# Patient Record
Sex: MALE | Race: WHITE | Employment: UNEMPLOYED | URBAN - METROPOLITAN AREA
[De-identification: names, ages, dates, MRNs, and addresses within clinical notes are randomized per-mention and may not be internally consistent; named-entity substitution may affect disease eponyms.]

---

## 2018-11-15 ASSESSMENT — ENCOUNTER SYMPTOMS
BLOOD IN STOOL: 0
COUGH: 0
CONSTIPATION: 0
RHINORRHEA: 0
ABDOMINAL PAIN: 0
DIARRHEA: 0
NAUSEA: 0
VOMITING: 0
SHORTNESS OF BREATH: 0
COLOR CHANGE: 0
CHEST TIGHTNESS: 0
TROUBLE SWALLOWING: 0
WHEEZING: 0
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
## 2018-11-16 ENCOUNTER — OFFICE VISIT (OUTPATIENT)
Dept: FAMILY MEDICINE CLINIC | Age: 28
End: 2018-11-16
Payer: COMMERCIAL

## 2018-11-16 VITALS
DIASTOLIC BLOOD PRESSURE: 68 MMHG | OXYGEN SATURATION: 98 % | SYSTOLIC BLOOD PRESSURE: 106 MMHG | WEIGHT: 174.2 LBS | HEIGHT: 72 IN | HEART RATE: 64 BPM | BODY MASS INDEX: 23.6 KG/M2

## 2018-11-16 DIAGNOSIS — Z86.19 HISTORY OF HEPATITIS C: ICD-10-CM

## 2018-11-16 DIAGNOSIS — F11.11 HISTORY OF HEROIN ABUSE (HCC): ICD-10-CM

## 2018-11-16 DIAGNOSIS — M54.41 CHRONIC RIGHT-SIDED LOW BACK PAIN WITH RIGHT-SIDED SCIATICA: ICD-10-CM

## 2018-11-16 DIAGNOSIS — R04.2 HEMOPTYSIS: ICD-10-CM

## 2018-11-16 DIAGNOSIS — Z76.89 ENCOUNTER TO ESTABLISH CARE: Primary | ICD-10-CM

## 2018-11-16 DIAGNOSIS — E53.8 COBALAMIN DEFICIENCY: ICD-10-CM

## 2018-11-16 DIAGNOSIS — F51.01 PRIMARY INSOMNIA: ICD-10-CM

## 2018-11-16 DIAGNOSIS — G89.29 CHRONIC RIGHT-SIDED LOW BACK PAIN WITH RIGHT-SIDED SCIATICA: ICD-10-CM

## 2018-11-16 DIAGNOSIS — F41.1 GENERALIZED ANXIETY DISORDER: ICD-10-CM

## 2018-11-16 DIAGNOSIS — Z00.00 HEALTHCARE MAINTENANCE: ICD-10-CM

## 2018-11-16 DIAGNOSIS — E55.9 VITAMIN D DEFICIENCY: ICD-10-CM

## 2018-11-16 DIAGNOSIS — L40.9 PSORIASIS: ICD-10-CM

## 2018-11-16 DIAGNOSIS — F33.2 SEVERE EPISODE OF RECURRENT MAJOR DEPRESSIVE DISORDER, WITHOUT PSYCHOTIC FEATURES (HCC): ICD-10-CM

## 2018-11-16 PROBLEM — D48.5 NEOPLASM OF UNCERTAIN BEHAVIOR OF SKIN: Status: RESOLVED | Noted: 2018-11-16 | Resolved: 2018-11-16

## 2018-11-16 PROBLEM — F32.A DEPRESSIVE DISORDER: Status: ACTIVE | Noted: 2018-11-16

## 2018-11-16 PROBLEM — D48.5 NEOPLASM OF UNCERTAIN BEHAVIOR OF SKIN: Status: ACTIVE | Noted: 2018-11-16

## 2018-11-16 PROBLEM — F32.A DEPRESSIVE DISORDER: Status: RESOLVED | Noted: 2018-11-16 | Resolved: 2018-11-16

## 2018-11-16 PROBLEM — F19.90 POLY-DRUG MISUSER: Status: ACTIVE | Noted: 2018-11-16

## 2018-11-16 PROBLEM — F19.11 HISTORY OF POLYDRUG ABUSE (HCC): Status: ACTIVE | Noted: 2018-11-16

## 2018-11-16 PROBLEM — G47.00 INSOMNIA: Status: ACTIVE | Noted: 2018-11-16

## 2018-11-16 LAB
A/G RATIO: 1.8 (ref 1.1–2.2)
ALBUMIN SERPL-MCNC: 4.8 G/DL (ref 3.4–5)
ALP BLD-CCNC: 40 U/L (ref 40–129)
ALT SERPL-CCNC: 21 U/L (ref 10–40)
ANION GAP SERPL CALCULATED.3IONS-SCNC: 14 MMOL/L (ref 3–16)
AST SERPL-CCNC: 21 U/L (ref 15–37)
BILIRUB SERPL-MCNC: 1.2 MG/DL (ref 0–1)
BUN BLDV-MCNC: 21 MG/DL (ref 7–20)
CALCIUM SERPL-MCNC: 9.9 MG/DL (ref 8.3–10.6)
CHLORIDE BLD-SCNC: 106 MMOL/L (ref 99–110)
CO2: 23 MMOL/L (ref 21–32)
CREAT SERPL-MCNC: 0.8 MG/DL (ref 0.9–1.3)
FOLATE: 6.81 NG/ML (ref 4.78–24.2)
GFR AFRICAN AMERICAN: >60
GFR NON-AFRICAN AMERICAN: >60
GLOBULIN: 2.6 G/DL
GLUCOSE BLD-MCNC: 92 MG/DL (ref 70–99)
POTASSIUM SERPL-SCNC: 4.4 MMOL/L (ref 3.5–5.1)
SODIUM BLD-SCNC: 143 MMOL/L (ref 136–145)
TOTAL PROTEIN: 7.4 G/DL (ref 6.4–8.2)
TSH REFLEX: 0.7 UIU/ML (ref 0.27–4.2)
VITAMIN B-12: 876 PG/ML (ref 211–911)
VITAMIN D 25-HYDROXY: 31 NG/ML

## 2018-11-16 PROCEDURE — G8427 DOCREV CUR MEDS BY ELIG CLIN: HCPCS | Performed by: FAMILY MEDICINE

## 2018-11-16 PROCEDURE — 99385 PREV VISIT NEW AGE 18-39: CPT | Performed by: FAMILY MEDICINE

## 2018-11-16 PROCEDURE — G8420 CALC BMI NORM PARAMETERS: HCPCS | Performed by: FAMILY MEDICINE

## 2018-11-16 PROCEDURE — 99203 OFFICE O/P NEW LOW 30 MIN: CPT | Performed by: FAMILY MEDICINE

## 2018-11-16 PROCEDURE — 1036F TOBACCO NON-USER: CPT | Performed by: FAMILY MEDICINE

## 2018-11-16 PROCEDURE — G8484 FLU IMMUNIZE NO ADMIN: HCPCS | Performed by: FAMILY MEDICINE

## 2018-11-16 RX ORDER — COVID-19 ANTIGEN TEST
220 KIT MISCELLANEOUS
COMMUNITY
End: 2019-02-06 | Stop reason: SDUPTHER

## 2018-11-16 RX ORDER — CLOBETASOL PROPIONATE 0.5 MG/ML
1 LOTION TOPICAL 2 TIMES DAILY
Qty: 118 ML | Refills: 2 | Status: SHIPPED | OUTPATIENT
Start: 2018-11-16 | End: 2019-09-05 | Stop reason: SDUPTHER

## 2018-11-16 RX ORDER — BUPROPION HYDROCHLORIDE 150 MG/1
150 TABLET ORAL EVERY MORNING
Qty: 14 TABLET | Refills: 0 | Status: SHIPPED | OUTPATIENT
Start: 2018-11-16 | End: 2019-02-06

## 2018-11-16 RX ORDER — BUPROPION HYDROCHLORIDE 300 MG/1
300 TABLET ORAL EVERY MORNING
Qty: 21 TABLET | Refills: 0 | Status: SHIPPED | OUTPATIENT
Start: 2018-11-16 | End: 2019-02-06

## 2018-11-16 ASSESSMENT — PATIENT HEALTH QUESTIONNAIRE - PHQ9
6. FEELING BAD ABOUT YOURSELF - OR THAT YOU ARE A FAILURE OR HAVE LET YOURSELF OR YOUR FAMILY DOWN: 2
7. TROUBLE CONCENTRATING ON THINGS, SUCH AS READING THE NEWSPAPER OR WATCHING TELEVISION: 2
3. TROUBLE FALLING OR STAYING ASLEEP: 3
4. FEELING TIRED OR HAVING LITTLE ENERGY: 3
2. FEELING DOWN, DEPRESSED OR HOPELESS: 3
9. THOUGHTS THAT YOU WOULD BE BETTER OFF DEAD, OR OF HURTING YOURSELF: 2
5. POOR APPETITE OR OVEREATING: 3
10. IF YOU CHECKED OFF ANY PROBLEMS, HOW DIFFICULT HAVE THESE PROBLEMS MADE IT FOR YOU TO DO YOUR WORK, TAKE CARE OF THINGS AT HOME, OR GET ALONG WITH OTHER PEOPLE: 3
SUM OF ALL RESPONSES TO PHQ QUESTIONS 1-9: 21
SUM OF ALL RESPONSES TO PHQ QUESTIONS 1-9: 21
8. MOVING OR SPEAKING SO SLOWLY THAT OTHER PEOPLE COULD HAVE NOTICED. OR THE OPPOSITE, BEING SO FIGETY OR RESTLESS THAT YOU HAVE BEEN MOVING AROUND A LOT MORE THAN USUAL: 0
SUM OF ALL RESPONSES TO PHQ9 QUESTIONS 1 & 2: 6
1. LITTLE INTEREST OR PLEASURE IN DOING THINGS: 3

## 2018-11-16 ASSESSMENT — ANXIETY QUESTIONNAIRES
GAD7 TOTAL SCORE: 21
4. TROUBLE RELAXING: 3-NEARLY EVERY DAY
5. BEING SO RESTLESS THAT IT IS HARD TO SIT STILL: 3-NEARLY EVERY DAY
3. WORRYING TOO MUCH ABOUT DIFFERENT THINGS: 3-NEARLY EVERY DAY
7. FEELING AFRAID AS IF SOMETHING AWFUL MIGHT HAPPEN: 3-NEARLY EVERY DAY
1. FEELING NERVOUS, ANXIOUS, OR ON EDGE: 3-NEARLY EVERY DAY
2. NOT BEING ABLE TO STOP OR CONTROL WORRYING: 3-NEARLY EVERY DAY
6. BECOMING EASILY ANNOYED OR IRRITABLE: 3-NEARLY EVERY DAY

## 2018-11-16 ASSESSMENT — ENCOUNTER SYMPTOMS: BACK PAIN: 1

## 2018-11-16 NOTE — PATIENT INSTRUCTIONS
Eat 5 - 6 servings of fruit per day. Locally grown fresh fruit has the most antioxidants. If they are not available, frozen fruit is the next best.     Eat more fresh vegetables, olive oil, and a handful of nuts (unsalted) every day. Make all your grains (breads, pasta, rice) 'whole grain' only to increase natural fiber in your diet     Fish has healthy omega-3 oils. Eating fish twice a week has been shown to improve health. If you take fish oil, take at least 1,000mg EPA + DHA a day (you must look at the food label on the back of the bottle and add up these omega-3s to know how much of this beneficial nutrient is contained in the product). There is more evidence that eating fish improves health more than taking fish oi supplements. Eating eggs has benefit if you eat the high omega-3 eggs. In these eggs, the yolks are good for you. At [employer], go to the [employer] food section and get the 660mg omega-3 eggs. These are really good for you. The chickens are fed fish, and the benefits of the fish are imparted into the egg yolk. If you have diabetes, you should probably avoid eggs. Current research shows that a pesco-vegetarian diet (eating a plant based diet with fish) is the best for improving longevity and reducing cardiovascular disease. Limit saturated fats, sugar and red meat. Avoid trans-fats and processed meat (e.g. Salami). Avoid fried foods, potato dishes and white (or enriched, processed) flour. Foods to avoid! Trans-fats - increases risk of heart disease, stroke, and development of diabetes     Processed meats (lunch meat, goetta, sausage, pepperoni, etc.. ) - increases risk of cancers     High fructose corn syrup (fructose, corn syrup) - increases risk of high blood pressure, obesity and diabetes     More information on healthful diets and recipes is available at www. choosemyplate.gov, or ww.mypyramid.gov     Enhance your foods with spices.  They are full of nutritional benefit and

## 2018-11-16 NOTE — LETTER
[address] [id number]  Phone: [phone]  Fax: [phone]    Delon Hastings MD        December 3, 2018    [address] [facility]      Dear Margie Orlando:    The office has made attempts to call you regarding lab results. Please contact the office for your information. If you have already received your results via [employer] or speaking with office staff please disregard. If you have any questions or concerns, please don't hesitate to call.     Sincerely,        Delon Hastings MD

## 2018-11-17 LAB
HIV AG/AB: NORMAL
HIV ANTIGEN: NORMAL
HIV-1 ANTIBODY: NORMAL
HIV-2 AB: NORMAL
REASON FOR REJECTION: NORMAL
REJECTED TEST: NORMAL

## 2018-11-19 ENCOUNTER — TELEPHONE (OUTPATIENT)
Dept: FAMILY MEDICINE CLINIC | Age: 28
End: 2018-11-19

## 2018-11-19 NOTE — TELEPHONE ENCOUNTER
We are aware and this was discussed with the patient at the time of his visit. We will repeat this lab at the next visit, which he is agreeable to, considering he had a syncopal episode during this lab draw. Thank you for the notification.

## 2018-11-20 LAB
HCV QNT BY NAAT IU/ML: NOT DETECTED IU/ML
HCV QNT BY NAAT LOG IU/ML: NOT DETECTED LOG IU/ML
INTERPRETATION: NOT DETECTED

## 2018-12-12 RX ORDER — VALACYCLOVIR HYDROCHLORIDE 1 G/1
2000 TABLET, FILM COATED ORAL DAILY
Qty: 30 TABLET | Refills: 0 | Status: SHIPPED | OUTPATIENT
Start: 2018-12-12 | End: 2019-04-11 | Stop reason: SDUPTHER

## 2018-12-14 ENCOUNTER — HOSPITAL ENCOUNTER (OUTPATIENT)
Dept: MRI IMAGING | Age: 28
Discharge: HOME | End: 2018-12-14
Payer: COMMERCIAL

## 2018-12-14 ENCOUNTER — OFFICE VISIT (OUTPATIENT)
Dept: FAMILY MEDICINE CLINIC | Age: 28
End: 2018-12-14
Payer: COMMERCIAL

## 2018-12-14 VITALS
OXYGEN SATURATION: 97 % | BODY MASS INDEX: 23.6 KG/M2 | HEART RATE: 68 BPM | SYSTOLIC BLOOD PRESSURE: 110 MMHG | WEIGHT: 174 LBS | DIASTOLIC BLOOD PRESSURE: 80 MMHG

## 2018-12-14 DIAGNOSIS — F33.2 SEVERE EPISODE OF RECURRENT MAJOR DEPRESSIVE DISORDER, WITHOUT PSYCHOTIC FEATURES (HCC): ICD-10-CM

## 2018-12-14 DIAGNOSIS — R44.9 SENSORY DEFICIT, RIGHT: ICD-10-CM

## 2018-12-14 DIAGNOSIS — M21.371 FOOT DROP, RIGHT: Primary | ICD-10-CM

## 2018-12-14 DIAGNOSIS — M54.41 ACUTE RIGHT-SIDED LOW BACK PAIN WITH RIGHT-SIDED SCIATICA: ICD-10-CM

## 2018-12-14 DIAGNOSIS — M21.371 FOOT DROP, RIGHT: ICD-10-CM

## 2018-12-14 PROCEDURE — 99214 OFFICE O/P EST MOD 30 MIN: CPT | Performed by: FAMILY MEDICINE

## 2018-12-14 PROCEDURE — 1036F TOBACCO NON-USER: CPT | Performed by: FAMILY MEDICINE

## 2018-12-14 PROCEDURE — G8427 DOCREV CUR MEDS BY ELIG CLIN: HCPCS | Performed by: FAMILY MEDICINE

## 2018-12-14 PROCEDURE — G8484 FLU IMMUNIZE NO ADMIN: HCPCS | Performed by: FAMILY MEDICINE

## 2018-12-14 PROCEDURE — 72148 MRI LUMBAR SPINE W/O DYE: CPT

## 2018-12-14 PROCEDURE — G8420 CALC BMI NORM PARAMETERS: HCPCS | Performed by: FAMILY MEDICINE

## 2018-12-14 RX ORDER — PREDNISONE 50 MG/1
50 TABLET ORAL DAILY
Qty: 5 TABLET | Refills: 0 | Status: SHIPPED | OUTPATIENT
Start: 2018-12-14 | End: 2018-12-19

## 2018-12-14 ASSESSMENT — ENCOUNTER SYMPTOMS
SHORTNESS OF BREATH: 0
ABDOMINAL PAIN: 0
BACK PAIN: 1
NAUSEA: 0
VOMITING: 0
COLOR CHANGE: 0

## 2018-12-16 PROBLEM — Z00.00 HEALTHCARE MAINTENANCE: Status: RESOLVED | Noted: 2018-11-16 | Resolved: 2018-12-16

## 2018-12-17 ENCOUNTER — TELEPHONE (OUTPATIENT)
Dept: FAMILY MEDICINE CLINIC | Age: 28
End: 2018-12-17

## 2018-12-17 DIAGNOSIS — M54.42 ACUTE BILATERAL LOW BACK PAIN WITH BILATERAL SCIATICA: Primary | ICD-10-CM

## 2018-12-17 DIAGNOSIS — M54.41 ACUTE BILATERAL LOW BACK PAIN WITH BILATERAL SCIATICA: Primary | ICD-10-CM

## 2018-12-18 ENCOUNTER — HOSPITAL ENCOUNTER (EMERGENCY)
Age: 28
Discharge: HOME | End: 2018-12-18
Payer: COMMERCIAL

## 2018-12-18 VITALS
BODY MASS INDEX: 23.57 KG/M2 | DIASTOLIC BLOOD PRESSURE: 87 MMHG | TEMPERATURE: 98.2 F | HEART RATE: 62 BPM | WEIGHT: 174 LBS | RESPIRATION RATE: 18 BRPM | HEIGHT: 72 IN | SYSTOLIC BLOOD PRESSURE: 139 MMHG | OXYGEN SATURATION: 100 %

## 2018-12-18 DIAGNOSIS — M62.838 SPASM OF MUSCLE: ICD-10-CM

## 2018-12-18 DIAGNOSIS — G89.29 ACUTE EXACERBATION OF CHRONIC LOW BACK PAIN: Primary | ICD-10-CM

## 2018-12-18 DIAGNOSIS — M54.50 ACUTE EXACERBATION OF CHRONIC LOW BACK PAIN: Primary | ICD-10-CM

## 2018-12-18 PROCEDURE — 96372 THER/PROPH/DIAG INJ SC/IM: CPT

## 2018-12-18 PROCEDURE — 99283 EMERGENCY DEPT VISIT LOW MDM: CPT

## 2018-12-18 PROCEDURE — 96374 THER/PROPH/DIAG INJ IV PUSH: CPT

## 2018-12-18 PROCEDURE — 6360000002 HC RX W HCPCS: Performed by: NURSE PRACTITIONER

## 2018-12-18 RX ORDER — ORPHENADRINE CITRATE 30 MG/ML
60 INJECTION INTRAMUSCULAR; INTRAVENOUS ONCE
Status: COMPLETED | OUTPATIENT
Start: 2018-12-18 | End: 2018-12-18

## 2018-12-18 RX ORDER — NAPROXEN 500 MG/1
500 TABLET ORAL 2 TIMES DAILY WITH MEALS
Qty: 30 TABLET | Refills: 0 | Status: SHIPPED | OUTPATIENT
Start: 2018-12-18 | End: 2019-04-02 | Stop reason: ALTCHOICE

## 2018-12-18 RX ORDER — DEXAMETHASONE SODIUM PHOSPHATE 4 MG/ML
10 INJECTION, SOLUTION INTRA-ARTICULAR; INTRALESIONAL; INTRAMUSCULAR; INTRAVENOUS; SOFT TISSUE ONCE
Status: COMPLETED | OUTPATIENT
Start: 2018-12-18 | End: 2018-12-18

## 2018-12-18 RX ORDER — METHOCARBAMOL 500 MG/1
500 TABLET, FILM COATED ORAL 3 TIMES DAILY
Qty: 21 TABLET | Refills: 0 | Status: SHIPPED | OUTPATIENT
Start: 2018-12-18 | End: 2018-12-25

## 2018-12-18 RX ADMIN — DEXAMETHASONE SODIUM PHOSPHATE 10 MG: 4 INJECTION, SOLUTION INTRAMUSCULAR; INTRAVENOUS at 03:02

## 2018-12-18 RX ADMIN — ORPHENADRINE CITRATE 60 MG: 60 INJECTION INTRAMUSCULAR; INTRAVENOUS at 03:03

## 2018-12-18 ASSESSMENT — PAIN DESCRIPTION - FREQUENCY: FREQUENCY: CONTINUOUS

## 2018-12-18 ASSESSMENT — PAIN DESCRIPTION - PAIN TYPE: TYPE: CHRONIC PAIN

## 2018-12-18 ASSESSMENT — PAIN DESCRIPTION - ONSET: ONSET: ON-GOING

## 2018-12-18 ASSESSMENT — PAIN DESCRIPTION - ORIENTATION: ORIENTATION: LOWER

## 2018-12-18 ASSESSMENT — ENCOUNTER SYMPTOMS
SHORTNESS OF BREATH: 0
VOMITING: 0
WHEEZING: 0
COLOR CHANGE: 0
NAUSEA: 0
BACK PAIN: 1
COUGH: 0
ABDOMINAL PAIN: 0
DIARRHEA: 0

## 2018-12-18 ASSESSMENT — PAIN DESCRIPTION - PROGRESSION: CLINICAL_PROGRESSION: NOT CHANGED

## 2018-12-18 ASSESSMENT — PAIN SCALES - GENERAL: PAINLEVEL_OUTOF10: 7

## 2018-12-18 ASSESSMENT — PAIN DESCRIPTION - DESCRIPTORS: DESCRIPTORS: ACHING

## 2018-12-18 ASSESSMENT — PAIN DESCRIPTION - LOCATION: LOCATION: BACK

## 2018-12-18 NOTE — ED PROVIDER NOTES
exhibits no discharge. Left eye exhibits no discharge. No scleral icterus. Neck: Normal range of motion. Neck supple. Cardiovascular: Normal rate and normal heart sounds. Pulmonary/Chest: Effort normal. No respiratory distress. Abdominal: Soft. Musculoskeletal: Normal range of motion. Neurological: He is alert and oriented to person, place, and time. GCS eye subscore is 4. GCS verbal subscore is 5. GCS motor subscore is 6. Patient has no central cervical, thoracic or lumbar spine tenderness or step-off, reproducible tenderness to the left paraspinal muscles however there is no rashes lesions or deformity. He is no numbness tingling or paresthesias, he is denying saddle anesthesia. Equal strength and power at 5\5 of his Arms and legs are resting in stretcher however he does have a positive right leg raises this illicits pain. Skin: Skin is warm. He is not diaphoretic. No pallor. Psychiatric: He has a normal mood and affect. His behavior is normal.   Nursing note and vitals reviewed. MEDICAL DECISION MAKING    Vitals:    Vitals:    12/18/18 0223   BP: (!) 147/87   Pulse: 65   Resp: 15   Temp: 98.2 °F (36.8 °C)   TempSrc: Oral   SpO2: 99%   Weight: 174 lb (78.9 kg)   Height: 6' (1.829 m)       LABS:Labs Reviewed - No data to display     Remainder of labs reviewed and werenegative at this time or not returned at the time of this note.     RADIOLOGY:   Non-plain film images such as CT, Ultrasound and MRI are read by the radiologist. Abida NAVARRO, NORM - CNP have directly visualized the radiologic plain film image(s) with the below findings:        Interpretation per the Radiologist below, if available at the time of thisnote:    No orders to display        Mri Lumbar Spine Wo Contrast    Result Date: 12/14/2018  EXAMINATION: MRI OF [address], 12/14/2018 12:31 pm TECHNIQUE: Multiplanar multisequence MRI of the lumbar spine was performed without the administration of

## 2018-12-18 NOTE — LETTER
[facility]  ED  [address] [address]  Phone: [phone]  Fax: [phone]               December 18, 2018    Patient: Joselyn Lee   YOB: 1990   Date of Visit: 12/18/2018       To Whom It May Concern:    Wilburt Cancer was seen and treated in our emergency department on 12/18/2018. He may return to work on 12/19/18.       Sincerely,             Signature:__________________________________

## 2018-12-19 NOTE — TELEPHONE ENCOUNTER
Referral in the system now. Referred to [facility]. Please call pt to inform him of the number to call to make an appmnt. A letter for the visit on 12/17 is fine. Please print and have pt .

## 2019-02-06 ENCOUNTER — OFFICE VISIT (OUTPATIENT)
Dept: FAMILY MEDICINE CLINIC | Age: 29
End: 2019-02-06
Payer: COMMERCIAL

## 2019-02-06 VITALS
SYSTOLIC BLOOD PRESSURE: 120 MMHG | HEART RATE: 66 BPM | OXYGEN SATURATION: 98 % | WEIGHT: 182 LBS | BODY MASS INDEX: 24.68 KG/M2 | TEMPERATURE: 98.4 F | DIASTOLIC BLOOD PRESSURE: 84 MMHG

## 2019-02-06 DIAGNOSIS — R11.2 INTRACTABLE VOMITING WITH NAUSEA, UNSPECIFIED VOMITING TYPE: ICD-10-CM

## 2019-02-06 DIAGNOSIS — H65.112 ACUTE MUCOID OTITIS MEDIA OF LEFT EAR: Primary | ICD-10-CM

## 2019-02-06 LAB — S PYO AG THROAT QL: NORMAL

## 2019-02-06 PROCEDURE — G8427 DOCREV CUR MEDS BY ELIG CLIN: HCPCS | Performed by: FAMILY MEDICINE

## 2019-02-06 PROCEDURE — G8420 CALC BMI NORM PARAMETERS: HCPCS | Performed by: FAMILY MEDICINE

## 2019-02-06 PROCEDURE — G8484 FLU IMMUNIZE NO ADMIN: HCPCS | Performed by: FAMILY MEDICINE

## 2019-02-06 PROCEDURE — 87880 STREP A ASSAY W/OPTIC: CPT | Performed by: FAMILY MEDICINE

## 2019-02-06 PROCEDURE — 99214 OFFICE O/P EST MOD 30 MIN: CPT | Performed by: FAMILY MEDICINE

## 2019-02-06 PROCEDURE — 1036F TOBACCO NON-USER: CPT | Performed by: FAMILY MEDICINE

## 2019-02-06 RX ORDER — AMOXICILLIN AND CLAVULANATE POTASSIUM 875; 125 MG/1; MG/1
1 TABLET, FILM COATED ORAL 2 TIMES DAILY
Qty: 20 TABLET | Refills: 0 | Status: SHIPPED | OUTPATIENT
Start: 2019-02-06 | End: 2019-02-16

## 2019-02-06 RX ORDER — ONDANSETRON 4 MG/1
4 TABLET, ORALLY DISINTEGRATING ORAL EVERY 8 HOURS PRN
Qty: 30 TABLET | Refills: 1 | Status: SHIPPED | OUTPATIENT
Start: 2019-02-06 | End: 2019-05-09 | Stop reason: ALTCHOICE

## 2019-02-06 ASSESSMENT — ENCOUNTER SYMPTOMS
CONSTIPATION: 0
RHINORRHEA: 0
COUGH: 0
TROUBLE SWALLOWING: 0
SHORTNESS OF BREATH: 0
SINUS PRESSURE: 0
COLOR CHANGE: 0
NAUSEA: 1
SORE THROAT: 0
VOMITING: 1
ABDOMINAL PAIN: 0
SINUS PAIN: 0
BACK PAIN: 1
DIARRHEA: 0
BLOOD IN STOOL: 0

## 2019-02-08 LAB — THROAT CULTURE: NORMAL

## 2019-04-01 ENCOUNTER — HOSPITAL ENCOUNTER (EMERGENCY)
Age: 29
Discharge: HOME | End: 2019-04-02
Attending: EMERGENCY MEDICINE
Payer: COMMERCIAL

## 2019-04-01 DIAGNOSIS — R10.84 GENERALIZED ABDOMINAL PAIN: ICD-10-CM

## 2019-04-01 DIAGNOSIS — K92.1 HEMATOCHEZIA: Primary | ICD-10-CM

## 2019-04-01 DIAGNOSIS — K21.9 GASTROESOPHAGEAL REFLUX DISEASE, ESOPHAGITIS PRESENCE NOT SPECIFIED: ICD-10-CM

## 2019-04-01 LAB
A/G RATIO: 1.5 (ref 1.1–2.2)
ALBUMIN SERPL-MCNC: 4.2 G/DL (ref 3.4–5)
ALP BLD-CCNC: 37 U/L (ref 40–129)
ALT SERPL-CCNC: 13 U/L (ref 10–40)
ANION GAP SERPL CALCULATED.3IONS-SCNC: 10 MMOL/L (ref 3–16)
AST SERPL-CCNC: 16 U/L (ref 15–37)
BASOPHILS ABSOLUTE: 0.1 K/UL (ref 0–0.2)
BASOPHILS RELATIVE PERCENT: 0.7 %
BILIRUB SERPL-MCNC: 0.4 MG/DL (ref 0–1)
BUN BLDV-MCNC: 22 MG/DL (ref 7–20)
CALCIUM SERPL-MCNC: 8.8 MG/DL (ref 8.3–10.6)
CHLORIDE BLD-SCNC: 101 MMOL/L (ref 99–110)
CO2: 28 MMOL/L (ref 21–32)
CREAT SERPL-MCNC: 0.8 MG/DL (ref 0.9–1.3)
EOSINOPHILS ABSOLUTE: 0.1 K/UL (ref 0–0.6)
EOSINOPHILS RELATIVE PERCENT: 1.7 %
GFR AFRICAN AMERICAN: >60
GFR NON-AFRICAN AMERICAN: >60
GLOBULIN: 2.8 G/DL
GLUCOSE BLD-MCNC: 87 MG/DL (ref 70–99)
HCT VFR BLD CALC: 45.1 % (ref 40.5–52.5)
HEMOGLOBIN: 15.6 G/DL (ref 13.5–17.5)
LIPASE: 45 U/L (ref 13–60)
LYMPHOCYTES ABSOLUTE: 1.4 K/UL (ref 1–5.1)
LYMPHOCYTES RELATIVE PERCENT: 18.4 %
MCH RBC QN AUTO: 30.7 PG (ref 26–34)
MCHC RBC AUTO-ENTMCNC: 34.5 G/DL (ref 31–36)
MCV RBC AUTO: 89 FL (ref 80–100)
MONOCYTES ABSOLUTE: 0.6 K/UL (ref 0–1.3)
MONOCYTES RELATIVE PERCENT: 7.6 %
NEUTROPHILS ABSOLUTE: 5.5 K/UL (ref 1.7–7.7)
NEUTROPHILS RELATIVE PERCENT: 71.6 %
PDW BLD-RTO: 13.4 % (ref 12.4–15.4)
PLATELET # BLD: 178 K/UL (ref 135–450)
PMV BLD AUTO: 9 FL (ref 5–10.5)
POTASSIUM SERPL-SCNC: 4.2 MMOL/L (ref 3.5–5.1)
RBC # BLD: 5.07 M/UL (ref 4.2–5.9)
SODIUM BLD-SCNC: 139 MMOL/L (ref 136–145)
TOTAL PROTEIN: 7 G/DL (ref 6.4–8.2)
WBC # BLD: 7.7 K/UL (ref 4–11)

## 2019-04-01 PROCEDURE — 85025 COMPLETE CBC W/AUTO DIFF WBC: CPT

## 2019-04-01 PROCEDURE — 80053 COMPREHEN METABOLIC PANEL: CPT

## 2019-04-01 PROCEDURE — 99283 EMERGENCY DEPT VISIT LOW MDM: CPT

## 2019-04-01 PROCEDURE — 83690 ASSAY OF LIPASE: CPT

## 2019-04-01 RX ORDER — PROMETHAZINE HYDROCHLORIDE 12.5 MG/1
12.5 TABLET ORAL EVERY 6 HOURS PRN
COMMUNITY
End: 2019-04-02 | Stop reason: ALTCHOICE

## 2019-04-01 ASSESSMENT — PAIN DESCRIPTION - FREQUENCY: FREQUENCY: INTERMITTENT

## 2019-04-01 ASSESSMENT — PAIN DESCRIPTION - ONSET: ONSET: ON-GOING

## 2019-04-01 ASSESSMENT — PAIN DESCRIPTION - LOCATION: LOCATION: ABDOMEN

## 2019-04-01 ASSESSMENT — PAIN SCALES - GENERAL: PAINLEVEL_OUTOF10: 7

## 2019-04-01 ASSESSMENT — PAIN DESCRIPTION - DESCRIPTORS: DESCRIPTORS: BURNING;SHARP

## 2019-04-01 ASSESSMENT — PAIN DESCRIPTION - ORIENTATION: ORIENTATION: RIGHT;MID

## 2019-04-01 ASSESSMENT — PAIN DESCRIPTION - PAIN TYPE: TYPE: ACUTE PAIN;VISCERAL PAIN

## 2019-04-02 ENCOUNTER — APPOINTMENT (OUTPATIENT)
Dept: CT IMAGING | Age: 29
End: 2019-04-02
Payer: COMMERCIAL

## 2019-04-02 VITALS
BODY MASS INDEX: 25.06 KG/M2 | TEMPERATURE: 99.1 F | SYSTOLIC BLOOD PRESSURE: 111 MMHG | HEART RATE: 85 BPM | DIASTOLIC BLOOD PRESSURE: 75 MMHG | HEIGHT: 72 IN | WEIGHT: 185 LBS | RESPIRATION RATE: 16 BRPM | OXYGEN SATURATION: 99 %

## 2019-04-02 VITALS
OXYGEN SATURATION: 100 % | WEIGHT: 185 LBS | RESPIRATION RATE: 16 BRPM | TEMPERATURE: 98.8 F | HEART RATE: 62 BPM | SYSTOLIC BLOOD PRESSURE: 127 MMHG | BODY MASS INDEX: 25.06 KG/M2 | HEIGHT: 72 IN | DIASTOLIC BLOOD PRESSURE: 80 MMHG

## 2019-04-02 DIAGNOSIS — R11.2 NAUSEA AND VOMITING, INTRACTABILITY OF VOMITING NOT SPECIFIED, UNSPECIFIED VOMITING TYPE: Primary | ICD-10-CM

## 2019-04-02 DIAGNOSIS — K62.5 RECTAL BLEEDING: ICD-10-CM

## 2019-04-02 DIAGNOSIS — R10.13 ABDOMINAL PAIN, EPIGASTRIC: ICD-10-CM

## 2019-04-02 LAB
A/G RATIO: 1.5 (ref 1.1–2.2)
ALBUMIN SERPL-MCNC: 4.1 G/DL (ref 3.4–5)
ALP BLD-CCNC: 40 U/L (ref 40–129)
ALT SERPL-CCNC: 14 U/L (ref 10–40)
AMPHETAMINE SCREEN, URINE: ABNORMAL
ANION GAP SERPL CALCULATED.3IONS-SCNC: 12 MMOL/L (ref 3–16)
AST SERPL-CCNC: 16 U/L (ref 15–37)
BARBITURATE SCREEN URINE: ABNORMAL
BASOPHILS ABSOLUTE: 0 K/UL (ref 0–0.2)
BASOPHILS RELATIVE PERCENT: 0.3 %
BENZODIAZEPINE SCREEN, URINE: ABNORMAL
BILIRUB SERPL-MCNC: 0.8 MG/DL (ref 0–1)
BILIRUBIN URINE: NEGATIVE
BILIRUBIN URINE: NEGATIVE
BLOOD, URINE: NEGATIVE
BLOOD, URINE: NEGATIVE
BUN BLDV-MCNC: 20 MG/DL (ref 7–20)
CALCIUM SERPL-MCNC: 8.6 MG/DL (ref 8.3–10.6)
CANNABINOID SCREEN URINE: POSITIVE
CHLORIDE BLD-SCNC: 105 MMOL/L (ref 99–110)
CLARITY: CLEAR
CLARITY: CLEAR
CO2: 24 MMOL/L (ref 21–32)
COCAINE METABOLITE SCREEN URINE: ABNORMAL
COLOR: YELLOW
COLOR: YELLOW
CREAT SERPL-MCNC: 0.9 MG/DL (ref 0.9–1.3)
EOSINOPHILS ABSOLUTE: 0.1 K/UL (ref 0–0.6)
EOSINOPHILS RELATIVE PERCENT: 2 %
GFR AFRICAN AMERICAN: >60
GFR NON-AFRICAN AMERICAN: >60
GLOBULIN: 2.8 G/DL
GLUCOSE BLD-MCNC: 98 MG/DL (ref 70–99)
GLUCOSE URINE: NEGATIVE MG/DL
GLUCOSE URINE: NEGATIVE MG/DL
HCT VFR BLD CALC: 44.8 % (ref 40.5–52.5)
HEMOGLOBIN: 15.7 G/DL (ref 13.5–17.5)
INR BLD: 1.08 (ref 0.86–1.14)
KETONES, URINE: NEGATIVE MG/DL
KETONES, URINE: NEGATIVE MG/DL
LEUKOCYTE ESTERASE, URINE: NEGATIVE
LEUKOCYTE ESTERASE, URINE: NEGATIVE
LYMPHOCYTES ABSOLUTE: 1 K/UL (ref 1–5.1)
LYMPHOCYTES RELATIVE PERCENT: 16.6 %
Lab: ABNORMAL
MCH RBC QN AUTO: 30.9 PG (ref 26–34)
MCHC RBC AUTO-ENTMCNC: 35 G/DL (ref 31–36)
MCV RBC AUTO: 88.2 FL (ref 80–100)
METHADONE SCREEN, URINE: ABNORMAL
MICROSCOPIC EXAMINATION: NORMAL
MICROSCOPIC EXAMINATION: NORMAL
MONOCYTES ABSOLUTE: 0.5 K/UL (ref 0–1.3)
MONOCYTES RELATIVE PERCENT: 7.6 %
NEUTROPHILS ABSOLUTE: 4.4 K/UL (ref 1.7–7.7)
NEUTROPHILS RELATIVE PERCENT: 73.5 %
NITRITE, URINE: NEGATIVE
NITRITE, URINE: NEGATIVE
OPIATE SCREEN URINE: ABNORMAL
OXYCODONE URINE: ABNORMAL
PDW BLD-RTO: 13.5 % (ref 12.4–15.4)
PH UA: 6
PH UA: 6 (ref 5–8)
PH UA: 6.5 (ref 5–8)
PHENCYCLIDINE SCREEN URINE: ABNORMAL
PLATELET # BLD: 158 K/UL (ref 135–450)
PMV BLD AUTO: 9 FL (ref 5–10.5)
POTASSIUM REFLEX MAGNESIUM: 3.9 MMOL/L (ref 3.5–5.1)
PROPOXYPHENE SCREEN: ABNORMAL
PROTEIN UA: NEGATIVE MG/DL
PROTEIN UA: NEGATIVE MG/DL
PROTHROMBIN TIME: 12.3 SEC (ref 9.8–13)
RBC # BLD: 5.08 M/UL (ref 4.2–5.9)
SODIUM BLD-SCNC: 141 MMOL/L (ref 136–145)
SPECIFIC GRAVITY UA: 1.02 (ref 1–1.03)
SPECIFIC GRAVITY UA: 1.02 (ref 1–1.03)
TOTAL PROTEIN: 6.9 G/DL (ref 6.4–8.2)
URINE REFLEX TO CULTURE: NORMAL
URINE REFLEX TO CULTURE: NORMAL
URINE TYPE: NORMAL
URINE TYPE: NORMAL
UROBILINOGEN, URINE: 0.2 E.U./DL
UROBILINOGEN, URINE: 0.2 E.U./DL
WBC # BLD: 5.9 K/UL (ref 4–11)

## 2019-04-02 PROCEDURE — 80053 COMPREHEN METABOLIC PANEL: CPT

## 2019-04-02 PROCEDURE — 74177 CT ABD & PELVIS W/CONTRAST: CPT

## 2019-04-02 PROCEDURE — 6360000004 HC RX CONTRAST MEDICATION: Performed by: EMERGENCY MEDICINE

## 2019-04-02 PROCEDURE — 96375 TX/PRO/DX INJ NEW DRUG ADDON: CPT

## 2019-04-02 PROCEDURE — 85610 PROTHROMBIN TIME: CPT

## 2019-04-02 PROCEDURE — 6360000002 HC RX W HCPCS: Performed by: EMERGENCY MEDICINE

## 2019-04-02 PROCEDURE — 96361 HYDRATE IV INFUSION ADD-ON: CPT

## 2019-04-02 PROCEDURE — 99284 EMERGENCY DEPT VISIT MOD MDM: CPT

## 2019-04-02 PROCEDURE — 85025 COMPLETE CBC W/AUTO DIFF WBC: CPT

## 2019-04-02 PROCEDURE — 81003 URINALYSIS AUTO W/O SCOPE: CPT

## 2019-04-02 PROCEDURE — 2580000003 HC RX 258: Performed by: EMERGENCY MEDICINE

## 2019-04-02 PROCEDURE — 96372 THER/PROPH/DIAG INJ SC/IM: CPT

## 2019-04-02 PROCEDURE — 96374 THER/PROPH/DIAG INJ IV PUSH: CPT

## 2019-04-02 PROCEDURE — 80307 DRUG TEST PRSMV CHEM ANLYZR: CPT

## 2019-04-02 RX ORDER — DICYCLOMINE HYDROCHLORIDE 10 MG/ML
20 INJECTION INTRAMUSCULAR ONCE
Status: COMPLETED | OUTPATIENT
Start: 2019-04-02 | End: 2019-04-02

## 2019-04-02 RX ORDER — PROMETHAZINE HYDROCHLORIDE 25 MG/1
25 TABLET ORAL EVERY 6 HOURS PRN
Qty: 20 TABLET | Refills: 0 | Status: SHIPPED | OUTPATIENT
Start: 2019-04-02 | End: 2019-04-09

## 2019-04-02 RX ORDER — PANTOPRAZOLE SODIUM 40 MG/1
40 TABLET, DELAYED RELEASE ORAL
Qty: 30 TABLET | Refills: 0 | Status: SHIPPED | OUTPATIENT
Start: 2019-04-02 | End: 2019-06-28 | Stop reason: ALTCHOICE

## 2019-04-02 RX ORDER — KETOROLAC TROMETHAMINE 30 MG/ML
15 INJECTION, SOLUTION INTRAMUSCULAR; INTRAVENOUS ONCE
Status: COMPLETED | OUTPATIENT
Start: 2019-04-02 | End: 2019-04-02

## 2019-04-02 RX ORDER — ONDANSETRON 2 MG/ML
4 INJECTION INTRAMUSCULAR; INTRAVENOUS
Status: DISCONTINUED | OUTPATIENT
Start: 2019-04-02 | End: 2019-04-02 | Stop reason: HOSPADM

## 2019-04-02 RX ORDER — DICYCLOMINE HYDROCHLORIDE 10 MG/1
10 CAPSULE ORAL
Qty: 28 CAPSULE | Refills: 0 | Status: SHIPPED | OUTPATIENT
Start: 2019-04-02 | End: 2019-05-09 | Stop reason: ALTCHOICE

## 2019-04-02 RX ORDER — 0.9 % SODIUM CHLORIDE 0.9 %
1000 INTRAVENOUS SOLUTION INTRAVENOUS ONCE
Status: COMPLETED | OUTPATIENT
Start: 2019-04-02 | End: 2019-04-02

## 2019-04-02 RX ORDER — FAMOTIDINE 20 MG/1
40 TABLET, FILM COATED ORAL DAILY
Qty: 60 TABLET | Refills: 0 | Status: SHIPPED | OUTPATIENT
Start: 2019-04-02 | End: 2019-05-09 | Stop reason: ALTCHOICE

## 2019-04-02 RX ORDER — HYDROCORTISONE ACETATE 25 MG/1
25 SUPPOSITORY RECTAL 2 TIMES DAILY
Qty: 30 SUPPOSITORY | Refills: 0 | Status: SHIPPED | OUTPATIENT
Start: 2019-04-02 | End: 2019-04-17

## 2019-04-02 RX ADMIN — KETOROLAC TROMETHAMINE 15 MG: 30 INJECTION, SOLUTION INTRAMUSCULAR; INTRAVENOUS at 14:22

## 2019-04-02 RX ADMIN — DICYCLOMINE HYDROCHLORIDE 20 MG: 20 INJECTION, SOLUTION INTRAMUSCULAR at 15:36

## 2019-04-02 RX ADMIN — SODIUM CHLORIDE 1000 ML: 9 INJECTION, SOLUTION INTRAVENOUS at 15:36

## 2019-04-02 RX ADMIN — IOPAMIDOL 75 ML: 755 INJECTION, SOLUTION INTRAVENOUS at 15:02

## 2019-04-02 RX ADMIN — ONDANSETRON 4 MG: 2 INJECTION INTRAMUSCULAR; INTRAVENOUS at 14:21

## 2019-04-02 ASSESSMENT — PAIN DESCRIPTION - PAIN TYPE: TYPE: ACUTE PAIN

## 2019-04-02 ASSESSMENT — PAIN SCALES - GENERAL
PAINLEVEL_OUTOF10: 7
PAINLEVEL_OUTOF10: 8

## 2019-04-02 ASSESSMENT — PAIN DESCRIPTION - LOCATION: LOCATION: ABDOMEN

## 2019-04-02 NOTE — ED PROVIDER NOTES
abused: Not on file     Physically abused: Not on file     Forced sexual activity: Not on file   Other Topics Concern    Not on file   Social History Narrative    Works as a  at [employer]    Has a 3year old girl    Good relationship with GF who is a        Nursing notes reviewed. ED Triage Vitals [04/02/19 1221]   Enc Vitals Group      /82      Pulse 67      Resp 17      Temp 98.7 °F (37.1 °C)      Temp Source Oral      SpO2 98 %      Weight 185 lb (83.9 kg)      Height 6' (1.829 m)      Head Circumference       Peak Flow       Pain Score       Pain Loc       Pain Edu? Excl. in [address]? GENERAL:  Awake, alert. Well developed, well nourished with no apparent distress. HENT:  Normocephalic, Atraumatic, moist mucous membranes. EYES:  Pupils equal round and reactive to light, Conjunctiva normal, extraocular movements normal.  NECK:  No meningeal signs, Supple. CHEST:  Regular rate and rhythm, chest wall non-tender. LUNGS:  Clear to auscultation bilaterally, no respiratory distress. ABDOMEN:  Soft, moderate midepigastric tenderness, no rebound, rigidity or guarding, non-distended, normal bowel sounds. No costovertebral angle tenderness to palpation. EXTREMITIES:  Normal range of motion, no edema, no tenderness, no deformity, distal pulses present. BACK:  No tenderness. SKIN: Warm, dry and intact. NEUROLOGIC: Normal mental status. Moving all extremities to command. RADIOLOGY  X-RAYS:  I have reviewed radiologic plain film image(s). ALL OTHER NON-PLAIN FILM IMAGES SUCH AS CT, ULTRASOUND AND MRI HAVE BEEN READ BY THE RADIOLOGIST. CT ABDOMEN PELVIS W IV CONTRAST Additional Contrast? None   Preliminary Result   No acute intra-abdominal or intrapelvic abnormality noted.       Appendix is visualized and appears normal.              LABS  Labs Reviewed   URINE DRUG SCREEN - Abnormal; Notable for the following components:       Result Value    Cannabinoid Scrn, Ur POSITIVE vomiting type    2. Abdominal pain, epigastric    3. Rectal bleeding        Blood pressure 127/80, pulse 62, temperature 98.8 °F (37.1 °C), temperature source Oral, resp. rate 16, height 6' (1.829 m), weight 185 lb (83.9 kg), SpO2 100 %. Patient was given scripts for the following medications. I counseled patient how to take these medications. Discharge Medication List as of 4/2/2019  4:25 PM      START taking these medications    Details   pantoprazole (PROTONIX) 40 MG tablet Take 1 tablet by mouth every morning (before breakfast), Disp-30 tablet, R-0Print      promethazine (PHENERGAN) 25 MG tablet Take 1 tablet by mouth every 6 hours as needed for Nausea, Disp-20 tablet, R-0Print      dicyclomine (BENTYL) 10 MG capsule Take 1 capsule by mouth 4 times daily (before meals and nightly) for 7 days, Disp-28 capsule, R-0Print      hydrocortisone (ANUSOL-HC) 25 MG suppository Place 1 suppository rectally 2 times daily for 15 days, Disp-30 suppository, R-0Print             Disposition  Pt is in good condition upon Discharge to home. This chart was generated using the [employer] dictation system. I created this record but it may contain dictation errors.           Berenice Parikh MD  04/02/19 1436

## 2019-04-02 NOTE — ED NOTES
Verbal and written discharge instructions given. IV removed. Prescriptions given to patient. Patient in stable condition, discharged home with significant other.      Sari Meyers RN  04/02/19 2605

## 2019-04-02 NOTE — ED NOTES
History of Present Illness     Patient Identification  Lorraine Marie is a 25 y.o. male. Patient information was obtained from patient. History/Exam limitations: none. Patient presented to the Emergency Department by private vehicle. Chief Complaint   Rectal Bleeding (rectal bleeding with bm x 5-6 months. c/o mid to right abd occasionally radiating to back intermittently x 5-6 months but worse x 5-6 days. was here last night; labs were taken but no scan. was given script for antacid. ) and Abdominal Pain    Mr. Ashanti Lehman is a 25year old male presenting for evaluation of abdominal pain and rectal bleeding. He has no significant PMHx. These symptoms have been present over the past few months, but have worsened over the last 4-5 days. He has not been able to eat x4 days and has not been able to hold fluids down for 2 days. He came to the ED yesterday and was sent home with antacids, he did not fill or take them. He returned due to the increased intensity of the pain. His pain is located in the RLQ w/ some radiation to his R lower back. Describes pain as burning and sharp and rates a 7-8/10. Pain initially was intermittent but has now become constant w/ no relief. He has tried ibuprofen and aleve for the pain w/ no improvement. Eating and drinking makes the pain worst.  Has associated nausea and vomiting, does also have intermittent constipation with diarrhea in between. Does also describe chronic rectal bleeding with all BMs. States that at some points the blood is enough to turn the toilet water red, but this does not occur w/ every BM. He does have associated LH and dizziness, with an episode of syncope yesterday that occurred after eating w/ emesis. Does also have LH and dizziness with standing. He denies any blood in the vomit. He did state he lost consciousness for 1-2 minutes, but did not hit his head.       ROS: +abdominal pain, +n/v/d, +constipation, +bright red blood in stool, +LH/dizziness, +syncope, +h/a, +weakness and fatigue, +dyspnea,  No fevers or chills, no night sweats, denies weight loss/gain,  denies urinary sxs, denies hematuria, dysuria, or urgency, denies easy bruising or bleeding       Past Medical History:   Diagnosis Date    Anxiety     Carpal tunnel syndrome     Depression     Headache     Pneumothorax      Family History   Problem Relation Age of Onset    Diabetes Father     Heart Attack Maternal Grandmother     Heart Disease Maternal Grandmother     Prostate Cancer Maternal Grandfather     Cancer Paternal Simmie Bright Prostate Cancer Paternal Grandfather    ([id number])  No current facility-administered medications for this encounter. Current Outpatient Medications   Medication Sig Dispense Refill    famotidine (PEPCID) 20 MG tablet Take 2 tablets by mouth daily 60 tablet 0    promethazine (PHENERGAN) 12.5 MG tablet Take 12.5 mg by mouth every 6 hours as needed for Nausea      ondansetron (ZOFRAN ODT) 4 MG disintegrating tablet Take 1 tablet by mouth every 8 hours as needed for Nausea or Vomiting 30 tablet 1    naproxen (NAPROSYN) 500 MG tablet Take 1 tablet by mouth 2 times daily (with meals) 30 tablet 0    valACYclovir (VALTREX) 1 g tablet Take 2 tablets by mouth daily 30 tablet 0    ibuprofen (IBU) 600 MG tablet Take 1 tablet by mouth every 6 hours as needed for Pain for 20 doses.  20 tablet 0     Allergies   Allergen Reactions    Cyclobenzaprine Other (See Comments)    Diclofenac Shortness Of Breath    Sulfamethoxazole-Trimethoprim Shortness Of Breath    Sulfa Antibiotics      Social History     Socioeconomic History    Marital status: Single     Spouse name: Not on file    Number of children: Not on file    Years of education: Not on file    Highest education level: Not on file   Occupational History    Not on file   Social Needs    Financial resource strain: Not on file    Food insecurity:     Worry: Not on file     Inability: Not on file   Aniceto Barba Transportation needs:     Medical: Not on file     Non-medical: Not on file   Tobacco Use    Smoking status: Former Smoker     Packs/day: 1.00     Years: 10.00     Pack years: 10.00     Types: Cigarettes     Last attempt to quit: 2013     Years since quittin.3    Smokeless tobacco: Never Used   Substance and Sexual Activity    Alcohol use: No     Comment: occasionally    Drug use: No     Comment: past history of heroin use    Sexual activity: Yes   Lifestyle    Physical activity:     Days per week: Not on file     Minutes per session: Not on file    Stress: Not on file   Relationships    Social connections:     Talks on phone: Not on file     Gets together: Not on file     Attends Baptism service: Not on file     Active member of club or organization: Not on file     Attends meetings of clubs or organizations: Not on file     Relationship status: Not on file    Intimate partner violence:     Fear of current or ex partner: Not on file     Emotionally abused: Not on file     Physically abused: Not on file     Forced sexual activity: Not on file   Other Topics Concern    Not on file   Social History Narrative    Works as a  at [employer]    Has a 3year old girl    Good relationship with GF who is a      Review of Systems  See HPI     Physical Exam     /78   Pulse 70   Temp 98.7 °F (37.1 °C) (Oral)   Resp 17   Ht 6' (1.829 m)   Wt 185 lb (83.9 kg)   SpO2 97%   BMI 25.09 kg/m²     Gen:  Pt is lying comfortably in bed. In no acute distress. Alert and oriented x 4. Speech is fluid. Capillary refill <2 sec. HEENT:  Head:  Normocephalic, atraumatic Eyes:  PERRLA, EOMI, no scleral icterus or conjunctival injection, Nose: nares patent, no d/c, Throat:  Oral mucosa moist and pink, dentition in good repair.    Neck:  Supple, trachea midline, no masses   Lungs:  Symmetrical chest expansion, no accessory muscle use, CTA bilaterally, no wheezes, rales or rhonchi   CV:  RRR, S1/S2 present, no S3/S4, no murmurs, rubs, or gallops, no JVD, radial, brachial, posterior tibial pulses 2+ and equal bilaterally   Abd:  Soft, non distended, diffusely mildly tender to palpation, BS present in all 4 quadrants, no rebound or guarding, no HSM  Neuro:  CN II-XII grossly intact, no focal deficits         ED Course     Studies: CT of Abd and Pelvis w/ Contrast 4/2/2019  Impression:  -No acute intra-abdominal or intrapelvic abnormality or intrapelvic abnormality noted   -appendix visualized and appears normal     Records Reviewed: reviewed 4/2/2019    Treatments: Zofran, Toradol, and Bentyl     Assessment and Plan  Assesment:   1. Abdominal Pain   2. Rectal Bleed   3.   Nausea/Vomiting     Plan:   -Pt has an appt to f/u with GI Dr. Maxi Pinto tomorrow  -use Phenergen for nausea, Protonix for possible ulcer, can use Bentyl as needed for abdominal pain   -Rectal bleeding most likely due to hemorrhoids will Rx Anusol for pain and bleeding  -will d/c with Rx for Protonix, Phenergan, Bentyl, and Anusol     [employer]   [facility] IV 4/2/2019     [employer]  04/02/19 2379

## 2019-04-05 ENCOUNTER — HOSPITAL ENCOUNTER (OUTPATIENT)
Dept: ULTRASOUND IMAGING | Age: 29
Discharge: HOME | End: 2019-04-05
Payer: COMMERCIAL

## 2019-04-05 DIAGNOSIS — R10.11 ABDOMINAL PAIN, ACUTE, RIGHT UPPER QUADRANT: ICD-10-CM

## 2019-04-05 PROCEDURE — 76705 ECHO EXAM OF ABDOMEN: CPT

## 2019-04-05 NOTE — ED PROVIDER NOTES
CHIEF COMPLAINT  Abdominal Pain (pain, small blood in stool for past couple of weeks, diarrhea and vomiting for the past day, c/o dizziness)      HISTORY OF PRESENT ILLNESS  Desiree Osuna is a 25 y.o. male who presents to the ED complaining of small amount of dark blood in stool past couple weeks. Pt does report hx GERD. Has not had egd or colonoscopy. He Says also sometimes is getting dizzy and lightheaded. .   No other complaints, modifying factors or associated symptoms. I have reviewed the following from the nursing documentation.     Past Medical History:   Diagnosis Date    Anxiety     Carpal tunnel syndrome     Depression     Headache     Pneumothorax      Past Surgical History:   Procedure Laterality Date    CHEST TUBE INSERTION       Family History   Problem Relation Age of Onset    Diabetes Father     Heart Attack Maternal Grandmother     Heart Disease Maternal Grandmother     Prostate Cancer Maternal Grandfather     Cancer Paternal Grandfather     Prostate Cancer Paternal Grandfather      Social History     Socioeconomic History    Marital status: Single     Spouse name: Not on file    Number of children: Not on file    Years of education: Not on file    Highest education level: Not on file   Occupational History    Not on file   Social Needs    Financial resource strain: Not on file    Food insecurity:     Worry: Not on file     Inability: Not on file    Transportation needs:     Medical: Not on file     Non-medical: Not on file   Tobacco Use    Smoking status: Former Smoker     Packs/day: 1.00     Years: 10.00     Pack years: 10.00     Types: Cigarettes     Last attempt to quit: 2013     Years since quittin.3    Smokeless tobacco: Never Used   Substance and Sexual Activity    Alcohol use: No     Comment: occasionally    Drug use: No     Comment: past history of heroin use    Sexual activity: Yes   Lifestyle    Physical activity:     Days per week: Not on file Minutes per session: Not on file    Stress: Not on file   Relationships    Social connections:     Talks on phone: Not on file     Gets together: Not on file     Attends Cheondoism service: Not on file     Active member of club or organization: Not on file     Attends meetings of clubs or organizations: Not on file     Relationship status: Not on file    Intimate partner violence:     Fear of current or ex partner: Not on file     Emotionally abused: Not on file     Physically abused: Not on file     Forced sexual activity: Not on file   Other Topics Concern    Not on file   Social History Narrative    Works as a  at [employer]    Has a 3year old girl    Good relationship with CARLYN who is a      No current facility-administered medications for this encounter. Current Outpatient Medications   Medication Sig Dispense Refill    famotidine (PEPCID) 20 MG tablet Take 2 tablets by mouth daily 60 tablet 0    ondansetron (ZOFRAN ODT) 4 MG disintegrating tablet Take 1 tablet by mouth every 8 hours as needed for Nausea or Vomiting 30 tablet 1    valACYclovir (VALTREX) 1 g tablet Take 2 tablets by mouth daily 30 tablet 0    pantoprazole (PROTONIX) 40 MG tablet Take 1 tablet by mouth every morning (before breakfast) 30 tablet 0    promethazine (PHENERGAN) 25 MG tablet Take 1 tablet by mouth every 6 hours as needed for Nausea 20 tablet 0    dicyclomine (BENTYL) 10 MG capsule Take 1 capsule by mouth 4 times daily (before meals and nightly) for 7 days 28 capsule 0    hydrocortisone (ANUSOL-HC) 25 MG suppository Place 1 suppository rectally 2 times daily for 15 days 30 suppository 0     Allergies   Allergen Reactions    Cyclobenzaprine Other (See Comments)    Diclofenac Shortness Of Breath    Sulfamethoxazole-Trimethoprim Shortness Of Breath    Sulfa Antibiotics        REVIEW OF SYSTEMS  10 systems reviewed, pertinent positives per HPI otherwise noted to be negative.     PHYSICAL EXAM  /75 Pulse 85   Temp 99.1 °F (37.3 °C) (Oral)   Resp 16   Ht 6' (1.829 m)   Wt 185 lb (83.9 kg)   SpO2 99%   BMI 25.09 kg/m²   GENERAL APPEARANCE: Awake and alert. Cooperative. No acute distress. HEAD: Normocephalic. Atraumatic. EYES: PERRL. EOM's grossly intact. ENT: Mucous membranes are moist.   NECK: Supple. HEART: RRR. LUNGS: Respirations unlabored. CTAB. Good air exchange. Speaking comfortably in full sentences. BACK: No midline spinal tenderness or step-off. ABDOMEN: Soft. Non-distended. Non-tender. No guarding or rebound. Normal bowel sounds. EXTREMITIES: No peripheral edema. Moves all extremities equally. All extremities neurovascularly intact. SKIN: Warm and dry. No acute rashes. NEUROLOGICAL: Alert and oriented. No gross facial drooping. Strength 5/5, sensation intact. Normal coordination. Gait normal.       LABS  I have reviewed all labs for this visit.    Results for orders placed or performed during the hospital encounter of 04/01/19   CBC Auto Differential   Result Value Ref Range    WBC 7.7 4.0 - 11.0 K/uL    RBC 5.07 4.20 - 5.90 M/uL    Hemoglobin 15.6 13.5 - 17.5 g/dL    Hematocrit 45.1 40.5 - 52.5 %    MCV 89.0 80.0 - 100.0 fL    MCH 30.7 26.0 - 34.0 pg    MCHC 34.5 31.0 - 36.0 g/dL    RDW 13.4 12.4 - 15.4 %    Platelets 179 880 - 845 K/uL    MPV 9.0 5.0 - 10.5 fL    Neutrophils % 71.6 %    Lymphocytes % 18.4 %    Monocytes % 7.6 %    Eosinophils % 1.7 %    Basophils % 0.7 %    Neutrophils # 5.5 1.7 - 7.7 K/uL    Lymphocytes # 1.4 1.0 - 5.1 K/uL    Monocytes # 0.6 0.0 - 1.3 K/uL    Eosinophils # 0.1 0.0 - 0.6 K/uL    Basophils # 0.1 0.0 - 0.2 K/uL   Lipase   Result Value Ref Range    Lipase 45.0 13.0 - 60.0 U/L   Comprehensive Metabolic Panel   Result Value Ref Range    Sodium 139 136 - 145 mmol/L    Potassium 4.2 3.5 - 5.1 mmol/L    Chloride 101 99 - 110 mmol/L    CO2 28 21 - 32 mmol/L    Anion Gap 10 3 - 16    Glucose 87 70 - 99 mg/dL    BUN 22 (H) 7 - 20 mg/dL    CREATININE 0.8 (L) 0.9 - 1.3 mg/dL    GFR Non-African American >60 >60    GFR African American >60 >60    Calcium 8.8 8.3 - 10.6 mg/dL    Total Protein 7.0 6.4 - 8.2 g/dL    Alb 4.2 3.4 - 5.0 g/dL    Albumin/Globulin Ratio 1.5 1.1 - 2.2    Total Bilirubin 0.4 0.0 - 1.0 mg/dL    Alkaline Phosphatase 37 (L) 40 - 129 U/L    ALT 13 10 - 40 U/L    AST 16 15 - 37 U/L    Globulin 2.8 g/dL   Urinalysis Reflex to Culture   Result Value Ref Range    Color, UA Yellow Straw/Yellow    Clarity, UA Clear Clear    Glucose, Ur Negative Negative mg/dL    Bilirubin Urine Negative Negative    Ketones, Urine Negative Negative mg/dL    Specific Gravity, UA 1.025 1.005 - 1.030    Blood, Urine Negative Negative    pH, UA 6.5 5.0 - 8.0    Protein, UA Negative Negative mg/dL    Urobilinogen, Urine 0.2 <2.0 E.U./dL    Nitrite, Urine Negative Negative    Leukocyte Esterase, Urine Negative Negative    Microscopic Examination Not Indicated     Urine Reflex to Culture Not Indicated     Urine Type Not Specified        ED COURSE/MDM  Patient seen and evaluated. Labs and imaging reviewed and results discussed with patient. Plan of care discussed with patient. Patient in agreement with plan. I told the patient they can come back to the ER at any time if the S/S persist or worsen or they have any other S/S of concern. I estimate there is LOW risk for CHOLECYSTITIS, ACUTE PANCREATITIS, MESENTERIC ISCHEMIA, AAA, APPENDICITIS, ABSCESS, BOWEL OBSTRUCTION, INCARCERATED HERNIA, PERFORATED BOWEL, COMPLICATED DIVERTICULITIS, ULCER and thus I consider the discharge disposition reasonable. Mary Jo and I have discussed the diagnosis and risks, and we agree with discharging home to follow-up with their primary doctor. We also discussed returning to the Emergency Department immediately if new or worsening symptoms occur.  We have discussed the symptoms which are most concerning (e.g., bloody sputum, fever, worsening pain or shortness of breath, vomiting, weakness) that necessitate immediate return. Discharge Medication List as of 4/2/2019  1:15 AM      START taking these medications    Details   famotidine (PEPCID) 20 MG tablet Take 2 tablets by mouth daily, Disp-60 tablet, R-0Print             CLINICAL IMPRESSION  1. Hematochezia    2. Generalized abdominal pain    3. Gastroesophageal reflux disease, esophagitis presence not specified        Blood pressure 111/75, pulse 85, temperature 99.1 °F (37.3 °C), temperature source Oral, resp. rate 16, height 6' (1.829 m), weight 185 lb (83.9 kg), SpO2 99 %. DISPOSITION  Regan Matthews was discharged to home in stable condition. This chart was generated in part by using Dragon Dictation system and may contain errors related to that system including errors in grammar, punctuation, and spelling, as well as words and phrases that may be inappropriate. When dictating, effort is made to correct spelling/grammar errors. If there are any questions or concerns please feel free to contact the dictating provider for clarification.      Barbara Covington DO  EMERGENCY [address] [place] [address],   04/05/19 [facility]

## 2019-04-11 RX ORDER — VALACYCLOVIR HYDROCHLORIDE 1 G/1
2000 TABLET, FILM COATED ORAL DAILY
Qty: 30 TABLET | Refills: 2 | Status: SHIPPED | OUTPATIENT
Start: 2019-04-11 | End: 2020-04-23 | Stop reason: SDUPTHER

## 2019-05-07 ENCOUNTER — OFFICE VISIT (OUTPATIENT)
Dept: FAMILY MEDICINE CLINIC | Age: 29
End: 2019-05-07
Payer: COMMERCIAL

## 2019-05-07 VITALS
TEMPERATURE: 98.1 F | HEART RATE: 62 BPM | BODY MASS INDEX: 24.68 KG/M2 | OXYGEN SATURATION: 99 % | WEIGHT: 182 LBS | SYSTOLIC BLOOD PRESSURE: 124 MMHG | DIASTOLIC BLOOD PRESSURE: 72 MMHG | RESPIRATION RATE: 18 BRPM

## 2019-05-07 DIAGNOSIS — M25.562 ACUTE PAIN OF LEFT KNEE: Primary | ICD-10-CM

## 2019-05-07 PROCEDURE — G8420 CALC BMI NORM PARAMETERS: HCPCS | Performed by: FAMILY MEDICINE

## 2019-05-07 PROCEDURE — 99213 OFFICE O/P EST LOW 20 MIN: CPT | Performed by: FAMILY MEDICINE

## 2019-05-07 PROCEDURE — 1036F TOBACCO NON-USER: CPT | Performed by: FAMILY MEDICINE

## 2019-05-07 PROCEDURE — G8427 DOCREV CUR MEDS BY ELIG CLIN: HCPCS | Performed by: FAMILY MEDICINE

## 2019-05-07 ASSESSMENT — ENCOUNTER SYMPTOMS: BACK PAIN: 1

## 2019-05-07 NOTE — PROGRESS NOTES
Thomas Charlton is a 25 y.o. male    Chief Complaint   Patient presents with    Knee Pain     left       HPI:    Knee Pain    The incident occurred 3 to 5 days ago. Incident location: hiking. Injury mechanism: fell on right knee but left knee hurts. The pain is present in the left knee (has chronic back pain). Pertinent negatives include no inability to bear weight. He reports no foreign bodies present. The symptoms are aggravated by movement and palpation. He has tried nothing for the symptoms. ROS:    Review of Systems   Musculoskeletal: Positive for back pain (chronic). /72   Pulse 62   Temp 98.1 °F (36.7 °C) (Oral)   Resp 18   Wt 182 lb (82.6 kg)   SpO2 99%   BMI 24.68 kg/m²     Physical Exam:    Physical Exam   Constitutional: He appears well-developed. No distress. Musculoskeletal:        Left knee: He exhibits bony tenderness. He exhibits normal meniscus. Tenderness found. Patellar tendon tenderness noted. No medial joint line and no lateral joint line tenderness noted. Skin: He is not diaphoretic. Psychiatric: He has a normal mood and affect. His behavior is normal.       Current Outpatient Medications   Medication Sig Dispense Refill    valACYclovir (VALTREX) 1 g tablet Take 2 tablets by mouth daily 30 tablet 2    famotidine (PEPCID) 20 MG tablet Take 2 tablets by mouth daily 60 tablet 0    pantoprazole (PROTONIX) 40 MG tablet Take 1 tablet by mouth every morning (before breakfast) 30 tablet 0    ondansetron (ZOFRAN ODT) 4 MG disintegrating tablet Take 1 tablet by mouth every 8 hours as needed for Nausea or Vomiting 30 tablet 1    dicyclomine (BENTYL) 10 MG capsule Take 1 capsule by mouth 4 times daily (before meals and nightly) for 7 days 28 capsule 0     No current facility-administered medications for this visit. Assessment:    1. Acute pain of left knee        Plan:    1. Acute pain of left knee  Likely patellar tendonitis.  Ice, try wearing a brace, use Tylenol prn pain and do exercises. - XR KNEE LEFT (1-2 VIEWS); Future      Patient to return to clinic if symptoms worsen or fail to improve.

## 2019-05-08 ENCOUNTER — TELEPHONE (OUTPATIENT)
Dept: FAMILY MEDICINE CLINIC | Age: 29
End: 2019-05-08

## 2019-05-09 ENCOUNTER — OFFICE VISIT (OUTPATIENT)
Dept: FAMILY MEDICINE CLINIC | Age: 29
End: 2019-05-09
Payer: COMMERCIAL

## 2019-05-09 VITALS
BODY MASS INDEX: 24.63 KG/M2 | DIASTOLIC BLOOD PRESSURE: 70 MMHG | WEIGHT: 181.6 LBS | SYSTOLIC BLOOD PRESSURE: 130 MMHG | OXYGEN SATURATION: 98 % | HEART RATE: 94 BPM

## 2019-05-09 DIAGNOSIS — R29.898 LEFT ARM WEAKNESS: ICD-10-CM

## 2019-05-09 DIAGNOSIS — F33.2 SEVERE EPISODE OF RECURRENT MAJOR DEPRESSIVE DISORDER, WITHOUT PSYCHOTIC FEATURES (HCC): ICD-10-CM

## 2019-05-09 DIAGNOSIS — K62.5 RECTAL BLEEDING: Primary | ICD-10-CM

## 2019-05-09 DIAGNOSIS — R10.84 GENERALIZED ABDOMINAL PAIN: ICD-10-CM

## 2019-05-09 DIAGNOSIS — R11.0 NAUSEA: ICD-10-CM

## 2019-05-09 PROCEDURE — 99215 OFFICE O/P EST HI 40 MIN: CPT | Performed by: FAMILY MEDICINE

## 2019-05-09 PROCEDURE — 1036F TOBACCO NON-USER: CPT | Performed by: FAMILY MEDICINE

## 2019-05-09 PROCEDURE — G8427 DOCREV CUR MEDS BY ELIG CLIN: HCPCS | Performed by: FAMILY MEDICINE

## 2019-05-09 PROCEDURE — G8420 CALC BMI NORM PARAMETERS: HCPCS | Performed by: FAMILY MEDICINE

## 2019-05-09 RX ORDER — FLUOXETINE HYDROCHLORIDE 20 MG/1
20 CAPSULE ORAL DAILY
Qty: 30 CAPSULE | Refills: 3 | Status: SHIPPED | OUTPATIENT
Start: 2019-05-09 | End: 2019-06-28

## 2019-05-09 RX ORDER — HYDROCORTISONE ACETATE 25 MG/1
25 SUPPOSITORY RECTAL EVERY 12 HOURS
Qty: 24 SUPPOSITORY | Refills: 1 | Status: SHIPPED | OUTPATIENT
Start: 2019-05-09 | End: 2019-06-28 | Stop reason: ALTCHOICE

## 2019-05-09 RX ORDER — METHYLPREDNISOLONE 4 MG/1
TABLET ORAL
Qty: 1 KIT | Refills: 0 | Status: SHIPPED | OUTPATIENT
Start: 2019-05-09 | End: 2019-05-15

## 2019-05-09 RX ORDER — ONDANSETRON 4 MG/1
4 TABLET, FILM COATED ORAL DAILY PRN
Qty: 30 TABLET | Refills: 0 | Status: SHIPPED | OUTPATIENT
Start: 2019-05-09 | End: 2019-06-28 | Stop reason: ALTCHOICE

## 2019-05-09 RX ORDER — TIZANIDINE 2 MG/1
2 TABLET ORAL NIGHTLY PRN
Qty: 10 TABLET | Refills: 0 | Status: SHIPPED | OUTPATIENT
Start: 2019-05-09 | End: 2019-06-28 | Stop reason: ALTCHOICE

## 2019-05-09 RX ORDER — FLUOXETINE 10 MG/1
10 CAPSULE ORAL DAILY
Qty: 7 CAPSULE | Refills: 0 | Status: SHIPPED | OUTPATIENT
Start: 2019-05-09 | End: 2019-06-28 | Stop reason: ALTCHOICE

## 2019-05-09 NOTE — LETTER
[address] [facility]  [address] Kaur [address] [facility] [address]  Phone: [phone]  Fax: [phone]    Sabrina Cummins MD        May 9, 2019     Patient: Lorraine Marie   YOB: 1990   Date of Visit: 5/9/2019       To Whom It May Concern: It is my medical opinion that Ashanti Lehman may return to work on 5/10/19. He was seen in my office today, 5/9/19    If you have any questions or concerns, please don't hesitate to call.     Sincerely,          Sabrina Cummins MD

## 2019-05-09 NOTE — PROGRESS NOTES
2019    This is a 25 y.o. male who presents for  Chief Complaint   Patient presents with    Rectal Bleeding     Has seen the GI doctor but they are not doing anything    Abdominal Pain    Back Pain    Shoulder Pain       HPI:     EGD and Colonoscopy 1 mo ago by [facility]  Horrible experience  \"Just internal hemorrhoids \"  Took a biopsy\" but lost it\"  Only gave him Prilosec     Bleeding has gotten worse   No rectal pain  Has rectal fullness    + RLQ pain  Constant  Had US for appendix and GB, everything was normal   ?blood tests     No appetite now  Weight is fluctuating    + n/v  ?H pylori   No hematemesis  Melena sometimes     + constipation  Every couple days, 2-3   Balls and large hard pieces  Sometimes mucous     protonix for 2 weeks, seeing no change    Left shoulder: can't lift anymore  Hx of labral correction in the past  Feels weak  No injury  Progressive  No n/t/w in arm/hand    Moods are not improved  Had to stop wellbutrin due to inc anxiety  Had SI on Zoloft in the past  No SI/HI  No recurrent drug use     Past Medical History:   Diagnosis Date    Anxiety     Carpal tunnel syndrome     Depression     Headache     Pneumothorax        Past Surgical History:   Procedure Laterality Date    CHEST TUBE INSERTION         Social History     Socioeconomic History    Marital status: Single     Spouse name: Not on file    Number of children: Not on file    Years of education: Not on file    Highest education level: Not on file   Occupational History    Not on file   Social Needs    Financial resource strain: Not on file    Food insecurity:     Worry: Not on file     Inability: Not on file    Transportation needs:     Medical: Not on file     Non-medical: Not on file   Tobacco Use    Smoking status: Former Smoker     Packs/day: 1.00     Years: 10.00     Pack years: 10.00     Types: Cigarettes     Last attempt to quit: 2013     Years since quittin.4    Smokeless tobacco: Never  valACYclovir (VALTREX) 1 g tablet Take 2 tablets by mouth daily 30 tablet 2    pantoprazole (PROTONIX) 40 MG tablet Take 1 tablet by mouth every morning (before breakfast) 30 tablet 0     No current facility-administered medications for this visit. Immunization History   Administered Date(s) Administered    Tdap (Boostrix, Adacel) 03/09/2017, 10/16/2018       Allergies   Allergen Reactions    Cyclobenzaprine Other (See Comments)    Diclofenac Shortness Of Breath    Sulfamethoxazole-Trimethoprim Shortness Of Breath    Sulfa Antibiotics        Review of Systems   Constitutional: Positive for activity change, appetite change and fatigue. Negative for chills, diaphoresis, fever and unexpected weight change. Respiratory: Negative for chest tightness and shortness of breath. Cardiovascular: Negative for chest pain, palpitations and leg swelling. Gastrointestinal: Positive for abdominal pain, anal bleeding, blood in stool, constipation, nausea and vomiting. Negative for abdominal distention, diarrhea and rectal pain. Genitourinary: Negative for difficulty urinating and dysuria. Musculoskeletal: Positive for arthralgias and myalgias. Negative for back pain, gait problem, joint swelling, neck pain and neck stiffness. Skin: Negative for color change, pallor, rash and wound. Neurological: Positive for weakness. Negative for dizziness, tremors, seizures, light-headedness, numbness and headaches. Psychiatric/Behavioral: Positive for decreased concentration, dysphoric mood and sleep disturbance. Negative for agitation, behavioral problems, confusion, hallucinations, self-injury and suicidal ideas. The patient is nervous/anxious. The patient is not hyperactive. /70 (Site: Right Upper Arm, Position: Sitting, Cuff Size: Medium Adult)   Pulse 94   Wt 181 lb 9.6 oz (82.4 kg)   SpO2 98%   BMI 24.63 kg/m²     Physical Exam   Constitutional: He is oriented to person, place, and time.  He appears well-developed and well-nourished. No distress. HENT:   Head: Normocephalic and atraumatic. Eyes: Pupils are equal, round, and reactive to light. Conjunctivae and EOM are normal.   Neck: Normal range of motion. Neck supple. Cardiovascular: Normal rate, regular rhythm, normal heart sounds and intact distal pulses. Exam reveals no gallop and no friction rub. No murmur heard. Pulmonary/Chest: Effort normal and breath sounds normal. No respiratory distress. He has no wheezes. He has no rales. Abdominal: Soft. Bowel sounds are normal. He exhibits no distension and no mass. There is tenderness. There is no rebound and no guarding. No hernia. Genitourinary: Rectal exam shows internal hemorrhoid and tenderness. Rectal exam shows no fissure, no mass, anal tone normal and guaiac negative stool. Musculoskeletal: He exhibits no edema, tenderness or deformity. Lymphadenopathy:     He has no cervical adenopathy. Neurological: He is alert and oriented to person, place, and time. No cranial nerve deficit or sensory deficit. He exhibits normal muscle tone. Coordination normal.   Skin: Skin is warm and dry. Capillary refill takes 2 to 3 seconds. No rash noted. He is not diaphoretic. No erythema. No pallor. Psychiatric: His speech is normal and behavior is normal. Judgment and thought content normal. He is not actively hallucinating. Cognition and memory are normal. He is attentive. Nursing note and vitals reviewed. Left Shoulder Exam     Tenderness   The patient is experiencing no tenderness.      Range of Motion   Active abduction: abnormal   Passive abduction: abnormal   Extension: abnormal   External rotation: normal   Forward flexion: normal   Internal rotation 90 degrees: normal     Muscle Strength   Abduction: 4/5   Internal rotation: 5/5   External rotation: 4/5   Supraspinatus: 4/5   Subscapularis: 4/5   Biceps: 5/5     Tests   Cross arm: positive  Drop arm: positive    Other

## 2019-05-09 NOTE — PATIENT INSTRUCTIONS
Patient Education        Hemorrhoids: Care Instructions  Your Care Instructions    Hemorrhoids are enlarged veins that develop in the anal canal. Bleeding during bowel movements, itching, swelling, and rectal pain are the most common symptoms. They can be uncomfortable at times, but hemorrhoids rarely are a serious problem. You can treat most hemorrhoids with simple changes to your diet and bowel habits. These changes include eating more fiber and not straining to pass stools. Most hemorrhoids do not need surgery or other treatment unless they are very large and painful or bleed a lot. Follow-up care is a key part of your treatment and safety. Be sure to make and go to all appointments, and call your doctor if you are having problems. It's also a good idea to know your test results and keep a list of the medicines you take. How can you care for yourself at home? · Sit in a few inches of warm water (sitz bath) 3 times a day and after bowel movements. The warm water helps with pain and itching. · Put ice on your anal area several times a day for 10 minutes at a time. Put a thin cloth between the ice and your skin. Follow this by placing a warm, wet towel on the area for another 10 to 20 minutes. · Take pain medicines exactly as directed. ? If the doctor gave you a prescription medicine for pain, take it as prescribed. ? If you are not taking a prescription pain medicine, ask your doctor if you can take an over-the-counter medicine. · Keep the anal area clean, but be gentle. Use water and a fragrance-free soap, such as [place], or use baby wipes or medicated pads, such as Tucks. · Wear cotton underwear and loose clothing to decrease moisture in the anal area. · Eat more fiber. Include foods such as whole-grain breads and cereals, raw vegetables, raw and dried fruits, and beans. · Drink plenty of fluids, enough so that your urine is light yellow or clear like water.  If you have kidney, heart, or liver disease and have to limit fluids, talk with your doctor before you increase the amount of fluids you drink. · Use a stool softener that contains bran or psyllium. You can save money by buying bran or psyllium (available in bulk at most health food stores) and sprinkling it on foods or stirring it into fruit juice. Or you can use a product such as Metamucil or Hydrocil. · Practice healthy bowel habits. ? Go to the bathroom as soon as you have the urge. ? Avoid straining to pass stools. Relax and give yourself time to let things happen naturally. ? Do not hold your breath while passing stools. ? Do not read while sitting on the toilet. Get off the toilet as soon as you have finished. · Take your medicines exactly as prescribed. Call your doctor if you think you are having a problem with your medicine. When should you call for help? Call 911 anytime you think you may need emergency care. For example, call if:    · You pass maroon or very bloody stools.    Call your doctor now or seek immediate medical care if:    · You have increased pain.     · You have increased bleeding.    Watch closely for changes in your health, and be sure to contact your doctor if:    · Your symptoms have not improved after 3 or 4 days. Where can you learn more? Go to https://[employer]. org and sign in to your [employer] account. Enter E322 in the [facility] box to learn more about \"Hemorrhoids: Care Instructions. \"     If you do not have an account, please click on the \"Sign Up Now\" link. Current as of: November 7, 2018  Content Version: 12.0  © 7359-6381 [employer]. Care instructions adapted under license by [facility] ([facility]). If you have questions about a medical condition or this instruction, always ask your healthcare professional. [address] any warranty or liability for your use of this information.

## 2019-05-10 ASSESSMENT — ENCOUNTER SYMPTOMS
RECTAL PAIN: 0
BACK PAIN: 0
SHORTNESS OF BREATH: 0
COLOR CHANGE: 0
DIARRHEA: 0
NAUSEA: 1
ABDOMINAL PAIN: 1
CONSTIPATION: 1
BLOOD IN STOOL: 1
ANAL BLEEDING: 1
CHEST TIGHTNESS: 0
VOMITING: 1
ABDOMINAL DISTENTION: 0

## 2019-05-16 ENCOUNTER — TELEPHONE (OUTPATIENT)
Dept: FAMILY MEDICINE CLINIC | Age: 29
End: 2019-05-16

## 2019-05-16 DIAGNOSIS — F31.9 BIPOLAR 1 DISORDER (HCC): ICD-10-CM

## 2019-05-16 DIAGNOSIS — Z78.9 PATIENT REQUESTS ALTERNATE TREATMENT: Primary | ICD-10-CM

## 2019-05-16 NOTE — TELEPHONE ENCOUNTER
Pt's wife called (not on HIPAA) said he was just diagnosed with bi-polar. Saw a counselor in [place] last night. Told him to stop taking the prozac because it will make the bi-polar worse.  Needs referral to a psychiatrist. Needs someone who will take medical mutual.

## 2019-05-17 NOTE — TELEPHONE ENCOUNTER
Referral to psychiatry placed per pt's SO's request.  Please call with information. As far as insurance coverage, she will need to call [facility] individually to see if they are covered under her plan. If they are not, the easiest route is to call insurance directly to see which psychiatrist is covered. Thanks.

## 2019-06-20 NOTE — TELEPHONE ENCOUNTER
Attached new HIPAA form to chart. Wife is now on it. Lvm for her to call. Note faxed with the HIPAA form from [facility] said she is wanting the number of the psychologist so she can get him scheduled. [phone] is the number on the referral. The provider listed is Yves Rossi. Per referral can schedule with him or first available.

## 2019-06-24 ENCOUNTER — TELEPHONE (OUTPATIENT)
Dept: FAMILY MEDICINE CLINIC | Age: 29
End: 2019-06-24

## 2019-06-25 NOTE — TELEPHONE ENCOUNTER
Spoke to wife and got pt scheduled with ottoniel. Pt wife states that pt condition is getting so bad he is unable to function. Got pt in asap.

## 2019-06-28 ENCOUNTER — OFFICE VISIT (OUTPATIENT)
Dept: FAMILY MEDICINE CLINIC | Age: 29
End: 2019-06-28
Payer: COMMERCIAL

## 2019-06-28 VITALS
OXYGEN SATURATION: 99 % | DIASTOLIC BLOOD PRESSURE: 88 MMHG | WEIGHT: 180 LBS | BODY MASS INDEX: 24.41 KG/M2 | SYSTOLIC BLOOD PRESSURE: 131 MMHG | HEART RATE: 56 BPM

## 2019-06-28 DIAGNOSIS — F41.0 PANIC ATTACK: ICD-10-CM

## 2019-06-28 DIAGNOSIS — F31.4 BIPOLAR 1 DISORDER, DEPRESSED, SEVERE (HCC): Primary | ICD-10-CM

## 2019-06-28 DIAGNOSIS — F41.1 GENERALIZED ANXIETY DISORDER: ICD-10-CM

## 2019-06-28 PROCEDURE — 99214 OFFICE O/P EST MOD 30 MIN: CPT | Performed by: FAMILY MEDICINE

## 2019-06-28 PROCEDURE — G8427 DOCREV CUR MEDS BY ELIG CLIN: HCPCS | Performed by: FAMILY MEDICINE

## 2019-06-28 PROCEDURE — 1036F TOBACCO NON-USER: CPT | Performed by: FAMILY MEDICINE

## 2019-06-28 PROCEDURE — G8420 CALC BMI NORM PARAMETERS: HCPCS | Performed by: FAMILY MEDICINE

## 2019-06-28 PROCEDURE — G0444 DEPRESSION SCREEN ANNUAL: HCPCS | Performed by: FAMILY MEDICINE

## 2019-06-28 RX ORDER — LAMOTRIGINE 25 MG/1
25 TABLET ORAL DAILY
Qty: 14 TABLET | Refills: 0 | Status: SHIPPED | OUTPATIENT
Start: 2019-06-28 | End: 2019-09-18 | Stop reason: SDUPTHER

## 2019-06-28 RX ORDER — LAMOTRIGINE 25 MG/1
50 TABLET ORAL DAILY
Qty: 28 TABLET | Refills: 0 | Status: SHIPPED | OUTPATIENT
Start: 2019-07-13 | End: 2019-09-27 | Stop reason: ALTCHOICE

## 2019-06-28 RX ORDER — PROMETHAZINE HYDROCHLORIDE 12.5 MG/1
12.5-25 TABLET ORAL NIGHTLY
Qty: 60 TABLET | Refills: 2 | Status: SHIPPED | OUTPATIENT
Start: 2019-06-28 | End: 2019-09-27 | Stop reason: ALTCHOICE

## 2019-06-28 ASSESSMENT — ENCOUNTER SYMPTOMS
BACK PAIN: 0
ABDOMINAL PAIN: 0
VOMITING: 0
COLOR CHANGE: 0
NAUSEA: 0
SHORTNESS OF BREATH: 0
CHEST TIGHTNESS: 0

## 2019-06-28 ASSESSMENT — ANXIETY QUESTIONNAIRES
GAD7 TOTAL SCORE: 20
7. FEELING AFRAID AS IF SOMETHING AWFUL MIGHT HAPPEN: 3-NEARLY EVERY DAY
4. TROUBLE RELAXING: 3-NEARLY EVERY DAY
1. FEELING NERVOUS, ANXIOUS, OR ON EDGE: 3-NEARLY EVERY DAY
5. BEING SO RESTLESS THAT IT IS HARD TO SIT STILL: 2-OVER HALF THE DAYS
6. BECOMING EASILY ANNOYED OR IRRITABLE: 3-NEARLY EVERY DAY
2. NOT BEING ABLE TO STOP OR CONTROL WORRYING: 3-NEARLY EVERY DAY
3. WORRYING TOO MUCH ABOUT DIFFERENT THINGS: 3-NEARLY EVERY DAY

## 2019-06-28 ASSESSMENT — PATIENT HEALTH QUESTIONNAIRE - PHQ9
9. THOUGHTS THAT YOU WOULD BE BETTER OFF DEAD, OR OF HURTING YOURSELF: 2
8. MOVING OR SPEAKING SO SLOWLY THAT OTHER PEOPLE COULD HAVE NOTICED. OR THE OPPOSITE, BEING SO FIGETY OR RESTLESS THAT YOU HAVE BEEN MOVING AROUND A LOT MORE THAN USUAL: 1
2. FEELING DOWN, DEPRESSED OR HOPELESS: 2
SUM OF ALL RESPONSES TO PHQ9 QUESTIONS 1 & 2: 5
7. TROUBLE CONCENTRATING ON THINGS, SUCH AS READING THE NEWSPAPER OR WATCHING TELEVISION: 2
SUM OF ALL RESPONSES TO PHQ QUESTIONS 1-9: 20
3. TROUBLE FALLING OR STAYING ASLEEP: 3
1. LITTLE INTEREST OR PLEASURE IN DOING THINGS: 3
4. FEELING TIRED OR HAVING LITTLE ENERGY: 2
5. POOR APPETITE OR OVEREATING: 2
6. FEELING BAD ABOUT YOURSELF - OR THAT YOU ARE A FAILURE OR HAVE LET YOURSELF OR YOUR FAMILY DOWN: 3
SUM OF ALL RESPONSES TO PHQ QUESTIONS 1-9: 20
10. IF YOU CHECKED OFF ANY PROBLEMS, HOW DIFFICULT HAVE THESE PROBLEMS MADE IT FOR YOU TO DO YOUR WORK, TAKE CARE OF THINGS AT HOME, OR GET ALONG WITH OTHER PEOPLE: 3

## 2019-06-28 NOTE — PATIENT INSTRUCTIONS
Patient Education        lamotrigine  Pronunciation:  la TESFAYE Buckner  Brand: LaMICtal, LaMICtal ODT, LaMICtal XR  What is the most important information I should know about lamotrigine? Lamotrigine may cause a severe or life-threatening skin rash, especially in children and in people who take a very high starting dose, or those who also take valproic acid (Depakene) or divalproex (Depakote). Seek emergency medical attention if you have a skin rash, hives, blistering, peeling, or sores in your mouth or around your eyes. Call your doctor at once if you have signs of other serious side effects, including: fever, swollen glands, severe muscle pain, bruising or unusual bleeding, yellowing of your skin or eyes, headache, neck stiffness, vomiting, confusion, or increased sensitivity to light. Some people have thoughts about suicide while taking lamotrigine. Stay alert to changes in your mood or symptoms. Report any new or worsening symptoms to your doctor. What is lamotrigine? Lamotrigine is an anti-epileptic medication, also called an anticonvulsant. Lamotrigine is used alone or with other medications to treat epileptic seizures in adults and children. Lamotrigine is also used to delay mood episodes in adults with bipolar disorder (manic depression). Immediate-release lamotrigine can be used in children as young as 3years old when it is given as part of a combination of seizure medications. However, this form should not be used as a single medication in a child or teenager who is younger than 12years old. Extended-release lamotrigine is for use only in adults and children who are at least 15years old. Lamotrigine may also be used for purposes not listed in this medication guide. What should I discuss with my healthcare provider before taking lamotrigine? You should not take lamotrigine if you are allergic to it.   Lamotrigine may cause a severe or life-threatening skin rash, especially in children and in

## 2019-07-01 NOTE — PROGRESS NOTES
Absolutely. The note mentioned that a psychologist dx with bipolar, but must have been a previous provider.
change. Respiratory: Negative for chest tightness and shortness of breath. Cardiovascular: Negative for chest pain and palpitations. Gastrointestinal: Negative for abdominal pain, nausea and vomiting. Musculoskeletal: Negative for arthralgias and back pain. Skin: Negative for color change. Neurological: Negative for dizziness, tremors, seizures, weakness, light-headedness, numbness and headaches. Psychiatric/Behavioral: Positive for agitation, decreased concentration, dysphoric mood, sleep disturbance and suicidal ideas. Negative for behavioral problems, confusion, hallucinations and self-injury. The patient is nervous/anxious and is hyperactive. /88 (Site: Left Upper Arm, Position: Sitting, Cuff Size: Medium Adult)   Pulse 56   Wt 180 lb (81.6 kg)   SpO2 99%   BMI 24.41 kg/m²     Physical Exam   Constitutional: He is oriented to person, place, and time. He appears well-developed and well-nourished. No distress. HENT:   Head: Normocephalic and atraumatic. Eyes: Pupils are equal, round, and reactive to light. Conjunctivae and EOM are normal.   Neck: Normal range of motion. Neck supple. Cardiovascular: Normal rate and regular rhythm. Pulmonary/Chest: Effort normal. No respiratory distress. Musculoskeletal: Normal range of motion. Neurological: He is alert and oriented to person, place, and time. Skin: Skin is warm and dry. Capillary refill takes 2 to 3 seconds. He is not diaphoretic. Psychiatric: He has a normal mood and affect. His speech is normal and behavior is normal. Judgment and thought content normal. He is not actively hallucinating. Cognition and memory are normal. He is attentive. Nursing note and vitals reviewed. Plan  1. Bipolar 1 disorder, depressed, severe ([facility] [address]  2. Panic attack  3. Generalized anxiety disorder    Discussed Dx of depression/bipolar/anxiety, etiology, and risks of avoidance of Dx.  Encouraged both medicinal support along with

## 2019-07-08 ENCOUNTER — TELEPHONE (OUTPATIENT)
Dept: FAMILY MEDICINE CLINIC | Age: 29
End: 2019-07-08

## 2019-07-11 ENCOUNTER — TELEPHONE (OUTPATIENT)
Dept: FAMILY MEDICINE CLINIC | Age: 29
End: 2019-07-11

## 2019-07-18 ENCOUNTER — HOSPITAL ENCOUNTER (EMERGENCY)
Age: 29
Discharge: HOME | End: 2019-07-18
Attending: EMERGENCY MEDICINE
Payer: COMMERCIAL

## 2019-07-18 ENCOUNTER — APPOINTMENT (OUTPATIENT)
Dept: CT IMAGING | Age: 29
End: 2019-07-18
Payer: COMMERCIAL

## 2019-07-18 VITALS
HEART RATE: 67 BPM | WEIGHT: 177.1 LBS | RESPIRATION RATE: 15 BRPM | TEMPERATURE: 98.5 F | DIASTOLIC BLOOD PRESSURE: 88 MMHG | SYSTOLIC BLOOD PRESSURE: 127 MMHG | HEIGHT: 72 IN | OXYGEN SATURATION: 97 % | BODY MASS INDEX: 23.99 KG/M2

## 2019-07-18 DIAGNOSIS — R11.2 NAUSEA VOMITING AND DIARRHEA: Primary | ICD-10-CM

## 2019-07-18 DIAGNOSIS — R19.7 NAUSEA VOMITING AND DIARRHEA: Primary | ICD-10-CM

## 2019-07-18 DIAGNOSIS — R10.84 GENERALIZED ABDOMINAL PAIN: ICD-10-CM

## 2019-07-18 DIAGNOSIS — E86.0 DEHYDRATION: ICD-10-CM

## 2019-07-18 LAB
A/G RATIO: 1.3 (ref 1.1–2.2)
ALBUMIN SERPL-MCNC: 4.2 G/DL (ref 3.4–5)
ALP BLD-CCNC: 59 U/L (ref 40–129)
ALT SERPL-CCNC: 18 U/L (ref 10–40)
AMORPHOUS: ABNORMAL /HPF
ANION GAP SERPL CALCULATED.3IONS-SCNC: 11 MMOL/L (ref 3–16)
AST SERPL-CCNC: 21 U/L (ref 15–37)
BACTERIA: ABNORMAL /HPF
BASOPHILS ABSOLUTE: 0.1 K/UL (ref 0–0.2)
BASOPHILS RELATIVE PERCENT: 1.9 %
BILIRUB SERPL-MCNC: 0.5 MG/DL (ref 0–1)
BILIRUBIN URINE: ABNORMAL
BLOOD, URINE: NEGATIVE
BUN BLDV-MCNC: 17 MG/DL (ref 7–20)
CALCIUM SERPL-MCNC: 9 MG/DL (ref 8.3–10.6)
CHLORIDE BLD-SCNC: 105 MMOL/L (ref 99–110)
CLARITY: CLEAR
CO2: 24 MMOL/L (ref 21–32)
COLOR: YELLOW
CREAT SERPL-MCNC: 0.9 MG/DL (ref 0.9–1.3)
EOSINOPHILS ABSOLUTE: 0.1 K/UL (ref 0–0.6)
EOSINOPHILS RELATIVE PERCENT: 1.4 %
EPITHELIAL CELLS, UA: ABNORMAL /HPF
GFR AFRICAN AMERICAN: >60
GFR NON-AFRICAN AMERICAN: >60
GLOBULIN: 3.3 G/DL
GLUCOSE BLD-MCNC: 97 MG/DL (ref 70–99)
GLUCOSE URINE: NEGATIVE MG/DL
HCT VFR BLD CALC: 47.1 % (ref 40.5–52.5)
HEMOGLOBIN: 15.9 G/DL (ref 13.5–17.5)
KETONES, URINE: NEGATIVE MG/DL
LACTIC ACID: 0.9 MMOL/L (ref 0.4–2)
LEUKOCYTE ESTERASE, URINE: NEGATIVE
LIPASE: 34 U/L (ref 13–60)
LYMPHOCYTES ABSOLUTE: 1.1 K/UL (ref 1–5.1)
LYMPHOCYTES RELATIVE PERCENT: 17.5 %
MCH RBC QN AUTO: 29.4 PG (ref 26–34)
MCHC RBC AUTO-ENTMCNC: 33.7 G/DL (ref 31–36)
MCV RBC AUTO: 87.4 FL (ref 80–100)
MICROSCOPIC EXAMINATION: YES
MONOCYTES ABSOLUTE: 0.5 K/UL (ref 0–1.3)
MONOCYTES RELATIVE PERCENT: 7.2 %
MUCUS: ABNORMAL /LPF
NEUTROPHILS ABSOLUTE: 4.7 K/UL (ref 1.7–7.7)
NEUTROPHILS RELATIVE PERCENT: 72 %
NITRITE, URINE: NEGATIVE
PDW BLD-RTO: 13.7 % (ref 12.4–15.4)
PH UA: 6 (ref 5–8)
PLATELET # BLD: 185 K/UL (ref 135–450)
PMV BLD AUTO: 9.1 FL (ref 5–10.5)
POTASSIUM REFLEX MAGNESIUM: 3.8 MMOL/L (ref 3.5–5.1)
PROTEIN UA: ABNORMAL MG/DL
RBC # BLD: 5.39 M/UL (ref 4.2–5.9)
RBC UA: ABNORMAL /HPF (ref 0–2)
SODIUM BLD-SCNC: 140 MMOL/L (ref 136–145)
SPECIFIC GRAVITY UA: >=1.03 (ref 1–1.03)
TOTAL PROTEIN: 7.5 G/DL (ref 6.4–8.2)
URINE TYPE: ABNORMAL
UROBILINOGEN, URINE: 0.2 E.U./DL
WBC # BLD: 6.5 K/UL (ref 4–11)
WBC UA: ABNORMAL /HPF (ref 0–5)

## 2019-07-18 PROCEDURE — 99284 EMERGENCY DEPT VISIT MOD MDM: CPT

## 2019-07-18 PROCEDURE — 36415 COLL VENOUS BLD VENIPUNCTURE: CPT

## 2019-07-18 PROCEDURE — 81001 URINALYSIS AUTO W/SCOPE: CPT

## 2019-07-18 PROCEDURE — 83605 ASSAY OF LACTIC ACID: CPT

## 2019-07-18 PROCEDURE — 6360000002 HC RX W HCPCS: Performed by: EMERGENCY MEDICINE

## 2019-07-18 PROCEDURE — 96374 THER/PROPH/DIAG INJ IV PUSH: CPT

## 2019-07-18 PROCEDURE — 2580000003 HC RX 258: Performed by: EMERGENCY MEDICINE

## 2019-07-18 PROCEDURE — 6360000004 HC RX CONTRAST MEDICATION: Performed by: EMERGENCY MEDICINE

## 2019-07-18 PROCEDURE — 80053 COMPREHEN METABOLIC PANEL: CPT

## 2019-07-18 PROCEDURE — 74177 CT ABD & PELVIS W/CONTRAST: CPT

## 2019-07-18 PROCEDURE — 83690 ASSAY OF LIPASE: CPT

## 2019-07-18 PROCEDURE — 6370000000 HC RX 637 (ALT 250 FOR IP): Performed by: EMERGENCY MEDICINE

## 2019-07-18 PROCEDURE — 96361 HYDRATE IV INFUSION ADD-ON: CPT

## 2019-07-18 PROCEDURE — 85025 COMPLETE CBC W/AUTO DIFF WBC: CPT

## 2019-07-18 RX ORDER — ONDANSETRON 4 MG/1
4 TABLET, ORALLY DISINTEGRATING ORAL EVERY 8 HOURS PRN
Qty: 15 TABLET | Refills: 0 | Status: SHIPPED | OUTPATIENT
Start: 2019-07-18 | End: 2019-09-27 | Stop reason: ALTCHOICE

## 2019-07-18 RX ORDER — DIPHENOXYLATE HYDROCHLORIDE AND ATROPINE SULFATE 2.5; .025 MG/1; MG/1
1 TABLET ORAL ONCE
Status: COMPLETED | OUTPATIENT
Start: 2019-07-18 | End: 2019-07-18

## 2019-07-18 RX ORDER — ONDANSETRON 2 MG/ML
4 INJECTION INTRAMUSCULAR; INTRAVENOUS ONCE
Status: COMPLETED | OUTPATIENT
Start: 2019-07-18 | End: 2019-07-18

## 2019-07-18 RX ORDER — 0.9 % SODIUM CHLORIDE 0.9 %
1000 INTRAVENOUS SOLUTION INTRAVENOUS ONCE
Status: COMPLETED | OUTPATIENT
Start: 2019-07-18 | End: 2019-07-18

## 2019-07-18 RX ADMIN — DIPHENOXYLATE HYDROCHLORIDE AND ATROPINE SULFATE 1 TABLET: 2.5; .025 TABLET ORAL at 18:41

## 2019-07-18 RX ADMIN — SODIUM CHLORIDE 1000 ML: 9 INJECTION, SOLUTION INTRAVENOUS at 18:42

## 2019-07-18 RX ADMIN — IOPAMIDOL 80 ML: 755 INJECTION, SOLUTION INTRAVENOUS at 19:30

## 2019-07-18 RX ADMIN — ONDANSETRON 4 MG: 2 INJECTION INTRAMUSCULAR; INTRAVENOUS at 18:41

## 2019-07-18 ASSESSMENT — PAIN DESCRIPTION - PAIN TYPE: TYPE: ACUTE PAIN

## 2019-07-18 ASSESSMENT — PAIN SCALES - GENERAL: PAINLEVEL_OUTOF10: 5

## 2019-07-18 ASSESSMENT — PAIN DESCRIPTION - DESCRIPTORS: DESCRIPTORS: STABBING

## 2019-07-18 ASSESSMENT — PAIN DESCRIPTION - LOCATION: LOCATION: CHEST;RIB CAGE

## 2019-07-18 ASSESSMENT — PAIN DESCRIPTION - FREQUENCY: FREQUENCY: INTERMITTENT

## 2019-07-18 NOTE — ED PROVIDER NOTES
Normal diameter, nonobstructed. Appendix without thickening or enlargement. LYMPH NODES: No abnormally enlarged nodes. PERITONEUM/RETROPERITONEUM: No ascites or free air. VESSELS: Aorta and IVC without significant abnormality. Splenic vein, SMV, PV and hepatic veins demonstrate enhancement. ABDOMINAL WALL: Normal. BONES: No significant abnormality. OTHER FINDINGS: None. 1. No acute intra-abdominopelvic abnormality. Patient with 4 days of fever, abdominal pain, vomiting and diarrhea. No localized tenderness on abdominal exam. No leukocytosis or left shift. No signs of significant dehydration other than urine concentration but no ketonuria. No significant electrolyte disturbance. CT abdomen without obvious colitis or enteritis. No obstruction, abscess, perforation, diverticulitis. Or appendicitis. Now tolerating PO fluids with no vomiting. Will have patient send stool sample in for analysis if diarrhea persists, he was not able to provide sample while in ED      I estimate there is LOW risk for ACUTE APPENDICITIS, BOWEL OBSTRUCTION, CHOLECYSTITIS, COMPLICATED DIVERTICULITIS, INCARCERATED HERNIA, PANCREATITIS,  PERFORATED BOWEL or ULCER, ISCHEMIC BOWEL, ABDOMINAL AORTIC ANEURYSM, AORTIC DISSECTION, thus I consider the discharge disposition reasonable. Also, there is no evidence or peritonitis, sepsis, or toxicity. Northwood Flatness and I have discussed the diagnosis and risks, and we agree with discharging home to follow-up with their primary doctor. We also discussed returning to the Emergency Department immediately if new or worsening symptoms occur.     IMPRESSION  Abdominal pain  Vomiting and diarrhea     Kristen Pino MD  07/19/19 5577

## 2019-07-18 NOTE — ED PROVIDER NOTES
nightly    VALACYCLOVIR (VALTREX) 1 G TABLET    Take 2 tablets by mouth daily   Modified Medications    No medications on file   Discontinued Medications    No medications on file        SURGICAL HISTORY:       Procedure Laterality Date    CHEST TUBE INSERTION          FAMILY HISTORY:       Problem Relation Age of Onset    Diabetes Father     Heart Attack Maternal Grandmother     Heart Disease Maternal Grandmother     Prostate Cancer Maternal Grandfather     Cancer Paternal Grandfather     Prostate Cancer Paternal Grandfather         SOCIAL HISTORY:    reports that he quit smoking about 5 years ago. His smoking use included cigarettes. He has a 10.00 pack-year smoking history. He has never used smokeless tobacco. He reports that he does not drink alcohol or use drugs. ALLERGIES:   Allergies   Allergen Reactions    Cyclobenzaprine Other (See Comments)    Diclofenac Shortness Of Breath    Sulfamethoxazole-Trimethoprim Shortness Of Breath    Sulfa Antibiotics        PHYSICAL EXAM:  VITAL SIGNS: /88   Pulse 67   Temp 98.5 °F (36.9 °C) (Oral)   Resp 15   Ht 6' (1.829 m)   Wt 80.3 kg (177 lb 1.6 oz)   SpO2 97%   BMI 24.02 kg/m²   Constitutional:  No acute distress, Non-toxic appearance  HENT: Normocephalic, Atraumatic Oropharynx moist, No oral exudates. TMs are normal.  Eyes:  PERRL, EOMI, Conjunctiva normal, No discharge. Neck: No tenderness, Supple, No lymphadenopathy, No stridor. Cardiovascular:  Normal heart rate, Normal rhythm, No murmurs, No rubs, No gallops. Pulmonary/Chest:  Normal breath sounds, No respiratory distress, No wheezing,  Abdomen:   Soft, bowel sounds are normal.  Soft with some mild diffuse tenderness.   No masses, No pulsatile masses  Back:  No tenderness, No CVA tenderness  Extremities:  Normal range of motion, Intact distal pulses, No edema, No tenderness  Neurologic:  Alert & oriented x 3, Speech is clear and appropriate, No upper extremity drift or lower extremity weakness,  Normal sensory function, No facial asymmetry, no truncal or extremity ataxia. Normal gait. Skin:  Warm, Dry, No erythema, No rash  Psychiatric:  Affect normal, Mood normal      EKG:    EKG interpreted by myself. Radiology:      LAB      ED COURSE & MEDICAL DECISION MAKING:  Pertinent Labs & Imaging studies reviewed. (See chart for details)  58-year-old male with 4-day history of nausea vomiting and diarrhea. He has had some myalgias and reported fever between 102 and 104. Currently afebrile. Denies UTI symptoms. He states he has a mild cough productive of clear sputum. No chest pain or shortness of breath. He is afebrile with normal vital signs. Recheck temperature was normal.  Lungs are clear. Bowel sounds are normal.  Mild diffuse abdominal tenderness to palpation. IV fluids were started. Patient was given Zofran IV and Lomotil by mouth. CBC, CMP and lipase were normal.   The patient states he feels a little better after Zofran and Lomotil. He has not had vomiting or diarrhea here. On repeat exam, the patient still has some diffuse abdominal tenderness especially in the left upper quadrant. Urinalysis is still pending. CT scan of the abdomen and pelvis with IV contrast was ordered and the study is pending. I have signed out [address]  Emergency Department care to my colleague, Dr. Kaylene Ching at 1577. We discussed the pertinent history, physical exam, completed/pending test results (if applicable) and current treatment plan. Please refer to his/her chart for the patient's remaining emergency department course and final disposition.       (Please note that portions of this note may have been completed with a voice recognition program.  Efforts were made to edit the dictation but occasionally words are mis-transcribed)      FINAL IMPRESSION:  1 --nausea vomiting and diarrhea  2 --generalized abdominal pain                Rk Mccartney MD  07/18/19 [id number] [place] [address]

## 2019-07-22 ENCOUNTER — TELEPHONE (OUTPATIENT)
Dept: FAMILY MEDICINE CLINIC | Age: 29
End: 2019-07-22

## 2019-09-03 DIAGNOSIS — Z00.00 HEALTHCARE MAINTENANCE: ICD-10-CM

## 2019-09-05 RX ORDER — CLOBETASOL PROPIONATE 0.5 MG/ML
1 LOTION TOPICAL 2 TIMES DAILY
Qty: 118 ML | Refills: 2 | Status: SHIPPED | OUTPATIENT
Start: 2019-09-05 | End: 2019-09-27 | Stop reason: ALTCHOICE

## 2019-09-10 ENCOUNTER — OFFICE VISIT (OUTPATIENT)
Dept: FAMILY MEDICINE CLINIC | Age: 29
End: 2019-09-10
Payer: COMMERCIAL

## 2019-09-10 VITALS
SYSTOLIC BLOOD PRESSURE: 122 MMHG | WEIGHT: 191.4 LBS | BODY MASS INDEX: 25.96 KG/M2 | DIASTOLIC BLOOD PRESSURE: 88 MMHG | HEART RATE: 58 BPM | RESPIRATION RATE: 16 BRPM | OXYGEN SATURATION: 96 %

## 2019-09-10 DIAGNOSIS — G89.29 CHRONIC RIGHT-SIDED LOW BACK PAIN WITH RIGHT-SIDED SCIATICA: Primary | ICD-10-CM

## 2019-09-10 DIAGNOSIS — M54.41 CHRONIC RIGHT-SIDED LOW BACK PAIN WITH RIGHT-SIDED SCIATICA: Primary | ICD-10-CM

## 2019-09-10 PROCEDURE — 1036F TOBACCO NON-USER: CPT | Performed by: NURSE PRACTITIONER

## 2019-09-10 PROCEDURE — G8427 DOCREV CUR MEDS BY ELIG CLIN: HCPCS | Performed by: NURSE PRACTITIONER

## 2019-09-10 PROCEDURE — 99213 OFFICE O/P EST LOW 20 MIN: CPT | Performed by: NURSE PRACTITIONER

## 2019-09-10 PROCEDURE — G8419 CALC BMI OUT NRM PARAM NOF/U: HCPCS | Performed by: NURSE PRACTITIONER

## 2019-09-10 ASSESSMENT — ENCOUNTER SYMPTOMS
RESPIRATORY NEGATIVE: 1
BACK PAIN: 1

## 2019-09-11 ENCOUNTER — TELEPHONE (OUTPATIENT)
Dept: FAMILY MEDICINE CLINIC | Age: 29
End: 2019-09-11

## 2019-09-12 ENCOUNTER — TELEPHONE (OUTPATIENT)
Dept: FAMILY MEDICINE CLINIC | Age: 29
End: 2019-09-12

## 2019-09-18 RX ORDER — LAMOTRIGINE 25 MG/1
25 TABLET ORAL DAILY
Qty: 14 TABLET | Refills: 0 | Status: SHIPPED | OUTPATIENT
Start: 2019-09-18 | End: 2019-09-20 | Stop reason: SDUPTHER

## 2019-09-20 ENCOUNTER — TELEPHONE (OUTPATIENT)
Dept: FAMILY MEDICINE CLINIC | Age: 29
End: 2019-09-20

## 2019-09-20 RX ORDER — LAMOTRIGINE 25 MG/1
25 TABLET ORAL DAILY
Qty: 14 TABLET | Refills: 0 | Status: SHIPPED | OUTPATIENT
Start: 2019-09-20 | End: 2019-09-27 | Stop reason: SDUPTHER

## 2019-09-27 ENCOUNTER — OFFICE VISIT (OUTPATIENT)
Dept: FAMILY MEDICINE CLINIC | Age: 29
End: 2019-09-27
Payer: COMMERCIAL

## 2019-09-27 VITALS
DIASTOLIC BLOOD PRESSURE: 82 MMHG | BODY MASS INDEX: 25.36 KG/M2 | WEIGHT: 187 LBS | SYSTOLIC BLOOD PRESSURE: 120 MMHG | HEART RATE: 60 BPM | OXYGEN SATURATION: 95 %

## 2019-09-27 DIAGNOSIS — M79.642 BILATERAL HAND PAIN: ICD-10-CM

## 2019-09-27 DIAGNOSIS — H60.313 ACUTE DIFFUSE OTITIS EXTERNA OF BOTH EARS: ICD-10-CM

## 2019-09-27 DIAGNOSIS — M25.50 ARTHRALGIA, UNSPECIFIED JOINT: ICD-10-CM

## 2019-09-27 DIAGNOSIS — F31.4 BIPOLAR 1 DISORDER, DEPRESSED, SEVERE (HCC): Primary | ICD-10-CM

## 2019-09-27 DIAGNOSIS — M79.641 BILATERAL HAND PAIN: ICD-10-CM

## 2019-09-27 DIAGNOSIS — R20.0 BILATERAL HAND NUMBNESS: ICD-10-CM

## 2019-09-27 PROCEDURE — G8427 DOCREV CUR MEDS BY ELIG CLIN: HCPCS | Performed by: FAMILY MEDICINE

## 2019-09-27 PROCEDURE — G0444 DEPRESSION SCREEN ANNUAL: HCPCS | Performed by: FAMILY MEDICINE

## 2019-09-27 PROCEDURE — G8419 CALC BMI OUT NRM PARAM NOF/U: HCPCS | Performed by: FAMILY MEDICINE

## 2019-09-27 PROCEDURE — 99214 OFFICE O/P EST MOD 30 MIN: CPT | Performed by: FAMILY MEDICINE

## 2019-09-27 PROCEDURE — 1036F TOBACCO NON-USER: CPT | Performed by: FAMILY MEDICINE

## 2019-09-27 PROCEDURE — 4130F TOPICAL PREP RX AOE: CPT | Performed by: FAMILY MEDICINE

## 2019-09-27 PROCEDURE — 96372 THER/PROPH/DIAG INJ SC/IM: CPT | Performed by: FAMILY MEDICINE

## 2019-09-27 RX ORDER — KETOROLAC TROMETHAMINE 30 MG/ML
30 INJECTION, SOLUTION INTRAMUSCULAR; INTRAVENOUS ONCE
Status: SHIPPED | OUTPATIENT
Start: 2019-09-27 | End: 2019-10-02

## 2019-09-27 RX ORDER — CIPROFLOXACIN AND DEXAMETHASONE 3; 1 MG/ML; MG/ML
4 SUSPENSION/ DROPS AURICULAR (OTIC) 2 TIMES DAILY
Qty: 1 BOTTLE | Refills: 0 | Status: SHIPPED | OUTPATIENT
Start: 2019-09-27 | End: 2019-10-04

## 2019-09-27 RX ORDER — LAMOTRIGINE 100 MG/1
100 TABLET ORAL DAILY
Qty: 14 TABLET | Refills: 0 | Status: SHIPPED | OUTPATIENT
Start: 2019-10-06 | End: 2019-10-16 | Stop reason: SDUPTHER

## 2019-09-27 RX ORDER — KETOROLAC TROMETHAMINE 30 MG/ML
30 INJECTION, SOLUTION INTRAMUSCULAR; INTRAVENOUS ONCE
Status: COMPLETED | OUTPATIENT
Start: 2019-09-27 | End: 2019-09-27

## 2019-09-27 RX ORDER — LAMOTRIGINE 25 MG/1
25 TABLET ORAL 2 TIMES DAILY
Qty: 16 TABLET | Refills: 0 | Status: SHIPPED | OUTPATIENT
Start: 2019-09-27 | End: 2019-10-17

## 2019-09-27 RX ADMIN — KETOROLAC TROMETHAMINE 30 MG: 30 INJECTION, SOLUTION INTRAMUSCULAR; INTRAVENOUS at 16:16

## 2019-09-27 ASSESSMENT — PATIENT HEALTH QUESTIONNAIRE - PHQ9
1. LITTLE INTEREST OR PLEASURE IN DOING THINGS: 3
8. MOVING OR SPEAKING SO SLOWLY THAT OTHER PEOPLE COULD HAVE NOTICED. OR THE OPPOSITE, BEING SO FIGETY OR RESTLESS THAT YOU HAVE BEEN MOVING AROUND A LOT MORE THAN USUAL: 3
7. TROUBLE CONCENTRATING ON THINGS, SUCH AS READING THE NEWSPAPER OR WATCHING TELEVISION: 3
5. POOR APPETITE OR OVEREATING: 3
SUM OF ALL RESPONSES TO PHQ QUESTIONS 1-9: 27
9. THOUGHTS THAT YOU WOULD BE BETTER OFF DEAD, OR OF HURTING YOURSELF: 3
4. FEELING TIRED OR HAVING LITTLE ENERGY: 3
2. FEELING DOWN, DEPRESSED OR HOPELESS: 3
6. FEELING BAD ABOUT YOURSELF - OR THAT YOU ARE A FAILURE OR HAVE LET YOURSELF OR YOUR FAMILY DOWN: 3
SUM OF ALL RESPONSES TO PHQ QUESTIONS 1-9: 27
SUM OF ALL RESPONSES TO PHQ9 QUESTIONS 1 & 2: 6
3. TROUBLE FALLING OR STAYING ASLEEP: 3

## 2019-09-27 ASSESSMENT — ANXIETY QUESTIONNAIRES
2. NOT BEING ABLE TO STOP OR CONTROL WORRYING: 3-NEARLY EVERY DAY
GAD7 TOTAL SCORE: 21
3. WORRYING TOO MUCH ABOUT DIFFERENT THINGS: 3-NEARLY EVERY DAY
4. TROUBLE RELAXING: 3-NEARLY EVERY DAY
1. FEELING NERVOUS, ANXIOUS, OR ON EDGE: 3-NEARLY EVERY DAY
5. BEING SO RESTLESS THAT IT IS HARD TO SIT STILL: 3-NEARLY EVERY DAY
6. BECOMING EASILY ANNOYED OR IRRITABLE: 3-NEARLY EVERY DAY
7. FEELING AFRAID AS IF SOMETHING AWFUL MIGHT HAPPEN: 3-NEARLY EVERY DAY

## 2019-09-27 ASSESSMENT — ENCOUNTER SYMPTOMS
BACK PAIN: 1
VOMITING: 0
NAUSEA: 1
SHORTNESS OF BREATH: 0
CHEST TIGHTNESS: 0
COLOR CHANGE: 1
ABDOMINAL PAIN: 0

## 2019-09-27 NOTE — PROGRESS NOTES
Comment: occasionally    Drug use: No     Comment: past history of heroin use    Sexual activity: Yes     Partners: Female   Lifestyle    Physical activity:     Days per week: Not on file     Minutes per session: Not on file    Stress: Not on file   Relationships    Social connections:     Talks on phone: Not on file     Gets together: Not on file     Attends [employer] service: Not on file     Active member of club or organization: Not on file     Attends meetings of clubs or organizations: Not on file     Relationship status: Not on file    Intimate partner violence:     Fear of current or ex partner: Not on file     Emotionally abused: Not on file     Physically abused: Not on file     Forced sexual activity: Not on file   Other Topics Concern    Not on file   Social History Narrative    Works as a  at [employer]    Has a 3year old girl    Good relationship with GF who is a        Family History   Problem Relation Age of Onset    Diabetes Father     Heart Attack Maternal Grandmother     Heart Disease Maternal Grandmother     Prostate Cancer Maternal Grandfather     Cancer Paternal Rk Spillers Prostate Cancer Paternal Grandfather        Current Outpatient Medications   Medication Sig Dispense Refill    ciprofloxacin-dexamethasone (CIPRODEX) 0.3-0.1 % otic suspension Place 4 drops into both ears 2 times daily for 7 days 1 Bottle 0    lamoTRIgine (LAMICTAL) 25 MG tablet Take 1 tablet by mouth 2 times daily for 8 days 16 tablet 0    [START ON 10/6/2019] lamoTRIgine (LAMICTAL) 100 MG tablet Take 1 tablet by mouth daily for 14 days 14 tablet 0    valACYclovir (VALTREX) 1 g tablet Take 2 tablets by mouth daily 30 tablet 2     Current Facility-Administered Medications   Medication Dose Route Frequency Provider Last Rate Last Dose    ketorolac (TORADOL) injection 30 mg  30 mg Intravenous Once Gloria Peña MD           Immunization History   Administered Date(s) Administered   Mili Joseph

## 2019-10-15 ENCOUNTER — TELEPHONE (OUTPATIENT)
Dept: FAMILY MEDICINE CLINIC | Age: 29
End: 2019-10-15

## 2019-10-16 DIAGNOSIS — F31.4 BIPOLAR 1 DISORDER, DEPRESSED, SEVERE (HCC): ICD-10-CM

## 2019-10-16 RX ORDER — LAMOTRIGINE 100 MG/1
100 TABLET ORAL DAILY
Qty: 14 TABLET | Refills: 0 | Status: SHIPPED | OUTPATIENT
Start: 2019-10-16 | End: 2020-01-30 | Stop reason: ALTCHOICE

## 2019-10-17 RX ORDER — LAMOTRIGINE 100 MG/1
100 TABLET ORAL 2 TIMES DAILY
Qty: 30 TABLET | Refills: 3 | Status: SHIPPED | OUTPATIENT
Start: 2019-10-17 | End: 2020-01-30 | Stop reason: DRUGHIGH

## 2019-10-23 ENCOUNTER — TELEPHONE (OUTPATIENT)
Dept: FAMILY MEDICINE CLINIC | Age: 29
End: 2019-10-23

## 2019-11-12 ENCOUNTER — TELEPHONE (OUTPATIENT)
Dept: FAMILY MEDICINE CLINIC | Age: 29
End: 2019-11-12

## 2019-11-12 DIAGNOSIS — L40.0 PLAQUE PSORIASIS: Primary | ICD-10-CM

## 2019-11-13 ENCOUNTER — TELEPHONE (OUTPATIENT)
Dept: FAMILY MEDICINE CLINIC | Age: 29
End: 2019-11-13

## 2019-11-13 RX ORDER — CLOBETASOL PROPIONATE 0.5 MG/ML
1 LOTION TOPICAL 2 TIMES DAILY
Qty: 118 ML | Refills: 5 | Status: SHIPPED | OUTPATIENT
Start: 2019-11-13 | End: 2020-02-24 | Stop reason: SDUPTHER

## 2019-11-13 RX ORDER — LAMOTRIGINE 25 MG/1
75 TABLET ORAL DAILY
Qty: 90 TABLET | Refills: 2 | Status: SHIPPED | OUTPATIENT
Start: 2019-11-13 | End: 2020-04-23

## 2019-12-06 ENCOUNTER — TELEPHONE (OUTPATIENT)
Dept: FAMILY MEDICINE CLINIC | Age: 29
End: 2019-12-06

## 2019-12-13 ENCOUNTER — TELEPHONE (OUTPATIENT)
Dept: FAMILY MEDICINE CLINIC | Age: 29
End: 2019-12-13

## 2019-12-23 ENCOUNTER — TELEPHONE (OUTPATIENT)
Dept: FAMILY MEDICINE CLINIC | Age: 29
End: 2019-12-23

## 2019-12-23 NOTE — TELEPHONE ENCOUNTER
I spoke to pt's wife, she will try to reach pt and talk to him about seeing another provider in the office this week.       Feelings/thoughts are more passive, patient himself and wife are VERY aware to call 911 if he is truly suicidal.

## 2019-12-23 NOTE — TELEPHONE ENCOUNTER
LM for pt to [facility]    Telephone Information:   Mobile [phone]

## 2019-12-23 NOTE — TELEPHONE ENCOUNTER
Spoke to pt's wife, Dion Pina, per BARRY. Pt at work and is working 3rd shift as well tonight. He has been telling her, \"I just feel hopeless, I feel suicidal.\" Dion Pina states he has an episode similar to this every year around this time of year but seems to be worse this year. She states he said, \"I am tired of feeling so depressed and hopeless. \" She states he does talk like this but does not have a plan and states he will not act on this. Dion Pina is aware if his ideations change, feels like he really is suicidal, or anything out of the ordinary- to take to ED or call 911 immediately. Dion Pina asking Dr. Martha Galeano for an appt to discuss medication- feel as the Lamictal is not help pt's depression. He currently takes 75mg QD. Dion Pina asking if Dr. Martha Galeano thinks he can see anyone else this Thursday or Friday or if she wants to see him when she returns to office 1/2 or 1/3. Dr. Martha Galeano, please advise. Dion Pina #[phone]    Also attempted to call Kevin Jesus to discuss from pt view- LM for him to CB.    Telephone Information:   Mobile [phone]

## 2019-12-26 NOTE — TELEPHONE ENCOUNTER
I LM for pt to call me back regarding Monday's notes. See below.     Telephone Information:   Mobile [phone]

## 2020-01-02 NOTE — TELEPHONE ENCOUNTER
Please schedule him with me (or anyone) ASAP. Can use a provider fill. In the meantime, he's likely not on a therapeutic dose of the lamictal yet. I would like for him to increase to 100 mg for one week. If still feeling like this, can increase to 200 mg daily for the week after. Let me know if a prescription is needed.      thanks

## 2020-01-02 NOTE — TELEPHONE ENCOUNTER
He feels that the bipolar is fine, but he his still having issue with the depression. He is doing well on the Lamictal 75 mg. I made him an appointment tomorrow.

## 2020-01-30 ENCOUNTER — OFFICE VISIT (OUTPATIENT)
Dept: FAMILY MEDICINE CLINIC | Age: 30
End: 2020-01-30
Payer: COMMERCIAL

## 2020-01-30 VITALS
BODY MASS INDEX: 27.53 KG/M2 | HEART RATE: 97 BPM | SYSTOLIC BLOOD PRESSURE: 138 MMHG | WEIGHT: 203 LBS | DIASTOLIC BLOOD PRESSURE: 82 MMHG | OXYGEN SATURATION: 97 %

## 2020-01-30 PROCEDURE — 4130F TOPICAL PREP RX AOE: CPT | Performed by: FAMILY MEDICINE

## 2020-01-30 PROCEDURE — 99214 OFFICE O/P EST MOD 30 MIN: CPT | Performed by: FAMILY MEDICINE

## 2020-01-30 PROCEDURE — 1036F TOBACCO NON-USER: CPT | Performed by: FAMILY MEDICINE

## 2020-01-30 PROCEDURE — G8484 FLU IMMUNIZE NO ADMIN: HCPCS | Performed by: FAMILY MEDICINE

## 2020-01-30 PROCEDURE — G8427 DOCREV CUR MEDS BY ELIG CLIN: HCPCS | Performed by: FAMILY MEDICINE

## 2020-01-30 PROCEDURE — G8419 CALC BMI OUT NRM PARAM NOF/U: HCPCS | Performed by: FAMILY MEDICINE

## 2020-01-30 RX ORDER — FLUTICASONE PROPIONATE 50 MCG
2 SPRAY, SUSPENSION (ML) NASAL DAILY
Qty: 1 BOTTLE | Refills: 5 | Status: SHIPPED | OUTPATIENT
Start: 2020-01-30 | End: 2020-04-23 | Stop reason: SDUPTHER

## 2020-01-30 RX ORDER — CLOTRIMAZOLE 1 G/ML
SOLUTION TOPICAL
Qty: 60 ML | Refills: 0 | Status: SHIPPED | OUTPATIENT
Start: 2020-01-30 | End: 2020-02-12 | Stop reason: SDUPTHER

## 2020-01-30 RX ORDER — NEOMYCIN SULFATE, POLYMYXIN B SULFATE AND HYDROCORTISONE 10; 3.5; 1 MG/ML; MG/ML; [USP'U]/ML
3 SUSPENSION/ DROPS AURICULAR (OTIC) 4 TIMES DAILY
Qty: 10 ML | Refills: 0 | Status: SHIPPED | OUTPATIENT
Start: 2020-01-30 | End: 2020-07-02

## 2020-01-30 RX ORDER — LAMOTRIGINE 25 MG/1
50 TABLET, CHEWABLE ORAL 2 TIMES DAILY
Qty: 120 TABLET | Refills: 0 | Status: SHIPPED | OUTPATIENT
Start: 2020-01-30 | End: 2021-04-30

## 2020-01-30 ASSESSMENT — ENCOUNTER SYMPTOMS
NAUSEA: 0
SHORTNESS OF BREATH: 0
BACK PAIN: 0
VOMITING: 0
COLOR CHANGE: 0
ABDOMINAL PAIN: 0
CHEST TIGHTNESS: 0

## 2020-01-30 NOTE — TELEPHONE ENCOUNTER
I called the pharmacy to find out if the clarification was for sig or for the qty and they stated it was not for the lamictal. That it Is for the Clotrimazole. Dr. Joshua Shafer spoke with the pharmacy to clarify.

## 2020-01-30 NOTE — TELEPHONE ENCOUNTER
Pharmacy needs clarification on rx.     Medication:    lamoTRIgine (LAMICTAL) 25 MG CHEW chew tab    Pharmacy:    [facility] [address], [address] [facility] [phone]  f [phone]

## 2020-01-30 NOTE — PATIENT INSTRUCTIONS
to help the drops get into the ear. · It's important to keep the liquid in the ear canal for 3 to 5 minutes. When should you call for help? Call your doctor now or seek immediate medical care if:    · You have a new or higher fever.     · You have new or worse pain, swelling, warmth, or redness around or behind your ear.     · You have new or increasing pus or blood draining from your ear.    Watch closely for changes in your health, and be sure to contact your doctor if:    · You are not getting better after 2 days (48 hours). Where can you learn more? Go to [email or web address]"[email or web address][email or web address]. org and sign in to your [employer] account. Enter C706 in the Spondo box to learn more about \"Swimmer's [facility]: Care Instructions. \"     If you do not have an account, please click on the \"Sign Up Now\" link. Current as of: July 28, 2019  Content Version: 12.3  © 4530-9158 [employer]wise, Incorporated. Care instructions adapted under license by [facility] ([facility]). If you have questions about a medical condition or this instruction, always ask your healthcare professional. [address] any warranty or liability for your use of this information.

## 2020-01-30 NOTE — PROGRESS NOTES
1/30/2020    This is a 30 y.o. male who presents for  Chief Complaint   Patient presents with    Depression    Manic Behavior       HPI:     Was doing well mood wise on the 100 mg daily but was having intense headaches and vomiting   Manic Episodes are less frequent, every 3-4 weeks, but more severe  Feels off the day before, dissociative   Next day: everything will piss him off  Cherelle = anger  hasn't slept for 2 days straight, will be fine after the deep depression   Was Taking 100 daily AM, then switched to PM to see if this helped, it did not    On third shift, goes back to first next week   Thought caffeine was doing it, so quit drinking that  Was eating with it to avoid AEs, didn't help    Going to NA again to \"get out of the house\"  His SO tends to nag and push buttons when it's unnecessary  She is going to counseling now  She is pushing him to try vraylar    Has had a horrible experience in the past with therapy  No current SI/HI now but can have during his deep bouts of depression    + b/l ear pain again  Has to wear headphones for work, or ear plugs  No drainage  Constant tinnitus when he feels they are infected again  No HL  + ear fullness when it happens     Past Medical History:   Diagnosis Date    Anxiety     Carpal tunnel syndrome     Depression     Headache     Pneumothorax        Past Surgical History:   Procedure Laterality Date    CHEST TUBE INSERTION         Social History     Socioeconomic History    Marital status: Single     Spouse name: Not on file    Number of children: Not on file    Years of education: Not on file    Highest education level: Not on file   Occupational History    Not on file   Social Needs    Financial resource strain: Not on file    Food insecurity:     Worry: Not on file     Inability: Not on file    Transportation needs:     Medical: Not on file     Non-medical: Not on file   Tobacco Use    Smoking status: Former Smoker     Packs/day: 1.00     Years: No current facility-administered medications for this visit. Immunization History   Administered Date(s) Administered    Tdap (Boostrix, Adacel) 03/09/2017, 10/16/2018       Allergies   Allergen Reactions    Cyclobenzaprine Other (See Comments)    Diclofenac Shortness Of Breath    Sulfamethoxazole-Trimethoprim Shortness Of Breath    Sulfa Antibiotics        Review of Systems   Constitutional: Negative for activity change, appetite change, fatigue and unexpected weight change. HENT: Positive for ear pain and tinnitus. Negative for ear discharge, hearing loss and postnasal drip. Respiratory: Negative for chest tightness and shortness of breath. Cardiovascular: Negative for chest pain and palpitations. Gastrointestinal: Negative for abdominal pain, nausea and vomiting. Musculoskeletal: Negative for arthralgias and back pain. Skin: Negative for color change. Neurological: Negative for dizziness, tremors, seizures, weakness, light-headedness, numbness and headaches. Psychiatric/Behavioral: Positive for agitation, decreased concentration, dysphoric mood and sleep disturbance. Negative for behavioral problems, confusion, hallucinations, self-injury and suicidal ideas. The patient is not nervous/anxious and is not hyperactive. /82 (Site: Left Upper Arm, Position: Sitting, Cuff Size: Medium Adult)   Pulse 97   Wt 203 lb (92.1 kg)   SpO2 97%   BMI 27.53 kg/m²     Physical Exam  Vitals signs and nursing note reviewed. Constitutional:       General: He is not in acute distress. Appearance: He is well-developed. He is not diaphoretic. HENT:      Head: Normocephalic and atraumatic. Right Ear: Tympanic membrane and external ear normal. There is no impacted cerumen. Left Ear: Tympanic membrane and external ear normal. There is no impacted cerumen.       Ears:      Comments: Diffuse moderate erythema of canals with hard pus-like material  Eyes:

## 2020-02-11 ENCOUNTER — TELEPHONE (OUTPATIENT)
Dept: FAMILY MEDICINE CLINIC | Age: 30
End: 2020-02-11

## 2020-02-12 RX ORDER — CLOTRIMAZOLE 1 G/ML
SOLUTION TOPICAL
Qty: 60 ML | Refills: 0 | Status: SHIPPED | OUTPATIENT
Start: 2020-02-12 | End: 2020-07-02

## 2020-02-12 NOTE — TELEPHONE ENCOUNTER
.  Last office visit 1/30/2020     Last written 1- 60ml with 0      Next office visit scheduled 2/14/2020    Requested Prescriptions     Pending Prescriptions Disp Refills    clotrimazole (LOTRIMIN) 1 % external solution 60 mL 0     Sig: Apply 3-4 drops 2 times daily for 10 days .

## 2020-02-14 ENCOUNTER — OFFICE VISIT (OUTPATIENT)
Dept: FAMILY MEDICINE CLINIC | Age: 30
End: 2020-02-14
Payer: COMMERCIAL

## 2020-02-14 VITALS
WEIGHT: 195 LBS | BODY MASS INDEX: 26.45 KG/M2 | HEART RATE: 96 BPM | DIASTOLIC BLOOD PRESSURE: 82 MMHG | SYSTOLIC BLOOD PRESSURE: 140 MMHG | OXYGEN SATURATION: 99 %

## 2020-02-14 PROCEDURE — G8419 CALC BMI OUT NRM PARAM NOF/U: HCPCS | Performed by: FAMILY MEDICINE

## 2020-02-14 PROCEDURE — G8484 FLU IMMUNIZE NO ADMIN: HCPCS | Performed by: FAMILY MEDICINE

## 2020-02-14 PROCEDURE — 1036F TOBACCO NON-USER: CPT | Performed by: FAMILY MEDICINE

## 2020-02-14 PROCEDURE — G8427 DOCREV CUR MEDS BY ELIG CLIN: HCPCS | Performed by: FAMILY MEDICINE

## 2020-02-14 PROCEDURE — 99214 OFFICE O/P EST MOD 30 MIN: CPT | Performed by: FAMILY MEDICINE

## 2020-02-14 ASSESSMENT — ENCOUNTER SYMPTOMS
ABDOMINAL PAIN: 0
VOMITING: 0
COUGH: 0
NAUSEA: 0
CHEST TIGHTNESS: 0
SHORTNESS OF BREATH: 0

## 2020-02-14 NOTE — LETTER
[address]  [facility] [address]  Phone: [phone]  Fax: [phone]    Felisa Torres MD        February 14, 2020      To whom it may concern,     Ralph Guaman has been a patient under my care since 11/15/2018. He has been diagnosed with Bipolar Disorder in the past prior to my care. Since I've known him, he has made valiant efforts to get his bipolar disorder under control. He has tried several medications under my care without success. Until recently with his current medication, his bipolar was not under control. He had been doing well on his current dosage of medication, and has plans to see our office Psychologist to continue to work on his improved mental health. During our visit today, we are performing a urine drug screen and testing in the urine for sexually transmitted diseases. At this stage, there would be no point in testing for Herpes infections in the blood work. There are two different types of this virus and the various types can correlate differently with various symptoms. At this time, he denies any symptoms to warrant testing, nor would it answer the question at hand.      Sincerely,        Felisa Torres MD

## 2020-02-14 NOTE — PROGRESS NOTES
2020    This is a 30 y.o. male who presents for  Chief Complaint   Patient presents with    Manic Behavior       HPI:     Feels MUCH better with the 100 mg total daily dosage  With the BID dosing, he does not have any of the headaches or nausea  No SI/HI  Moods feel more level    He is dealing with a complicated situation with his wife  He is trying to get an annulment   They have a daughter together  His wife was always accusing him of infidelity which triggered his paranoia   He found a tracking mandy on his phone that she placed  He returned home from work one night and found another man's jacket on the couch, apparently she was cheating on him  She has called his work and told his family he is back to using heroin, which is not true  At work, his boss approached him and made him go take a drug test  He is requesting another today    Due to her infidelity, he would like to be tested for STDs  He tries to avoid needles at all cost     Past Medical History:   Diagnosis Date    Anxiety     Carpal tunnel syndrome     Depression     Headache     Pneumothorax        Past Surgical History:   Procedure Laterality Date    CHEST TUBE INSERTION         Social History     Socioeconomic History    Marital status: Single     Spouse name: Not on file    Number of children: Not on file    Years of education: Not on file    Highest education level: Not on file   Occupational History    Not on file   Social Needs    Financial resource strain: Not on file    Food insecurity:     Worry: Not on file     Inability: Not on file    Transportation needs:     Medical: Not on file     Non-medical: Not on file   Tobacco Use    Smoking status: Former Smoker     Packs/day: 1.00     Years: 10.00     Pack years: 10.00     Types: Cigarettes     Last attempt to quit: 2013     Years since quittin.2    Smokeless tobacco: Never Used   Substance and Sexual Activity    Alcohol use: No     Comment: occasionally    Drug

## 2020-02-14 NOTE — Clinical Note
This patient is FINALLY coming to see you (I have been pushing this since 2018). I would like to meet and discuss his background to give you a heads up at some point. Thanks!

## 2020-02-17 LAB
C. TRACHOMATIS DNA ,URINE: NEGATIVE
N. GONORRHOEAE DNA, URINE: NEGATIVE

## 2020-02-18 LAB
REASON FOR REJECTION: NORMAL
REJECTED TEST: 5506

## 2020-02-19 ENCOUNTER — TELEPHONE (OUTPATIENT)
Dept: FAMILY MEDICINE CLINIC | Age: 30
End: 2020-02-19

## 2020-02-19 LAB
6-ACETYLMORPHINE: NOT DETECTED
7-AMINOCLONAZEPAM: NOT DETECTED
ALPHA-OH-ALPRAZOLAM: NOT DETECTED
ALPRAZOLAM: NOT DETECTED
AMPHETAMINE: NOT DETECTED
BARBITURATES: NOT DETECTED
BENZOYLECGONINE: NOT DETECTED
BUPRENORPHINE: NOT DETECTED
CARISOPRODOL: NOT DETECTED
CLONAZEPAM: NOT DETECTED
CODEINE: NOT DETECTED
CREATININE URINE: 64.6 MG/DL (ref 20–400)
DIAZEPAM: NOT DETECTED
DRUGS EXPECTED: NORMAL
EER PAIN MGT DRUG PANEL, HIGH RES/EMIT U: NORMAL
ETHYL GLUCURONIDE: NOT DETECTED
FENTANYL: NOT DETECTED
HYDROCODONE: NOT DETECTED
HYDROMORPHONE: NOT DETECTED
LORAZEPAM: NOT DETECTED
MARIJUANA METABOLITE: NOT DETECTED
MDA: NOT DETECTED
MDEA: NOT DETECTED
MDMA URINE: NOT DETECTED
MEPERIDINE: NOT DETECTED
METHADONE: NOT DETECTED
METHAMPHETAMINE: NOT DETECTED
METHYLPHENIDATE: NOT DETECTED
MIDAZOLAM: NOT DETECTED
MORPHINE: NOT DETECTED
NORBUPRENORPHINE, FREE: NOT DETECTED
NORDIAZEPAM: NOT DETECTED
NORFENTANYL: NOT DETECTED
NORHYDROCODONE, URINE: NOT DETECTED
NOROXYCODONE: NOT DETECTED
NOROXYMORPHONE, URINE: NOT DETECTED
OXAZEPAM: NOT DETECTED
OXYCODONE: NOT DETECTED
OXYMORPHONE: NOT DETECTED
PAIN MANAGEMENT DRUG PANEL: NORMAL
PAIN MANAGEMENT DRUG PANEL: NORMAL
PCP: NOT DETECTED
PHENTERMINE: NOT DETECTED
PROPOXYPHENE: NOT DETECTED
TAPENTADOL, URINE: NOT DETECTED
TAPENTADOL-O-SULFATE, URINE: NOT DETECTED
TEMAZEPAM: NOT DETECTED
TRAMADOL: NOT DETECTED
ZOLPIDEM: NOT DETECTED

## 2020-02-19 NOTE — TELEPHONE ENCOUNTER
I called the lab at [facility] and they are going to return my call. I sent multiple white vials with urine. I am asking if they can use one of them.

## 2020-02-20 ENCOUNTER — TELEPHONE (OUTPATIENT)
Dept: FAMILY MEDICINE CLINIC | Age: 30
End: 2020-02-20

## 2020-02-20 NOTE — TELEPHONE ENCOUNTER
All testing for the UDS was negative. He can stop to request/ a copy at his convenience with the front  Desk. Please let him know.  Thanks

## 2020-02-24 ENCOUNTER — TELEPHONE (OUTPATIENT)
Dept: FAMILY MEDICINE CLINIC | Age: 30
End: 2020-02-24

## 2020-02-24 RX ORDER — CLOBETASOL PROPIONATE 0.5 MG/ML
1 LOTION TOPICAL 2 TIMES DAILY
Qty: 118 ML | Refills: 5 | Status: SHIPPED | OUTPATIENT
Start: 2020-02-24 | End: 2020-03-09

## 2020-02-24 NOTE — TELEPHONE ENCOUNTER
Pt requesting refill clobetasol propionate 0.05 % LOTN lotion to [address] [place] [address] Rd, [address] [phone] - F [phone]       Also pt had a new pt appt with Dr. Amina Arana on 2/21 that he missed, states got stuck a work and forgot about the appt. Would like to reschedule. Please advise if ok.

## 2020-02-25 ENCOUNTER — TELEPHONE (OUTPATIENT)
Dept: FAMILY MEDICINE CLINIC | Age: 30
End: 2020-02-25

## 2020-02-25 NOTE — LETTER
[address]  [facility] [address]  Phone: [phone]  Fax: [phone]    Italo Martinez MD        February 26, 2020      To whom is may concern:    Mars Morris is currently a patient under my care. He established with me in 11/18. Since, I have seen him extensively for multiple office visits. When he established with me, we noted his long standing history of mood changes. He has trialed many medications in the past when he was younger prior to my care, that we discussed during the initial visit, that are usually used for Bipolar disorder. The exact reason for use if unknown, as I did not prescribe those medications. Under my care, he was formally diagnosed with Bipolar I disorder on 6/28/2019, but I highly suspect it had been longstanding and undiagnosed prior to that. During that visit, he was started on a different medication. Since, we have been trying to titrate his dose to the therapeutic range with recent success. If you have any further questions, please call my office.      Sincerely,        Italo Martinez MD

## 2020-02-26 NOTE — TELEPHONE ENCOUNTER
Called and left VM for him to call back, just want to let him know his letter for court has been rewritten and is ready for pickup or to be faxed.

## 2020-02-26 NOTE — TELEPHONE ENCOUNTER
When I entered in this key# it pulled up Chelsie Oseguera. Chelsie Oseguera Darnell: [id number]. And I did do a PA for Chelsie Oseguera. Please advise. Thank you.

## 2020-03-06 ENCOUNTER — OFFICE VISIT (OUTPATIENT)
Dept: PSYCHOLOGY | Age: 30
End: 2020-03-06
Payer: COMMERCIAL

## 2020-03-06 PROCEDURE — 90791 PSYCH DIAGNOSTIC EVALUATION: CPT | Performed by: PSYCHOLOGIST

## 2020-03-06 ASSESSMENT — PATIENT HEALTH QUESTIONNAIRE - PHQ9
8. MOVING OR SPEAKING SO SLOWLY THAT OTHER PEOPLE COULD HAVE NOTICED. OR THE OPPOSITE, BEING SO FIGETY OR RESTLESS THAT YOU HAVE BEEN MOVING AROUND A LOT MORE THAN USUAL: 3
6. FEELING BAD ABOUT YOURSELF - OR THAT YOU ARE A FAILURE OR HAVE LET YOURSELF OR YOUR FAMILY DOWN: 3
SUM OF ALL RESPONSES TO PHQ QUESTIONS 1-9: 22
4. FEELING TIRED OR HAVING LITTLE ENERGY: 2
7. TROUBLE CONCENTRATING ON THINGS, SUCH AS READING THE NEWSPAPER OR WATCHING TELEVISION: 3
9. THOUGHTS THAT YOU WOULD BE BETTER OFF DEAD, OR OF HURTING YOURSELF: 1
5. POOR APPETITE OR OVEREATING: 3
SUM OF ALL RESPONSES TO PHQ QUESTIONS 1-9: 22
3. TROUBLE FALLING OR STAYING ASLEEP: 3
1. LITTLE INTEREST OR PLEASURE IN DOING THINGS: 2
SUM OF ALL RESPONSES TO PHQ9 QUESTIONS 1 & 2: 4
2. FEELING DOWN, DEPRESSED OR HOPELESS: 2

## 2020-03-06 ASSESSMENT — ANXIETY QUESTIONNAIRES
4. TROUBLE RELAXING: 3-NEARLY EVERY DAY
1. FEELING NERVOUS, ANXIOUS, OR ON EDGE: 3-NEARLY EVERY DAY
7. FEELING AFRAID AS IF SOMETHING AWFUL MIGHT HAPPEN: 1-SEVERAL DAYS
6. BECOMING EASILY ANNOYED OR IRRITABLE: 3-NEARLY EVERY DAY
5. BEING SO RESTLESS THAT IT IS HARD TO SIT STILL: 1-SEVERAL DAYS
GAD7 TOTAL SCORE: 17
2. NOT BEING ABLE TO STOP OR CONTROL WORRYING: 3-NEARLY EVERY DAY
3. WORRYING TOO MUCH ABOUT DIFFERENT THINGS: 3-NEARLY EVERY DAY
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
## 2020-03-20 ENCOUNTER — TELEPHONE (OUTPATIENT)
Dept: FAMILY MEDICINE CLINIC | Age: 30
End: 2020-03-20

## 2020-03-25 RX ORDER — ALBUTEROL SULFATE 90 UG/1
2 AEROSOL, METERED RESPIRATORY (INHALATION) 4 TIMES DAILY PRN
Qty: 3 INHALER | Refills: 1 | Status: SHIPPED | OUTPATIENT
Start: 2020-03-25 | End: 2020-04-23 | Stop reason: SDUPTHER

## 2020-04-01 ENCOUNTER — TELEPHONE (OUTPATIENT)
Dept: FAMILY MEDICINE CLINIC | Age: 30
End: 2020-04-01

## 2020-04-01 NOTE — TELEPHONE ENCOUNTER
He is supposed to be on lamictal and during our last visit he said he was doing well on this. .. Can someone clarify?  Thanks

## 2020-04-10 ENCOUNTER — TELEPHONE (OUTPATIENT)
Dept: FAMILY MEDICINE CLINIC | Age: 30
End: 2020-04-10

## 2020-04-10 RX ORDER — LAMOTRIGINE 150 MG/1
75 TABLET ORAL 2 TIMES DAILY
Qty: 30 TABLET | Refills: 3 | Status: SHIPPED | OUTPATIENT
Start: 2020-04-10 | End: 2020-04-23 | Stop reason: SDUPTHER

## 2020-04-10 RX ORDER — HYDROXYZINE HYDROCHLORIDE 25 MG/1
25 TABLET, FILM COATED ORAL NIGHTLY
Qty: 30 TABLET | Refills: 0 | Status: SHIPPED | OUTPATIENT
Start: 2020-04-10 | End: 2020-04-23 | Stop reason: SDUPTHER

## 2020-04-11 ENCOUNTER — TELEPHONE (OUTPATIENT)
Dept: FAMILY MEDICINE CLINIC | Age: 30
End: 2020-04-11
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
## 2020-04-23 RX ORDER — VALACYCLOVIR HYDROCHLORIDE 1 G/1
2000 TABLET, FILM COATED ORAL DAILY
Qty: 180 TABLET | Refills: 1 | Status: SHIPPED | OUTPATIENT
Start: 2020-04-23 | End: 2022-11-01 | Stop reason: SDUPTHER

## 2020-04-23 RX ORDER — HYDROXYZINE HYDROCHLORIDE 25 MG/1
25 TABLET, FILM COATED ORAL NIGHTLY
Qty: 90 TABLET | Refills: 1 | Status: SHIPPED | OUTPATIENT
Start: 2020-04-23 | End: 2020-07-02

## 2020-04-23 RX ORDER — ALBUTEROL SULFATE 90 UG/1
2 AEROSOL, METERED RESPIRATORY (INHALATION) 4 TIMES DAILY PRN
Qty: 3 INHALER | Refills: 3 | Status: SHIPPED | OUTPATIENT
Start: 2020-04-23 | End: 2020-10-05 | Stop reason: SDUPTHER

## 2020-04-23 RX ORDER — LAMOTRIGINE 150 MG/1
75 TABLET ORAL 2 TIMES DAILY
Qty: 90 TABLET | Refills: 1 | Status: SHIPPED | OUTPATIENT
Start: 2020-04-23 | End: 2021-04-30

## 2020-04-23 RX ORDER — CLOTRIMAZOLE 1 G/ML
SOLUTION TOPICAL
Qty: 180 ML | Refills: 1 | OUTPATIENT
Start: 2020-04-23

## 2020-04-23 RX ORDER — FLUTICASONE PROPIONATE 50 MCG
2 SPRAY, SUSPENSION (ML) NASAL DAILY
Qty: 3 BOTTLE | Refills: 1 | Status: SHIPPED | OUTPATIENT
Start: 2020-04-23 | End: 2021-04-30

## 2020-04-23 RX ORDER — CLOBETASOL PROPIONATE 0.5 MG/ML
1 LOTION TOPICAL 2 TIMES DAILY
Qty: 236 ML | Refills: 1 | Status: SHIPPED | OUTPATIENT
Start: 2020-04-23 | End: 2021-06-25 | Stop reason: SDUPTHER

## 2020-04-23 RX ORDER — LAMOTRIGINE 25 MG/1
75 TABLET ORAL 2 TIMES DAILY
Qty: 540 TABLET | Refills: 1 | OUTPATIENT
Start: 2020-04-23 | End: 2020-07-22

## 2020-04-23 NOTE — TELEPHONE ENCOUNTER
Patient requesting a medication refill. Medication:   Patient needs all medications filled. Patient recently laid off and insurance ends in a week. Patient wants to know if he can have 90 day supplies. Patient also wanted to include the lotion in this. Please advise patient.  [phone]    Pharmacy:   [facility] [address], [address] [facility] [phone] Shaina Carl [phone]      Last office visit: 04/13/2020  Next office visit: Visit date not found

## 2020-06-09 ENCOUNTER — APPOINTMENT (OUTPATIENT)
Dept: GENERAL RADIOLOGY | Age: 30
End: 2020-06-09

## 2020-06-09 ENCOUNTER — HOSPITAL ENCOUNTER (EMERGENCY)
Age: 30
Discharge: HOME | End: 2020-06-09

## 2020-06-09 VITALS
OXYGEN SATURATION: 99 % | TEMPERATURE: 98.2 F | HEART RATE: 65 BPM | DIASTOLIC BLOOD PRESSURE: 93 MMHG | SYSTOLIC BLOOD PRESSURE: 126 MMHG | BODY MASS INDEX: 26.45 KG/M2 | RESPIRATION RATE: 16 BRPM | WEIGHT: 195 LBS

## 2020-06-09 LAB
A/G RATIO: 1.4 (ref 1.1–2.2)
ALBUMIN SERPL-MCNC: 4.5 G/DL (ref 3.4–5)
ALP BLD-CCNC: 68 U/L (ref 40–129)
ALT SERPL-CCNC: 19 U/L (ref 10–40)
ANION GAP SERPL CALCULATED.3IONS-SCNC: 10 MMOL/L (ref 3–16)
AST SERPL-CCNC: 20 U/L (ref 15–37)
BASOPHILS ABSOLUTE: 0.1 K/UL (ref 0–0.2)
BASOPHILS RELATIVE PERCENT: 1 %
BILIRUB SERPL-MCNC: 0.4 MG/DL (ref 0–1)
BUN BLDV-MCNC: 14 MG/DL (ref 7–20)
CALCIUM SERPL-MCNC: 9.4 MG/DL (ref 8.3–10.6)
CHLORIDE BLD-SCNC: 103 MMOL/L (ref 99–110)
CO2: 25 MMOL/L (ref 21–32)
CREAT SERPL-MCNC: 1 MG/DL (ref 0.9–1.3)
EOSINOPHILS ABSOLUTE: 0.1 K/UL (ref 0–0.6)
EOSINOPHILS RELATIVE PERCENT: 1.7 %
GFR AFRICAN AMERICAN: >60
GFR NON-AFRICAN AMERICAN: >60
GLOBULIN: 3.2 G/DL
GLUCOSE BLD-MCNC: 94 MG/DL (ref 70–99)
HCT VFR BLD CALC: 47.6 % (ref 40.5–52.5)
HEMOGLOBIN: 16.6 G/DL (ref 13.5–17.5)
LYMPHOCYTES ABSOLUTE: 2.6 K/UL (ref 1–5.1)
LYMPHOCYTES RELATIVE PERCENT: 34.3 %
MCH RBC QN AUTO: 31.2 PG (ref 26–34)
MCHC RBC AUTO-ENTMCNC: 34.8 G/DL (ref 31–36)
MCV RBC AUTO: 89.7 FL (ref 80–100)
MONOCYTES ABSOLUTE: 0.5 K/UL (ref 0–1.3)
MONOCYTES RELATIVE PERCENT: 7.1 %
NEUTROPHILS ABSOLUTE: 4.2 K/UL (ref 1.7–7.7)
NEUTROPHILS RELATIVE PERCENT: 55.9 %
PDW BLD-RTO: 14.2 % (ref 12.4–15.4)
PLATELET # BLD: 230 K/UL (ref 135–450)
PMV BLD AUTO: 9 FL (ref 5–10.5)
POTASSIUM SERPL-SCNC: 4.4 MMOL/L (ref 3.5–5.1)
RBC # BLD: 5.31 M/UL (ref 4.2–5.9)
SODIUM BLD-SCNC: 138 MMOL/L (ref 136–145)
TOTAL PROTEIN: 7.7 G/DL (ref 6.4–8.2)
TROPONIN: <0.01 NG/ML
WBC # BLD: 7.4 K/UL (ref 4–11)

## 2020-06-09 PROCEDURE — 93005 ELECTROCARDIOGRAM TRACING: CPT | Performed by: EMERGENCY MEDICINE

## 2020-06-09 PROCEDURE — 85025 COMPLETE CBC W/AUTO DIFF WBC: CPT

## 2020-06-09 PROCEDURE — 71045 X-RAY EXAM CHEST 1 VIEW: CPT

## 2020-06-09 PROCEDURE — 84484 ASSAY OF TROPONIN QUANT: CPT

## 2020-06-09 PROCEDURE — 6370000000 HC RX 637 (ALT 250 FOR IP): Performed by: PHYSICIAN ASSISTANT

## 2020-06-09 PROCEDURE — 99285 EMERGENCY DEPT VISIT HI MDM: CPT

## 2020-06-09 PROCEDURE — 80053 COMPREHEN METABOLIC PANEL: CPT

## 2020-06-09 RX ORDER — HYDROXYZINE PAMOATE 50 MG/1
50 CAPSULE ORAL 3 TIMES DAILY PRN
Qty: 15 CAPSULE | Refills: 0 | Status: SHIPPED | OUTPATIENT
Start: 2020-06-09 | End: 2020-06-23

## 2020-06-09 RX ORDER — ASPIRIN 81 MG/1
324 TABLET, CHEWABLE ORAL ONCE
Status: COMPLETED | OUTPATIENT
Start: 2020-06-09 | End: 2020-06-09

## 2020-06-09 RX ADMIN — ASPIRIN 81 MG 324 MG: 81 TABLET ORAL at 20:57

## 2020-06-09 ASSESSMENT — PAIN DESCRIPTION - PAIN TYPE
TYPE: ACUTE PAIN
TYPE: ACUTE PAIN

## 2020-06-09 ASSESSMENT — PAIN DESCRIPTION - LOCATION
LOCATION: CHEST
LOCATION: CHEST

## 2020-06-09 ASSESSMENT — PAIN SCALES - GENERAL
PAINLEVEL_OUTOF10: 4
PAINLEVEL_OUTOF10: 6

## 2020-06-10 ENCOUNTER — TELEPHONE (OUTPATIENT)
Dept: FAMILY MEDICINE CLINIC | Age: 30
End: 2020-06-10

## 2020-06-10 ENCOUNTER — TELEPHONE (OUTPATIENT)
Dept: OTHER | Facility: CLINIC | Age: 30
End: 2020-06-10

## 2020-06-10 LAB
EKG ATRIAL RATE: 59 BPM
EKG DIAGNOSIS: NORMAL
EKG P AXIS: 38 DEGREES
EKG P-R INTERVAL: 160 MS
EKG Q-T INTERVAL: 386 MS
EKG QRS DURATION: 90 MS
EKG QTC CALCULATION (BAZETT): 382 MS
EKG R AXIS: 42 DEGREES
EKG T AXIS: 40 DEGREES
EKG VENTRICULAR RATE: 59 BPM

## 2020-06-10 PROCEDURE — 93010 ELECTROCARDIOGRAM REPORT: CPT | Performed by: INTERNAL MEDICINE

## 2020-06-10 NOTE — ED NOTES
--Patient provided with discharge instructions and any prescriptions. --Instructions, dosing, and follow-up appointments reviewed with patient/family. No further questions or needs at this time. --Vital signs and patient stable upon discharge. --Patient ambulatory to [facility].        Red Ramsey RN  06/09/20 [id number]

## 2020-06-10 NOTE — ED PROVIDER NOTES
Number of children: Not on file    Years of education: Not on file    Highest education level: Not on file   Occupational History    Not on file   Social Needs    Financial resource strain: Not on file    Food insecurity     Worry: Not on file     Inability: Not on file    Transportation needs     Medical: Not on file     Non-medical: Not on file   Tobacco Use    Smoking status: Former Smoker     Packs/day: 1.00     Years: 10.00     Pack years: 10.00     Types: Cigarettes     Last attempt to quit: 2013     Years since quittin.5    Smokeless tobacco: Never Used   Substance and Sexual Activity    Alcohol use: No     Comment: occasionally    Drug use: No     Comment: past history of heroin use    Sexual activity: Yes     Partners: Female   Lifestyle    Physical activity     Days per week: Not on file     Minutes per session: Not on file    Stress: Not on file   Relationships    Social connections     Talks on phone: Not on file     Gets together: Not on file     Attends Alevism service: Not on file     Active member of club or organization: Not on file     Attends meetings of clubs or organizations: Not on file     Relationship status: Not on file    Intimate partner violence     Fear of current or ex partner: Not on file     Emotionally abused: Not on file     Physically abused: Not on file     Forced sexual activity: Not on file   Other Topics Concern    Not on file   Social History Narrative    Works as a  at [employer]    Has a 3year old girl    Good relationship with GF who is a      No current facility-administered medications for this encounter.       Current Outpatient Medications   Medication Sig Dispense Refill    hydrOXYzine (VISTARIL) 50 MG capsule Take 1 capsule by mouth 3 times daily as needed for Anxiety 15 capsule 0    albuterol sulfate HFA (VENTOLIN HFA) 108 (90 Base) MCG/ACT inhaler Inhale 2 puffs into the lungs 4 times daily as needed for Wheezing 3 midline. There is no point tenderness over the SI Joint. EXTREMITIES: No peripheral edema. No unilateral calf pain, redness or swelling. Moves all extremities equally. All extremities neurovascularly intact. Patellar DTRs +2 bilaterally. SKIN: Warm and dry. No acute rashes. NEUROLOGICAL: Alert and oriented. CN's 2-12 intact. No gross facial drooping. Strength 5/5, sensation intact. HSNE spine intact. PSYCHIATRIC: Normal mood and affect. RADIOLOGY  Xr Chest Portable    Result Date: 6/10/2020  EXAMINATION: ONE XRAY VIEW OF THE CHEST 6/9/2020 8:08 pm COMPARISON: None. HISTORY: ORDERING SYSTEM PROVIDED HISTORY: cp TECHNOLOGIST PROVIDED HISTORY: Reason for exam:->cp Reason for Exam: cp FINDINGS: Electrocardiographic monitor leads overlie the patient's chest.  Bilateral nipple piercings are identified. Cardiopericardial silhouette is within normal limits. Pulmonary vasculature is normal.  Focal density in the left upper lung zone favored to be related to overlap associated with left 1st costochondral junction. No focal confluent pulmonary infiltrate is identified. No evidence of pleural effusion or pneumothorax. No evidence of acute cardiopulmonary disease. HEART SCORE:  History: +0 for low suspicion  EKG: +1 for nonspecific changes   Age: [de-identified] for age <45 years  Risk factors (includes HLD, HTN, DM, tobacco use, obesity, and +FHx): +1 for 1-2 risk factors  Initial troponin: +0 for negative troponin    Heart score: 2. This falls under the following category: Score of 0-3, which indicates a very low risk for major adverse cardiac event and supports early discharge     ED COURSE   I have evaluated this patient. Attending physician was available for consultation and provided EKG interpretation. Patient received aspirin for pain, with good relief. Triage vital stable. CBC without leukocytosis or anemia. CMP unremarkable and troponin was less than 0.01.   Stable chest x-ray without evidence to 9.4 8.3 - 10.6 mg/dL    Total Protein 7.7 6.4 - 8.2 g/dL    Alb 4.5 3.4 - 5.0 g/dL    Albumin/Globulin Ratio 1.4 1.1 - 2.2    Total Bilirubin 0.4 0.0 - 1.0 mg/dL    Alkaline Phosphatase 68 40 - 129 U/L    ALT 19 10 - 40 U/L    AST 20 15 - 37 U/L    Globulin 3.2 g/dL   Troponin   Result Value Ref Range    Troponin <0.01 <0.01 ng/mL   EKG 12 Lead   Result Value Ref Range    Ventricular Rate 59 BPM    Atrial Rate 59 BPM    P-R Interval 160 ms    QRS Duration 90 ms    Q-T Interval 386 ms    QTc Calculation (Bazett) 382 ms    P Axis 38 degrees    R Axis 42 degrees    T Axis 40 degrees    Diagnosis       Sinus bradycardiaOtherwise normal ECGNo previous ECGs availableConfirmed by DIMAS PEREZ MD ([id number]) on 6/10/2020 7:16:16 AM     I estimate there is LOW risk for ACUTE CORONARY SYNDROME, INTRACRANIAL HEMORRHAGE, MALIGNANT DYSRHYTHMIA or HYPERTENSION, PULMONARY EMBOLISM, SEPSIS, SUBARACHNOID HEMORRHAGE, SUBDURAL HEMATOMA, STROKE, or THORACIC AORTIC DISSECTION, thus I consider the discharge disposition reasonable. Lily Lentz and I have discussed the diagnosis and risks, and we agree with discharging home to follow-up with their primary doctor. We also discussed returning to the Emergency Department immediately if new or worsening symptoms occur. We have discussed the symptoms which are most concerning (e.g., bloody sputum, fever, worsening pain or shortness of breath, vomiting, weakness) that necessitate immediate return. Final Impression  1. Chest pain, unspecified type      Blood pressure (!) 126/93, pulse 65, temperature 98.2 °F (36.8 °C), temperature source Oral, resp. rate 16, weight 195 lb (88.5 kg), SpO2 99 %. DISPOSITION  Patient was discharged to home in good condition.          Adam White  06/10/20 [id number]

## 2020-06-10 NOTE — TELEPHONE ENCOUNTER
Pt has asked me in the past to not discuss his care with his wife. I believe he took her off of his HIPPA, but he may have added her again. I can't change mood stabilizers without at least talking with him about the changes, side effects, titration process, and assessing suicidality. He can do a VV or telephone visit. As far as samples, we do not have pharmaceutical reps at our office and we do not handle samples by office policy. Often times the patient can contact the company directly as well. Olamide: can you call him directly and clarify this?  Thanks

## 2020-06-11 ENCOUNTER — TELEPHONE (OUTPATIENT)
Dept: OTHER | Facility: CLINIC | Age: 30
End: 2020-06-11

## 2020-07-01 NOTE — PATIENT INSTRUCTIONS
Patient Education        Patellar Tendinitis (Jumper's Knee): Exercises  Your Care Instructions  Here are some examples of typical rehabilitation exercises for your condition. Start each exercise slowly. Ease off the exercise if you start to have pain. Your doctor or physical therapist will tell you when you can start these exercises and which ones will work best for you. How to do the exercises  Straight-leg raises to the front    1. Lie on your back with your good knee bent so that your foot rests flat on the floor. Your affected leg should be straight. Make sure that your low back has a normal curve. You should be able to slip your hand in between the floor and the small of your back, with your palm touching the floor and your back touching the back of your hand. 2. Tighten the thigh muscles in your affected leg by pressing the back of your knee flat down to the floor. Hold your knee straight. 3. Keeping the thigh muscles tight and your leg straight, lift your leg up so that your heel is about 12 inches off the floor. 4. Hold for about 6 seconds, then lower your leg slowly. Rest for up to 10 seconds between repetitions. 5. Repeat 8 to 12 times. Short-arc quad    1. Lie on your back with your knees bent over a foam roll or large rolled-up towel and your heels on the floor. 2. Lift the lower part of your affected leg until your leg is straight. Keep the back of your knee on the foam roll or towel. 3. Hold your leg straight for about 6 seconds, then slowly bend your knee and lower your heel back to the floor. Rest for up to 10 seconds between repetitions. 4. Repeat 8 to 12 times. Half-squat with knees and feet turned out to the side    1. Stand with your feet about shoulder-width apart and turned out to the side about 45 degrees. 2. Keep your back straight, and tighten your buttocks. 3. Slowly bend your knees to lower your body about one-quarter of the way down toward the floor.  Try to keep your back straight at all times, and do not let your pelvis tilt forward or your knees extend beyond the tip of your toes. 4. Repeat 8 to 12 times. Step up    1. Place a single-step footstool on the floor. If you do not have a footstool, you can use a thick book, such as a phone book, a dictionary, or an encyclopedia. If you are not steady on your feet, hold on to a chair, counter, or wall while you do this exercise. 2. Keeping your back straight, step up with your affected leg. Try not to push off your back leg as you step up. Only use your affected leg to bring you up on to the step. Then lift your other leg on to the step. As you step up, try to keep your knee moving in a straight line with your middle toe. 3. Move back to the starting position, with both feet on the floor. 4. Repeat 8 to 12 times. Step down    1. Stand on a single-step footstool. If you do not have a footstool, you can use a thick book, such as a phone book, a dictionary, or an encyclopedia. If you are not steady on your feet, hold on to a chair, counter, or wall while you do this exercise. 2. Slowly step down with your good leg, allowing your heel to lightly touch the floor. As you step down, try to keep your affected knee moving in a straight line toward your middle toe. 3. Move back to the starting position, with both feet on the footstool or book. 4. Repeat 8 to 12 times. Terminal knee extension    1. Tie the ends of an exercise band together to form a loop. Attach one end of the loop to a secure object, or shut a door on it to hold it in place. (Or you can have someone hold one end of the loop to provide resistance.)  2. Loop the other end of the exercise band around the knee of your affected leg. Keep that leg somewhat bent at the knee. 3. Put your good leg about a step behind your affected leg. Then slowly straighten your affected leg by tightening the thigh muscles of that leg.   4. Hold for about 6 seconds, then return to the starting position, with your knee somewhat bent. 5. Rest for up to 10 seconds. 6. Repeat 8 to 12 times. Follow-up care is a key part of your treatment and safety. Be sure to make and go to all appointments, and call your doctor if you are having problems. It's also a good idea to know your test results and keep a list of the medicines you take. Where can you learn more? Go to [email or web address]://[email or web address].[email or web address] org and sign in to your [employer] account. Enter S451 in the [employer] box to learn more about \"Patellar Tendinitis (Jumper's Knee): Exercises. \"     If you do not have an account, please click on the \"Sign Up Now\" link. Current as of: September 20, 2018  Content Version: 11.9  © 6733-3330 [employer]. Care instructions adapted under license by [facility] ([facility]). If you have questions about a medical condition or this instruction, always ask your healthcare professional. [address] any warranty or liability for your use of this information. (4) no impairment

## 2020-07-02 ENCOUNTER — APPOINTMENT (OUTPATIENT)
Dept: CT IMAGING | Age: 30
End: 2020-07-02

## 2020-07-02 ENCOUNTER — HOSPITAL ENCOUNTER (EMERGENCY)
Age: 30
Discharge: HOME | End: 2020-07-02

## 2020-07-02 VITALS
RESPIRATION RATE: 20 BRPM | TEMPERATURE: 98.4 F | DIASTOLIC BLOOD PRESSURE: 87 MMHG | HEIGHT: 72 IN | HEART RATE: 78 BPM | WEIGHT: 210 LBS | BODY MASS INDEX: 28.44 KG/M2 | SYSTOLIC BLOOD PRESSURE: 129 MMHG | OXYGEN SATURATION: 97 %

## 2020-07-02 LAB
A/G RATIO: 1.6 (ref 1.1–2.2)
ALBUMIN SERPL-MCNC: 4.4 G/DL (ref 3.4–5)
ALP BLD-CCNC: 75 U/L (ref 40–129)
ALT SERPL-CCNC: 18 U/L (ref 10–40)
ANION GAP SERPL CALCULATED.3IONS-SCNC: 13 MMOL/L (ref 3–16)
AST SERPL-CCNC: 21 U/L (ref 15–37)
BASOPHILS ABSOLUTE: 0.1 K/UL (ref 0–0.2)
BASOPHILS RELATIVE PERCENT: 0.8 %
BILIRUB SERPL-MCNC: 0.7 MG/DL (ref 0–1)
BILIRUBIN URINE: NEGATIVE
BLOOD, URINE: ABNORMAL
BUN BLDV-MCNC: 14 MG/DL (ref 7–20)
CALCIUM SERPL-MCNC: 9 MG/DL (ref 8.3–10.6)
CHLORIDE BLD-SCNC: 100 MMOL/L (ref 99–110)
CLARITY: CLEAR
CO2: 23 MMOL/L (ref 21–32)
COLOR: YELLOW
CREAT SERPL-MCNC: 0.8 MG/DL (ref 0.9–1.3)
EOSINOPHILS ABSOLUTE: 0.1 K/UL (ref 0–0.6)
EOSINOPHILS RELATIVE PERCENT: 1.3 %
EPITHELIAL CELLS, UA: ABNORMAL /HPF (ref 0–5)
GFR AFRICAN AMERICAN: >60
GFR NON-AFRICAN AMERICAN: >60
GLOBULIN: 2.8 G/DL
GLUCOSE BLD-MCNC: 87 MG/DL (ref 70–99)
GLUCOSE URINE: NEGATIVE MG/DL
HCT VFR BLD CALC: 45.5 % (ref 40.5–52.5)
HEMOGLOBIN: 15.8 G/DL (ref 13.5–17.5)
KETONES, URINE: NEGATIVE MG/DL
LEUKOCYTE ESTERASE, URINE: NEGATIVE
LIPASE: 38 U/L (ref 13–60)
LYMPHOCYTES ABSOLUTE: 2 K/UL (ref 1–5.1)
LYMPHOCYTES RELATIVE PERCENT: 24.8 %
MCH RBC QN AUTO: 31.1 PG (ref 26–34)
MCHC RBC AUTO-ENTMCNC: 34.6 G/DL (ref 31–36)
MCV RBC AUTO: 90 FL (ref 80–100)
MICROSCOPIC EXAMINATION: YES
MONOCYTES ABSOLUTE: 0.5 K/UL (ref 0–1.3)
MONOCYTES RELATIVE PERCENT: 6 %
NEUTROPHILS ABSOLUTE: 5.3 K/UL (ref 1.7–7.7)
NEUTROPHILS RELATIVE PERCENT: 67.1 %
NITRITE, URINE: NEGATIVE
PDW BLD-RTO: 13.6 % (ref 12.4–15.4)
PH UA: 7 (ref 5–8)
PLATELET # BLD: 216 K/UL (ref 135–450)
PMV BLD AUTO: 9.7 FL (ref 5–10.5)
POTASSIUM SERPL-SCNC: 4 MMOL/L (ref 3.5–5.1)
PROTEIN UA: NEGATIVE MG/DL
RBC # BLD: 5.06 M/UL (ref 4.2–5.9)
RBC UA: ABNORMAL /HPF (ref 0–4)
SODIUM BLD-SCNC: 136 MMOL/L (ref 136–145)
SPECIFIC GRAVITY UA: 1.02 (ref 1–1.03)
TOTAL PROTEIN: 7.2 G/DL (ref 6.4–8.2)
URINE REFLEX TO CULTURE: ABNORMAL
URINE TYPE: ABNORMAL
UROBILINOGEN, URINE: 0.2 E.U./DL
WBC # BLD: 7.9 K/UL (ref 4–11)
WBC UA: ABNORMAL /HPF (ref 0–5)

## 2020-07-02 PROCEDURE — 85025 COMPLETE CBC W/AUTO DIFF WBC: CPT

## 2020-07-02 PROCEDURE — 6360000002 HC RX W HCPCS: Performed by: NURSE PRACTITIONER

## 2020-07-02 PROCEDURE — 2580000003 HC RX 258: Performed by: NURSE PRACTITIONER

## 2020-07-02 PROCEDURE — 96372 THER/PROPH/DIAG INJ SC/IM: CPT

## 2020-07-02 PROCEDURE — 74176 CT ABD & PELVIS W/O CONTRAST: CPT

## 2020-07-02 PROCEDURE — 96374 THER/PROPH/DIAG INJ IV PUSH: CPT

## 2020-07-02 PROCEDURE — 83690 ASSAY OF LIPASE: CPT

## 2020-07-02 PROCEDURE — 87591 N.GONORRHOEAE DNA AMP PROB: CPT

## 2020-07-02 PROCEDURE — 80053 COMPREHEN METABOLIC PANEL: CPT

## 2020-07-02 PROCEDURE — 6370000000 HC RX 637 (ALT 250 FOR IP): Performed by: NURSE PRACTITIONER

## 2020-07-02 PROCEDURE — 81001 URINALYSIS AUTO W/SCOPE: CPT

## 2020-07-02 PROCEDURE — 87491 CHLMYD TRACH DNA AMP PROBE: CPT

## 2020-07-02 PROCEDURE — 96375 TX/PRO/DX INJ NEW DRUG ADDON: CPT

## 2020-07-02 PROCEDURE — 99284 EMERGENCY DEPT VISIT MOD MDM: CPT

## 2020-07-02 RX ORDER — 0.9 % SODIUM CHLORIDE 0.9 %
500 INTRAVENOUS SOLUTION INTRAVENOUS ONCE
Status: COMPLETED | OUTPATIENT
Start: 2020-07-02 | End: 2020-07-02

## 2020-07-02 RX ORDER — METRONIDAZOLE 250 MG/1
1000 TABLET ORAL ONCE
Status: COMPLETED | OUTPATIENT
Start: 2020-07-02 | End: 2020-07-02

## 2020-07-02 RX ORDER — IBUPROFEN 400 MG/1
400 TABLET ORAL ONCE
Status: COMPLETED | OUTPATIENT
Start: 2020-07-02 | End: 2020-07-02

## 2020-07-02 RX ORDER — AZITHROMYCIN 250 MG/1
1000 TABLET, FILM COATED ORAL ONCE
Status: COMPLETED | OUTPATIENT
Start: 2020-07-02 | End: 2020-07-02

## 2020-07-02 RX ORDER — ONDANSETRON 2 MG/ML
4 INJECTION INTRAMUSCULAR; INTRAVENOUS ONCE
Status: COMPLETED | OUTPATIENT
Start: 2020-07-02 | End: 2020-07-02

## 2020-07-02 RX ORDER — CEFTRIAXONE SODIUM 250 MG/1
250 INJECTION, POWDER, FOR SOLUTION INTRAMUSCULAR; INTRAVENOUS ONCE
Status: COMPLETED | OUTPATIENT
Start: 2020-07-02 | End: 2020-07-02

## 2020-07-02 RX ORDER — MORPHINE SULFATE 4 MG/ML
4 INJECTION, SOLUTION INTRAMUSCULAR; INTRAVENOUS ONCE
Status: COMPLETED | OUTPATIENT
Start: 2020-07-02 | End: 2020-07-02

## 2020-07-02 RX ORDER — ACETAMINOPHEN 325 MG/1
650 TABLET ORAL ONCE
Status: COMPLETED | OUTPATIENT
Start: 2020-07-02 | End: 2020-07-02

## 2020-07-02 RX ORDER — ALPRAZOLAM 1 MG/1
1 TABLET ORAL NIGHTLY PRN
COMMUNITY
End: 2021-04-30

## 2020-07-02 RX ADMIN — SODIUM CHLORIDE 500 ML: 9 INJECTION, SOLUTION INTRAVENOUS at 17:09

## 2020-07-02 RX ADMIN — MORPHINE SULFATE 4 MG: 4 INJECTION, SOLUTION INTRAMUSCULAR; INTRAVENOUS at 17:34

## 2020-07-02 RX ADMIN — METRONIDAZOLE 1000 MG: 250 TABLET ORAL at 18:49

## 2020-07-02 RX ADMIN — ONDANSETRON 4 MG: 2 INJECTION INTRAMUSCULAR; INTRAVENOUS at 17:34

## 2020-07-02 RX ADMIN — CEFTRIAXONE SODIUM 250 MG: 250 INJECTION, POWDER, FOR SOLUTION INTRAMUSCULAR; INTRAVENOUS at 18:49

## 2020-07-02 RX ADMIN — IBUPROFEN 400 MG: 400 TABLET ORAL at 18:59

## 2020-07-02 RX ADMIN — AZITHROMYCIN MONOHYDRATE 1000 MG: 250 TABLET ORAL at 18:49

## 2020-07-02 RX ADMIN — ACETAMINOPHEN 650 MG: 325 TABLET ORAL at 18:59

## 2020-07-02 ASSESSMENT — PAIN DESCRIPTION - LOCATION
LOCATION: FLANK
LOCATION: FLANK

## 2020-07-02 ASSESSMENT — ENCOUNTER SYMPTOMS
SHORTNESS OF BREATH: 0
SORE THROAT: 0
RHINORRHEA: 0
ABDOMINAL PAIN: 0
COLOR CHANGE: 0

## 2020-07-02 ASSESSMENT — PAIN SCALES - GENERAL
PAINLEVEL_OUTOF10: 7
PAINLEVEL_OUTOF10: 6
PAINLEVEL_OUTOF10: 7
PAINLEVEL_OUTOF10: 7
PAINLEVEL_OUTOF10: 6

## 2020-07-02 ASSESSMENT — PAIN DESCRIPTION - PAIN TYPE
TYPE: ACUTE PAIN
TYPE: ACUTE PAIN

## 2020-07-02 NOTE — ED PROVIDER NOTES
Evaluated by [id number] [facility] Provider          [facility] ED  EMERGENCY DEPARTMENT ENCOUNTER        Pt Name: Claude Parsley  MRN: [id number]  [place] 1990  Dateof evaluation: 7/2/2020  Provider: NORM Wood - CNP  PCP: Moises Mims MD  ED Attending: No att. providers found    [facility]       Chief Complaint   Patient presents with    Flank Pain     left        HISTORY OF PRESENTILLNESS   (Location/Symptom, Timing/Onset, Context/Setting, Quality, Duration, Modifying Factors, Severity)  Note limiting factors. Claude Parsley is a 30 y.o. male for left flank pain. Onset was 5 days. Duration has been since the onset. Context includes pt states he has had left-sided flank pain for the last 5 days. Patient denies any fever but states he is had nausea. Patient also reports hematuria. Patient does have a history of kidney stones. Alleviating factors include nothing. Aggravating factors include nothing. Pain is 7/10. Ibuprofen has been used for pain today. Nursing Notes were all reviewed and agreed with or any disagreements were addressed  in the HPI. REVIEW OF SYSTEMS    (2-9 systems for level 4, 10 or more for level 5)     Review of Systems   Constitutional: Negative for fever. HENT: Negative for congestion, rhinorrhea and sore throat. Respiratory: Negative for shortness of breath. Cardiovascular: Negative for chest pain. Gastrointestinal: Negative for abdominal pain. Genitourinary: Positive for flank pain and hematuria. Negative for decreased urine volume and difficulty urinating. Musculoskeletal: Negative for arthralgias and myalgias. Skin: Negative for color change and rash. Neurological: Negative for dizziness and light-headedness. Psychiatric/Behavioral: Negative for agitation. All other systems reviewed and are negative. Positives and Pertinent negatives as per HPI.   Except as noted above in the ROS, all other systems were reviewed and negative. PAST MEDICAL HISTORY     Past Medical History:   Diagnosis Date    Anxiety     Carpal tunnel syndrome     Depression     Headache     Pneumothorax          SURGICAL HISTORY       Past Surgical History:   Procedure Laterality Date    CHEST TUBE INSERTION           CURRENT MEDICATIONS       Previous Medications    ALBUTEROL SULFATE HFA (VENTOLIN HFA) 108 (90 BASE) MCG/ACT INHALER    Inhale 2 puffs into the lungs 4 times daily as needed for Wheezing    ALPRAZOLAM (XANAX) 1 MG TABLET    Take 1 mg by mouth nightly as needed for Sleep.     FLUTICASONE (FLONASE) 50 MCG/ACT NASAL SPRAY    2 sprays by Each Nostril route daily    LAMOTRIGINE (LAMICTAL) 150 MG TABLET    Take 0.5 tablets by mouth 2 times daily    LAMOTRIGINE (LAMICTAL) 25 MG CHEW CHEW TAB    Take 2 tablets by mouth 2 times daily    VALACYCLOVIR (VALTREX) 1 G TABLET    Take 2 tablets by mouth daily         ALLERGIES     Cyclobenzaprine; Diclofenac; Sulfamethoxazole-trimethoprim; and Sulfa antibiotics    FAMILY HISTORY       Family History   Problem Relation Age of Onset    Diabetes Father     Heart Attack Maternal Grandmother     Heart Disease Maternal Grandmother     Prostate Cancer Maternal Grandfather     Cancer Paternal Grandfather     Prostate Cancer Paternal Grandfather           SOCIAL HISTORY       Social History     Socioeconomic History    Marital status:      Spouse name: None    Number of children: None    Years of education: None    Highest education level: None   Occupational History    None   Social Needs    Financial resource strain: None    Food insecurity     Worry: None     Inability: None    Transportation needs     Medical: None     Non-medical: None   Tobacco Use    Smoking status: Former Smoker     Packs/day: 1.00     Years: 10.00     Pack years: 10.00     Types: Cigarettes     Last attempt to quit: 2013     Years since quittin.6    Smokeless tobacco: Never Used   Substance and Sexual Activity    Alcohol use: No     Comment: occasionally    Drug use: No     Comment: past history of heroin use    Sexual activity: Yes     Partners: Female   Lifestyle    Physical activity     Days per week: None     Minutes per session: None    Stress: None   Relationships    Social connections     Talks on phone: None     Gets together: None     Attends Mandaen service: None     Active member of club or organization: None     Attends meetings of clubs or organizations: None     Relationship status: None    Intimate partner violence     Fear of current or ex partner: None     Emotionally abused: None     Physically abused: None     Forced sexual activity: None   Other Topics Concern    None   Social History [place]    Works as a  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 HENT:      Head: Normocephalic and atraumatic. Neck:      Musculoskeletal: Normal range of motion. Cardiovascular:      Rate and Rhythm: Normal rate. Pulmonary:      Effort: Pulmonary effort is normal. No respiratory distress. Abdominal:      General: Bowel sounds are normal. There is no distension. Palpations: Abdomen is soft. Tenderness: There is no abdominal tenderness. There is left CVA tenderness. Musculoskeletal: Normal range of motion. Skin:     General: Skin is warm and dry. Neurological:      Mental Status: He is alert and oriented to person, place, and time.          DIAGNOSTIC RESULTS   LABS:    Labs Reviewed   COMPREHENSIVE METABOLIC PANEL - Abnormal; Notable for the following components:       Result Value CREATININE 0.8 (*)     All other components within normal limits    Narrative:     Performed at:  [facility]) [facility] [address],  [place], [facility]   Phone [phone]   URINE RT REFLEX TO CULTURE - Abnormal; Notable for the following components:    Blood, Urine LARGE (*)     All other components within normal limits    Narrative:     Performed at:  [facility],  [place], [place] [facility] [facility]   Phone [phone]   MICROSCOPIC URINALYSIS - Abnormal; Notable for the following components:    RBC, UA  (*)     All other components within normal limits    Narrative:     Performed at:  [place] [address],  [place], [facility]   Phone  DNA, URINE   CBC WITH AUTO DIFFERENTIAL    Narrative:     Performed at:  [place] [address],  [place], [facility]   Phone [phone]   LIPASE    Narrative:     Performed at:  [facility]) - [facility] [address],  [place], [place] [facility] [facility]   Phone [phone]       All other labs werewithin normal range or not returned as of this dictation. EKG: All EKG's are interpreted by the Emergency Department Physician who either signs or Co-signs this chart in the absence of acardiologist.  Please see their note for interpretation of EKG. RADIOLOGY:     CT abdomen pelvis without contrast interpreted by radiologist for  Impression:    No obstructive uropathy. Fat containing paraumbilical hernia. Right lower quadrant mesenteric lymph nodes and left inguinal lymph node,  upper limits of normal in size which may be followed as clinically warranted. Interpretation per the Radiologist below, if available at the time of this note:    CT ABDOMEN PELVIS WO CONTRAST   Final Result   No obstructive uropathy.       Fat containing paraumbilical hernia. Right lower quadrant mesenteric lymph nodes and left inguinal lymph node,   upper limits of normal in size which may be followed as clinically warranted. No results found. PROCEDURES   Unless otherwise noted below, none     Procedures     CRITICAL CARE TIME   N/A    CONSULTS:  None      EMERGENCYDEPARTMENT COURSE and DIFFERENTIAL DIAGNOSIS/MDM:   Vitals:    Vitals:    07/02/20 1738 07/02/20 1804 07/02/20 1834 07/02/20 1851   BP: 136/84 115/84 126/77 129/87   Pulse: 82  85 78   Resp: 20      Temp:    98.4 °F (36.9 °C)   TempSrc:       SpO2: 96% 96% 100% 97%   Weight:       Height:           Patient was given the following medications:  Medications   0.9 % sodium chloride bolus (0 mLs Intravenous Stopped 7/2/20 1836)   morphine sulfate (PF) injection 4 mg (4 mg Intravenous Given 7/2/20 1734)   ondansetron (ZOFRAN) injection 4 mg (4 mg Intravenous Given 7/2/20 1734)   cefTRIAXone (ROCEPHIN) injection 250 mg (250 mg Intramuscular Given 7/2/20 1849)   azithromycin (ZITHROMAX) tablet 1,000 mg (1,000 mg Oral Given 7/2/20 1849)   metroNIDAZOLE (FLAGYL) tablet 1,000 mg (1,000 mg Oral Given 7/2/20 1849)   acetaminophen (TYLENOL) tablet 650 mg (650 mg Oral Given 7/2/20 1859)   ibuprofen (ADVIL;MOTRIN) tablet 400 mg (400 mg Oral Given 7/2/20 1859)       Patient was seen and evaluated by myself. Patient here for left-sided flank pain. Patient reports that he has had pain for the last 5 days. He denies any fever but states he has had nausea. On exam he is awake and alert hemodynamically stable nontoxic in appearance. Lab values have been reviewed and interpreted. Patient did have hematuria. Abdominal CT was concerning for no obstructive uropathy however there was a fat-containing periumbilical hernia. There was also right lower quadrant mesenteric lymph nodes and left inguinal lymph nodes. When asked about urinary symptoms the patient does now describe dysuria.   Patient will be treated with Rocephin Flagyl and Zithromax in the ED. He was given IV fluids and nausea and pain medications. Patient will be discharged home with instructions to follow-up with his primary care doctor in the next few days. He was given a referral to follow-up with urology. He was encouraged to return to the ED for worsening symptoms. Patient requested Tylenol and Motrin prior to discharge which was provided. He was ultimately discharged home with all questions answered. The patient tolerated their visit well. I have evaluated thispatient. My supervising physician was available for consultation. The patient and / or the family were informed of the results of any tests, a time was given to answer questions, a plan was proposed and they agreed Patricia Ortiz. FINAL IMPRESSION      1. Flank pain, acute    2. Hematuria, unspecified type    3. Dysuria    4. Lymphadenopathy, mesenteric    5. Umbilical hernia without obstruction and without gangrene          DISPOSITION/PLAN   DISPOSITION Decision To Discharge 07/02/2020 07:03:00 PM      PATIENT REFERRED TO:  Jaynie Paget, MD  [address]  [phone]    Schedule an appointment as soon as possible for a visit in 2 days  If symptoms worsen    [facility] [place] [facility]  [address] [facility]  [phone]    If symptoms worsen    Marcela Montiel MD  [address] [facility],[address] [facility]  [phone]      for re-evaluation    Belgica Mercado MD  [address] [place] [address] Dr Skyler Wing [address] [place] [phone]    Schedule an appointment as soon as possible for a visit   for re-evaluation      DISCHARGE MEDICATIONS:  New Prescriptions    No medications on file       DISCONTINUED MEDICATIONS:  Discontinued Medications    CLOTRIMAZOLE (LOTRIMIN) 1 % EXTERNAL SOLUTION    Apply 3-4 drops 2 times daily for 10 days .     HYDROXYZINE (ATARAX) 25 MG TABLET    Take 1 tablet by mouth nightly NEOMYCIN-POLYMYXIN-HYDROCORTISONE (CORTISPORIN) [id number] OTIC SUSPENSION    Place 3 drops into both ears 4 times daily              (Please note that portions of this note were completed with a voice recognition program.  Efforts were made to edit the dictations but occasionally words are mis-transcribed.)    NORM Gavin - CNP (electronically signed)         NORM Gavin CNP  07/02/20 NORM Lorenz CNP  07/02/20 2843

## 2020-07-02 NOTE — ED NOTES
Reviewed patient discharge instructions at this time, copy given to patient. No questions or concerns. Patient voiced understanding.       Garrick Silver RN  07/02/20 3913

## 2020-07-03 LAB
C. TRACHOMATIS DNA ,URINE: NEGATIVE
N. GONORRHOEAE DNA, URINE: NEGATIVE

## 2020-07-09 ENCOUNTER — TELEPHONE (OUTPATIENT)
Dept: FAMILY MEDICINE CLINIC | Age: 30
End: 2020-07-09

## 2020-07-14 ENCOUNTER — TELEPHONE (OUTPATIENT)
Dept: FAMILY MEDICINE CLINIC | Age: 30
End: 2020-07-14

## 2020-10-01 ENCOUNTER — OFFICE VISIT (OUTPATIENT)
Dept: PRIMARY CARE CLINIC | Age: 30
End: 2020-10-01

## 2020-10-01 PROCEDURE — 99211 OFF/OP EST MAY X REQ PHY/QHP: CPT | Performed by: NURSE PRACTITIONER

## 2020-10-01 NOTE — PATIENT INSTRUCTIONS
Advance Care Planning  People with COVID-19 may have no symptoms, mild symptoms, such as fever, cough, and shortness of breath or they may have more severe illness, developing severe and fatal pneumonia. As a result, Advance Care Planning with attention to naming a health care decision maker (someone you trust to make healthcare decisions for you if you could not speak for yourself) and sharing other health care preferences is important BEFORE a possible health crisis. Please contact your Primary Care Provider to discuss Advance Care Planning. Preventing the Spread of Coronavirus Disease 2019 in Homes and Residential Communities  For the most recent information go to [email or web address]    Prevention steps for People with confirmed or suspected COVID-19 (including persons under investigation) who do not need to be hospitalized  and   People with confirmed COVID-19 who were hospitalized and determined to be medically stable to go home    Your healthcare provider and public health staff will evaluate whether you can be cared for at home. If it is determined that you do not need to be hospitalized and can be isolated at home, you will be monitored by staff from your local or state health department. You should follow the prevention steps below until a healthcare provider or local or state health department says you can return to your normal activities. Stay home except to get medical care  People who are mildly ill with COVID-19 are able to isolate at home during their illness. You should restrict activities outside your home, except for getting medical care. Do not go to work, school, or public areas. Avoid using public transportation, ride-sharing, or taxis. Separate yourself from other people and animals in your home  People: As much as possible, you should stay in a specific room and away from other people in your home.  Also, you should use a separate before eating or preparing food. If soap and water are not readily available, use an alcohol-based hand  with at least 60% alcohol, covering all surfaces of your hands and rubbing them together until they feel dry. Soap and water are the best option if hands are visibly dirty. Avoid touching your eyes, nose, and mouth with unwashed hands. Avoid sharing personal household items  You should not share dishes, drinking glasses, cups, eating utensils, towels, or bedding with other people or pets in your home. After using these items, they should be washed thoroughly with soap and water. Clean all high-touch surfaces everyday  High touch surfaces include counters, tabletops, doorknobs, bathroom fixtures, toilets, phones, keyboards, tablets, and bedside tables. Also, clean any surfaces that may have blood, stool, or body fluids on them. Use a household cleaning spray or wipe, according to the label instructions. Labels contain instructions for safe and effective use of the cleaning product including precautions you should take when applying the product, such as wearing gloves and making sure you have good ventilation during use of the product. Monitor your symptoms  Seek prompt medical attention if your illness is worsening (e.g., difficulty breathing). Before seeking care, call your healthcare provider and tell them that you have, or are being evaluated for, COVID-19. Put on a facemask before you enter the facility. These steps will help the healthcare providers office to keep other people in the office or waiting room from getting infected or exposed. Ask your healthcare provider to call the local or state health department. Persons who are placed under active monitoring or facilitated self-monitoring should follow instructions provided by their local health department or occupational health professionals, as appropriate. When working with your local health department check their available hours.   If you

## 2020-10-01 NOTE — PROGRESS NOTES
Ting Mandeepeldon received a viral test for COVID-19. They were educated on isolation and quarantine as appropriate. For any symptoms, they were directed to seek care from their PCP, given contact information to establish with a doctor, directed to an urgent care or the emergency room.

## 2020-10-02 LAB — SARS-COV-2, NAA: NOT DETECTED

## 2020-10-05 RX ORDER — ALBUTEROL SULFATE 90 UG/1
2 AEROSOL, METERED RESPIRATORY (INHALATION) 4 TIMES DAILY PRN
Qty: 3 INHALER | Refills: 3 | Status: SHIPPED | OUTPATIENT
Start: 2020-10-05 | End: 2021-04-30

## 2020-10-05 NOTE — TELEPHONE ENCOUNTER
----- Message from Ana Llanes sent at 10/2/2020  6:06 PM EDT -----  Subject: Refill Request    QUESTIONS  Name of Medication? albuterol sulfate HFA (VENTOLIN HFA) 108 (90 Base)   MCG/ACT inhaler  Patient-reported dosage and instructions? Please see notes below   How many days do you have left? 0  Preferred Pharmacy? [address] [place] [address] Drive [address] [facility] [phone] - F [phone]  Pharmacy phone number (if available)? [phone]  Additional Information for Provider? Tarsha Barragan prescribed ALPRAZolam   (XANAX) 1 MG tablet-- needs refill--- can not get ahold of anyone   has not taken medication in 2/3 days --- [address] [place] [address] Drive [address] [facility] Alannelson Venegas [phone]  ---------------------------------------------------------------------------  --------------  Darl Boom INFO  What is the best way for the office to contact you?  OK to leave message on   voicemail  Preferred Call Back Phone Number? [phone]

## 2021-04-30 ENCOUNTER — TELEMEDICINE (OUTPATIENT)
Dept: FAMILY MEDICINE CLINIC | Age: 31
End: 2021-04-30
Payer: COMMERCIAL

## 2021-04-30 DIAGNOSIS — F33.2 SEVERE EPISODE OF RECURRENT MAJOR DEPRESSIVE DISORDER, WITHOUT PSYCHOTIC FEATURES (HCC): ICD-10-CM

## 2021-04-30 DIAGNOSIS — F41.1 GENERALIZED ANXIETY DISORDER: ICD-10-CM

## 2021-04-30 DIAGNOSIS — F31.4 BIPOLAR 1 DISORDER, DEPRESSED, SEVERE (HCC): Primary | ICD-10-CM

## 2021-04-30 PROCEDURE — 99214 OFFICE O/P EST MOD 30 MIN: CPT | Performed by: NURSE PRACTITIONER

## 2021-04-30 RX ORDER — VENLAFAXINE HYDROCHLORIDE 37.5 MG/1
37.5 CAPSULE, EXTENDED RELEASE ORAL DAILY
Qty: 30 CAPSULE | Refills: 0 | Status: SHIPPED
Start: 2021-04-30 | End: 2021-05-06 | Stop reason: SINTOL

## 2021-04-30 RX ORDER — LAMOTRIGINE 25 MG/1
25 TABLET ORAL 2 TIMES DAILY
Qty: 30 TABLET | Refills: 3 | Status: SHIPPED
Start: 2021-04-30 | End: 2021-05-06 | Stop reason: SINTOL

## 2021-04-30 ASSESSMENT — ENCOUNTER SYMPTOMS
GASTROINTESTINAL NEGATIVE: 1
RESPIRATORY NEGATIVE: 1

## 2021-04-30 NOTE — PROGRESS NOTES
2021    TELEHEALTH EVALUATION -- Audio/Visual (During RJLY-80 public health emergency)    HPI:    Adrienne Aguilera (:  1990) has requested an audio/video evaluation for the following concern(s):    Pt was diagnosed with Bipolar 1 disorder 2-3 years ago. His last OV with his PCP was 2020, and Dr. Evie Mesa was 3/2020. Pt states that he did not f/u and has been off all medication b/c he lost his job a year ago d/t COVID and did not have insurance. He recently got a new job with insurance and would like to restart his medications. He stated that he took Effexor for depression and Lamictal as a mood stabilizer and both medications worked very well for him. He denies HI or SI. Review of Systems   Constitutional: Negative. Respiratory: Negative. Cardiovascular: Negative. Gastrointestinal: Negative. Neurological: Negative. Psychiatric/Behavioral: Positive for agitation and dysphoric mood. Negative for self-injury, sleep disturbance and suicidal ideas. The patient is nervous/anxious. Prior to Visit Medications    Medication Sig Taking?  Authorizing Provider   venlafaxine (EFFEXOR XR) 37.5 MG extended release capsule Take 1 capsule by mouth daily Yes NORM Bean CNP   lamoTRIgine (LAMICTAL) 25 MG tablet Take 1 tablet by mouth 2 times daily Yes NORM Bean CNP       Social History     Tobacco Use    Smoking status: Former Smoker     Packs/day: 1.00     Years: 10.00     Pack years: 10.00     Types: Cigarettes     Quit date: 2013     Years since quittin.4    Smokeless tobacco: Never Used   Substance Use Topics    Alcohol use: No     Comment: occasionally    Drug use: No     Comment: past history of heroin use        Allergies   Allergen Reactions    Cyclobenzaprine Other (See Comments)    Diclofenac Shortness Of Breath    Sulfamethoxazole-Trimethoprim Shortness Of Breath    Sulfa Antibiotics    ,   Past Medical History:   Diagnosis Date  Anxiety     Carpal tunnel syndrome     Depression     Headache     Pneumothorax    ,   Past Surgical History:   Procedure Laterality Date    CHEST TUBE INSERTION     ,   Social History     Tobacco Use    Smoking status: Former Smoker     Packs/day: 1.00     Years: 10.00     Pack years: 10.00     Types: Cigarettes     Quit date: 2013     Years since quittin.4    Smokeless tobacco: Never Used   Substance Use Topics    Alcohol use: No     Comment: occasionally    Drug use: No     Comment: past history of heroin use   ,   Family History   Problem Relation Age of Onset    Diabetes Father     Heart Attack Maternal Grandmother     Heart Disease Maternal Grandmother     Prostate Cancer Maternal Grandfather     Cancer Paternal Mac Kiang Prostate Cancer Paternal Grandfather    ,   Immunization History   Administered Date(s) Administered    Tdap (Boostrix, Adacel) 2017, 10/16/2018   ,   Health Maintenance   Topic Date Due    Varicella vaccine (1 of 2 - 2-dose childhood series) Never done    COVID-19 Vaccine (1) Never done    Hepatitis B vaccine (1 of 3 - Risk 3-dose series) Never done    Flu vaccine (Season Ended) 2021    DTaP/Tdap/Td vaccine (3 - Td) 10/16/2028    HIV screen  Completed    Hepatitis A vaccine  Aged Out    Hib vaccine  Aged Out    Meningococcal (ACWY) vaccine  Aged Out    Pneumococcal 0-64 years Vaccine  Aged Out       PHYSICAL EXAMINATION:  Telephone visit  [ INSTRUCTIONS:  \"[x]\" Indicates a positive item  \"[]\" Indicates a negative item  -- DELETE ALL ITEMS NOT EXAMINED]  Vital Signs: (As obtained by patient/caregiver or practitioner observation)    Blood pressure-  Heart rate-    Respiratory rate-    Temperature-  Pulse oximetry-     Constitutional: [] Appears well-developed and well-nourished [] No apparent distress      [] Abnormal-   Mental status  [] Alert and awake  [] Oriented to person/place/time []Able to follow commands      Eyes:  EOM    [] Normal  [] Abnormal-  Sclera  []  Normal  [] Abnormal -         Discharge []  None visible  [] Abnormal -    HENT:   [] Normocephalic, atraumatic. [] Abnormal   [] Mouth/Throat: Mucous membranes are moist.     External Ears [] Normal  [] Abnormal-     Neck: [] No visualized mass     Pulmonary/Chest: [] Respiratory effort normal.  [] No visualized signs of difficulty breathing or respiratory distress        [] Abnormal-      Musculoskeletal:   [] Normal gait with no signs of ataxia         [] Normal range of motion of neck        [] Abnormal-       Neurological:        [] No Facial Asymmetry (Cranial nerve 7 motor function) (limited exam to video visit)          [] No gaze palsy        [] Abnormal-         Skin:        [] No significant exanthematous lesions or discoloration noted on facial skin         [] Abnormal-            Psychiatric:       [] Normal Affect [] No Hallucinations        [] Abnormal-     Other pertinent observable physical exam findings-     ASSESSMENT/PLAN:  1. Severe episode of recurrent major depressive disorder, without psychotic features ([facility]  Will restart the pt on Effexor and Lamictal since that worked very well for him. Advised pt to f/u with Evie Mesa, and PCP in 4 weeks. May need to increase the Lamictal to 50 mg per day after 2 weeks. - External Referral To Psychology  - venlafaxine (EFFEXOR XR) 37.5 MG extended release capsule; Take 1 capsule by mouth daily  Dispense: 30 capsule; Refill: 0    2. Bipolar 1 disorder, depressed, severe ([facility]  See #1  - External Referral To Psychology  - lamoTRIgine (LAMICTAL) 25 MG tablet; Take 1 tablet by mouth 2 times daily  Dispense: 30 tablet; Refill: 3    3. Generalized anxiety disorder  See #1  - External Referral To Psychology  - venlafaxine (EFFEXOR XR) 37.5 MG extended release capsule; Take 1 capsule by mouth daily  Dispense: 30 capsule; Refill: 0      Return in about 4 weeks (around 5/28/2021) for Anxiety, Depression.     Teresa Wallace

## 2021-04-30 NOTE — LETTER
[address]  [facility] [address]  Phone: [phone]  Fax: Corinna, [facility] - [phone] [facility]        May 5, 2021     Patient: Demetri Willingham   YOB: 1990   Date of Visit: 4/30/2021       To Whom It May Concern: It is my medical opinion that Babatunde Butt please excuse him from work 5/3/21-5/5/21. If you have any questions or concerns, please don't hesitate to call.     Sincerely,        NORM Villanueva - CNP

## 2021-05-04 ENCOUNTER — TELEPHONE (OUTPATIENT)
Dept: FAMILY MEDICINE CLINIC | Age: 31
End: 2021-05-04

## 2021-05-04 NOTE — TELEPHONE ENCOUNTER
Pt. States he has never Effexor and Lamictal together and he thinks taking them together might be the cause of his nausea. States he was nauseated over the weekend but it is somewhat improving today.

## 2021-05-04 NOTE — TELEPHONE ENCOUNTER
Pt called in stating that he was prescribed Effexor and it is making him feel dizzy. Pt states that he has been missing work because of that and is wanting a letter for his job stating that he was prescribed that medication, to be excused for his absents.

## 2021-05-04 NOTE — TELEPHONE ENCOUNTER
Pt. States since his nausea has been improving he is going to continue the Effexor. Pt. Will call back with fax number for a work note.

## 2021-05-05 ENCOUNTER — TELEPHONE (OUTPATIENT)
Dept: FAMILY MEDICINE CLINIC | Age: 31
End: 2021-05-05

## 2021-05-05 NOTE — TELEPHONE ENCOUNTER
I spoke with patient he states that he has not taking the Effexor since yesterday. He does have a headache, Nausea, and feels he has lost 10 lbs since last week. He states that he feels that it is the Effexor. I will discontinue the Effexor and is scheduled to see you tomorrow for a vv at 11 am.   He is asking about vrayrl medication. He states that he has good things about that medication. He needs a work note for Monday, Tuesday, and today. Please email [email or web address]

## 2021-05-05 NOTE — TELEPHONE ENCOUNTER
Pt. Called back asking to be seen today due to the physical symptoms related to depression meds. Feeling \" out of my body\"  There are no appts available. Please advise.

## 2021-05-05 NOTE — TELEPHONE ENCOUNTER
Pt. States he took the Effexor yesterday am and last night felt like he was \"outside of his body\", increased anxiety, and nausea.   He did not take the Effexor this am but continued taking the Lamictal.

## 2021-05-05 NOTE — TELEPHONE ENCOUNTER
Ailyn Chávez have printed the work letter. Please email it for him. We can discuss everything else at his appointment tomorrow.

## 2021-05-05 NOTE — TELEPHONE ENCOUNTER
I would recommend that he discontinue the Effexor to see if symptoms resolve. He can be scheduled with any provider with availability. There was another message about a work note. Did he receive this note from Morris Nicole or Dr. Cornelius Calderon? If not, what day did he miss, and where should we send it?

## 2021-05-06 ENCOUNTER — TELEPHONE (OUTPATIENT)
Dept: FAMILY MEDICINE CLINIC | Age: 31
End: 2021-05-06

## 2021-05-06 ENCOUNTER — OFFICE VISIT (OUTPATIENT)
Dept: FAMILY MEDICINE CLINIC | Age: 31
End: 2021-05-06
Payer: COMMERCIAL

## 2021-05-06 VITALS — WEIGHT: 201 LBS | BODY MASS INDEX: 27.22 KG/M2 | HEIGHT: 72 IN

## 2021-05-06 DIAGNOSIS — F33.2 SEVERE EPISODE OF RECURRENT MAJOR DEPRESSIVE DISORDER, WITHOUT PSYCHOTIC FEATURES (HCC): ICD-10-CM

## 2021-05-06 DIAGNOSIS — F31.4 BIPOLAR 1 DISORDER, DEPRESSED, SEVERE (HCC): Primary | ICD-10-CM

## 2021-05-06 PROCEDURE — 99213 OFFICE O/P EST LOW 20 MIN: CPT | Performed by: NURSE PRACTITIONER

## 2021-05-06 ASSESSMENT — ENCOUNTER SYMPTOMS
BLOOD IN STOOL: 0
CONSTIPATION: 0
NAUSEA: 1
VOMITING: 1
RESPIRATORY NEGATIVE: 1
ABDOMINAL DISTENTION: 0
DIARRHEA: 0

## 2021-05-06 NOTE — LETTER
[address]  [facility] [address]  Phone: [phone]  Fax: Corinna, [facility] - [phone] [facility]        May 6, 2021     Patient: Lorena Leija   YOB: 1990   Date of Visit: 5/6/2021       To Whom It May Concern: It is my medical opinion that Dank Carlisle may return to work on 5/7/2021. .    If you have any questions or concerns, please don't hesitate to call.     Sincerely,          Siva Bethea, NORM - CNP

## 2021-05-06 NOTE — PROGRESS NOTES
Types: Cigarettes     Quit date: 2013     Years since quittin.4    Smokeless tobacco: Never Used   Substance Use Topics    Alcohol use: No     Comment: occasionally    Drug use: No     Comment: past history of heroin use        Allergies   Allergen Reactions    Cyclobenzaprine Other (See Comments)    Diclofenac Shortness Of Breath    Sulfamethoxazole-Trimethoprim Shortness Of Breath    Sulfa Antibiotics    ,   Past Medical History:   Diagnosis Date    Anxiety     Carpal tunnel syndrome     Depression     Headache     Pneumothorax    ,   Past Surgical History:   Procedure Laterality Date    CHEST TUBE INSERTION     ,   Social History     Tobacco Use    Smoking status: Former Smoker     Packs/day: 1.00     Years: 10.00     Pack years: 10.00     Types: Cigarettes     Quit date: 2013     Years since quittin.4    Smokeless tobacco: Never Used   Substance Use Topics    Alcohol use: No     Comment: occasionally    Drug use: No     Comment: past history of heroin use   ,   Family History   Problem Relation Age of Onset    Diabetes Father     Heart Attack Maternal Grandmother     Heart Disease Maternal Grandmother     Prostate Cancer Maternal Grandfather     Cancer Paternal Marella Layer Prostate Cancer Paternal Grandfather    ,   Immunization History   Administered Date(s) Administered    Tdap (Boostrix, Adacel) 2017, 10/16/2018   ,   Health Maintenance   Topic Date Due    Varicella vaccine (1 of 2 - 2-dose childhood series) Never done    COVID-19 Vaccine (1) Never done    Hepatitis B vaccine (1 of 3 - Risk 3-dose series) Never done    Flu vaccine (Season Ended) 2021    DTaP/Tdap/Td vaccine (3 - Td) 10/16/2028    HIV screen  Completed    Hepatitis A vaccine  Aged Out    Hib vaccine  Aged Out    Meningococcal (ACWY) vaccine  Aged Out    Pneumococcal 0-64 years Vaccine  Aged Out       PHYSICAL EXAMINATION:  [ INSTRUCTIONS:  \"[x]\" Indicates a positive item \"[]\" Indicates a negative item  -- DELETE ALL ITEMS NOT EXAMINED]  Vital Signs: (As obtained by patient/caregiver or practitioner observation)    Blood pressure-  Heart rate-    Respiratory rate-    Temperature-  Pulse oximetry-     Constitutional: [x] Appears well-developed and well-nourished [x] No apparent distress      [] Abnormal-   Mental status  [x] Alert and awake  [x] Oriented to person/place/time [x]Able to follow commands      Eyes:  EOM    []  Normal  [] Abnormal-  Sclera  []  Normal  [] Abnormal -         Discharge []  None visible  [] Abnormal -    HENT:   [x] Normocephalic, atraumatic. [] Abnormal   [x] Mouth/Throat: Mucous membranes are moist.     External Ears [] Normal  [] Abnormal-     Neck: [] No visualized mass     Pulmonary/Chest: [x] Respiratory effort normal.  [x] No visualized signs of difficulty breathing or respiratory distress        [] Abnormal-      Musculoskeletal:   [] Normal gait with no signs of ataxia         [] Normal range of motion of neck        [] Abnormal-       Neurological:        [] No Facial Asymmetry (Cranial nerve 7 motor function) (limited exam to video visit)          [] No gaze palsy        [] Abnormal-         Skin:        [x] No significant exanthematous lesions or discoloration noted on facial skin         [] Abnormal-            Psychiatric:       [x] Normal Affect [x] No Hallucinations        [] Abnormal-     Other pertinent observable physical exam findings-     ASSESSMENT/PLAN:  1. Severe episode of recurrent major depressive disorder, without psychotic features ([facility] [address]  Advised to d/c the Effexor and Lamictal.  Will start Jayna Faustin as directed. Has a scheduled f/u appointment with me as well as with Dr. Leo Peters. - cariprazine hcl (VRAYLAR) 1.5 MG capsule; Take 1 capsule by mouth daily  Dispense: 30 capsule; Refill: 0    2. Bipolar 1 disorder, depressed, severe ([address]  See #1  - cariprazine hcl (VRAYLAR) 1.5 MG capsule;  Take 1 capsule by mouth daily

## 2021-05-07 NOTE — TELEPHONE ENCOUNTER
Submitted PA for Vraylar 1.5MG capsules, (Key: [id number]. Medication has been DENIED. See denial letter attached. Please notify patient. Thank you.

## 2021-05-10 NOTE — TELEPHONE ENCOUNTER
Ivan Elder, I will let you review for your plan for the patient. It did not appear he was trailed on one of the ones they suggest. Please advise.  Thanks, Agnieszka Randle

## 2021-05-12 NOTE — TELEPHONE ENCOUNTER
Last I spoke with pt prior to my maternity leave, he was seeing psychiatry/psychology. They were prescribing benzodiazepines at the time and I would not prescribe these for him when he could not get ahold of them. I'm assuming he is no longer seeing psychiatry? Can we get more clarification on the status of that? Thank you.

## 2021-05-12 NOTE — TELEPHONE ENCOUNTER
Hey there, he is not seeing psychiatry/psychology any longer b/c he was without insurance for the past year.

## 2021-05-19 ENCOUNTER — TELEPHONE (OUTPATIENT)
Dept: FAMILY MEDICINE CLINIC | Age: 31
End: 2021-05-19

## 2021-05-19 NOTE — TELEPHONE ENCOUNTER
I was under the impression that the pt was not taking the Vraylar b/c the PA was denied. Is there anyone that can see him today or tomorrow? I am on PTO tomorrow and Friday.

## 2021-05-19 NOTE — TELEPHONE ENCOUNTER
I have called patient 6 times and line has been busy each time. I will try to call him in the morning!

## 2021-05-19 NOTE — TELEPHONE ENCOUNTER
Pt calling because when he got to work this morning that he felt like it was a panic attack but he stated that he started to sweat really bad and was seeing white and felt like his heart was racing really fast. Pt stated that he called off of work and went home but the symptoms keep coming and going. Pt didn't know if it might be a reaction to the vraylar 1.5mg  that Barney Dumonts put him on 5/6 but its been 2 weeks and this is the first time this has happened. Pt was asking for a vv apt today to see either Jazmyn Chao or Barney Dumonts.  Please Advise

## 2021-05-20 ENCOUNTER — TELEPHONE (OUTPATIENT)
Dept: FAMILY MEDICINE CLINIC | Age: 31
End: 2021-05-20

## 2021-05-20 NOTE — TELEPHONE ENCOUNTER
Attempted to call patient this Am to schedule for today @10:45 am. Line was busy again. If I am unable to get a hold of him before 10:45 am, I will schedule him tomorrow  5/21/2021 at 3 pm with Dr. Sumit Mas.

## 2021-05-20 NOTE — TELEPHONE ENCOUNTER
Pt calls [facility] and wanted to schedule appt. Advised [facility] to inform pt PCP MA has been attempting to call and she will return call.

## 2021-05-21 ENCOUNTER — VIRTUAL VISIT (OUTPATIENT)
Dept: FAMILY MEDICINE CLINIC | Age: 31
End: 2021-05-21
Payer: COMMERCIAL

## 2021-05-21 DIAGNOSIS — F33.2 SEVERE EPISODE OF RECURRENT MAJOR DEPRESSIVE DISORDER, WITHOUT PSYCHOTIC FEATURES (HCC): ICD-10-CM

## 2021-05-21 DIAGNOSIS — F41.0 PANIC DISORDER (EPISODIC PAROXYSMAL ANXIETY): ICD-10-CM

## 2021-05-21 DIAGNOSIS — F31.4 BIPOLAR 1 DISORDER, DEPRESSED, SEVERE (HCC): Primary | ICD-10-CM

## 2021-05-21 PROBLEM — Z79.899 CHRONIC PRESCRIPTION BENZODIAZEPINE USE: Status: ACTIVE | Noted: 2020-07-10

## 2021-05-21 PROBLEM — R03.0 ELEVATED BP WITHOUT DIAGNOSIS OF HYPERTENSION: Status: ACTIVE | Noted: 2020-07-10

## 2021-05-21 PROCEDURE — 99214 OFFICE O/P EST MOD 30 MIN: CPT | Performed by: FAMILY MEDICINE

## 2021-05-21 RX ORDER — CLONIDINE HYDROCHLORIDE 0.1 MG/1
0.1 TABLET ORAL 2 TIMES DAILY PRN
Qty: 30 TABLET | Refills: 0 | Status: SHIPPED | OUTPATIENT
Start: 2021-05-21 | End: 2021-06-25 | Stop reason: SDUPTHER

## 2021-05-21 ASSESSMENT — ENCOUNTER SYMPTOMS
VOMITING: 0
COLOR CHANGE: 0
CHEST TIGHTNESS: 0
NAUSEA: 0
ABDOMINAL PAIN: 0
SHORTNESS OF BREATH: 0
BACK PAIN: 0

## 2021-05-21 NOTE — PROGRESS NOTES
2021    TELEHEALTH EVALUATION -- Audio/Visual (During [facility]KP[facility]-66 public health emergency)    HPI:    Ermelinda Marcial (:  1990) has requested an audio/video evaluation for the following concern(s):    F/u bipolar:  Kaushik Marin has a long standing hx of bipolar disorder  Distant hx of substance abuse  He followed with psychiatry for some time  He recently lost insurance, and then returned to our office for support  He has had multiple lengthy medication trials in the past  He has followed with Dr. Breanna Dobson intermittently since establishing at this practive  He saw Chiqui West NP, on 21 and was started on Sesar Stabs at the time    Since, he reports:   He stopped taking Xanax previously prescribed by his previous psychiatrist and does NOT want to trial any benzodiazepines in the future  He does not notice too much of a difference with the Sesar Stabs  His kathie and cycles are quicker     Missed work on the  due to panic episodes  [email or web address]    Review of Systems   Constitutional: Negative for activity change, appetite change, fatigue and unexpected weight change. Respiratory: Negative for chest tightness and shortness of breath. Cardiovascular: Negative for chest pain and palpitations. Gastrointestinal: Negative for abdominal pain, nausea and vomiting. Musculoskeletal: Negative for arthralgias and back pain. Skin: Negative for color change. Neurological: Negative for dizziness, tremors, seizures, weakness, light-headedness, numbness and headaches. Psychiatric/Behavioral: Positive for dysphoric mood and sleep disturbance. Negative for agitation, behavioral problems, confusion, decreased concentration, hallucinations, self-injury and suicidal ideas. The patient is nervous/anxious and is hyperactive. Prior to Visit Medications    Medication Sig Taking?  Authorizing Provider   cariprazine hcl (VRAYLAR) 3 MG CAPS capsule Take 1 capsule by mouth daily Yes Dina Montana MD cloNIDine (CATAPRES) 0.1 MG tablet Take 1 tablet by mouth 2 times daily as needed (anxiety) Yes Philip Ruiz MD       Social History     Tobacco Use    Smoking status: Former Smoker     Packs/day: 1.00     Years: 10.00     Pack years: 10.00     Types: Cigarettes     Quit date: 2013     Years since quittin.5    Smokeless tobacco: Never Used   Substance Use Topics    Alcohol use: No     Comment: occasionally    Drug use: No     Comment: past history of heroin use        Allergies   Allergen Reactions    Cyclobenzaprine Other (See Comments)    Diclofenac Shortness Of Breath    Sulfamethoxazole-Trimethoprim Shortness Of Breath    Sulfa Antibiotics    ,   Past Medical History:   Diagnosis Date    Anxiety     Carpal tunnel syndrome     Depression     Headache     Pneumothorax    ,   Past Surgical History:   Procedure Laterality Date    CHEST TUBE INSERTION     ,   Social History     Tobacco Use    Smoking status: Former Smoker     Packs/day: 1.00     Years: 10.00     Pack years: 10.00     Types: Cigarettes     Quit date: 2013     Years since quittin.5    Smokeless tobacco: Never Used   Substance Use Topics    Alcohol use: No     Comment: occasionally    Drug use: No     Comment: past history of heroin use   ,   Family History   Problem Relation Age of Onset    Diabetes Father     Heart Attack Maternal Grandmother     Heart Disease Maternal Grandmother     Prostate Cancer Maternal Grandfather     Cancer Paternal Adline Crumbly Prostate Cancer Paternal Grandfather    ,   Immunization History   Administered Date(s) Administered    Tdap (Boostrix, Adacel) 2017, 10/16/2018   ,   Health Maintenance   Topic Date Due    Varicella vaccine (1 of 2 - 2-dose childhood series) Never done    COVID-19 Vaccine (1) Never done    Hepatitis B vaccine (1 of 3 - Risk 3-dose series) Never done    Flu vaccine (Season Ended) 2021    DTaP/Tdap/Td vaccine (3 - Td) 10/16/2028    HIV screen  Completed    Hepatitis A vaccine  Aged Out    Hib vaccine  Aged Out    Meningococcal (ACWY) vaccine  Aged Out    Pneumococcal 0-64 years Vaccine  Aged Out       PHYSICAL EXAMINATION:  [ INSTRUCTIONS:  \"[x]\" Indicates a positive item  \"[]\" Indicates a negative item  -- DELETE ALL ITEMS NOT EXAMINED]  Vital Signs: (As obtained by patient/caregiver or practitioner observation)    Blood pressure-  Heart rate-    Respiratory rate-    Temperature-  Pulse oximetry-     Constitutional: [x] Appears well-developed and well-nourished [x] No apparent distress      [] Abnormal-   Mental status  [x] Alert and awake  [] Oriented to person/place/time [x]Able to follow commands      Eyes:  EOM    [x]  Normal  [] Abnormal-  Sclera  [x]  Normal  [] Abnormal -         Discharge []  None visible  [] Abnormal -    HENT:   [x] Normocephalic, atraumatic. [x] Abnormal   [] Mouth/Throat: Mucous membranes are moist.     External Ears [x] Normal  [] Abnormal-     Neck: [x] No visualized mass     Pulmonary/Chest: [x] Respiratory effort normal.  [x] No visualized signs of difficulty breathing or respiratory distress        [] Abnormal-      Musculoskeletal:   [] Normal gait with no signs of ataxia         [x] Normal range of motion of neck        [] Abnormal-       Neurological:        [x] No Facial Asymmetry (Cranial nerve 7 motor function) (limited exam to video visit)          [] No gaze palsy        [] Abnormal-         Skin:        [x] No significant exanthematous lesions or discoloration noted on facial skin         [] Abnormal-            Psychiatric:       [x] Normal Affect [] No Hallucinations        [] Abnormal-     Other pertinent observable physical exam findings-     ASSESSMENT/PLAN:  1. Bipolar 1 disorder, depressed, severe ([facility] [address]  Inc to 3 mg  Had a long discussion about the titration mechanisms with medications needed  - cariprazine hcl (VRAYLAR) 3 MG CAPS capsule;  Take 1 capsule by mouth daily  Dispense: 30 capsule; Refill: 1    2. Severe episode of recurrent major depressive disorder, without psychotic features ([facility] [address]  - cariprazine hcl (VRAYLAR) 3 MG CAPS capsule; Take 1 capsule by mouth daily  Dispense: 30 capsule; Refill: 1    3. Panic disorder (episodic paroxysmal anxiety)  Has not responded in the past to hydroxyzine, Buspar. Did not do well with Xanax while with psychiatry. Will avoid any benzodiazepines in the future. - cloNIDine (CATAPRES) 0.1 MG tablet; Take 1 tablet by mouth 2 times daily as needed (anxiety)  Dispense: 30 tablet; Refill: 0      Return in about 3 weeks (around 6/11/2021) for VV Doxy, VV, my chart. Peter Washington is a 26 y.o. male being evaluated by a Virtual Visit (video visit) encounter to address concerns as mentioned above. A caregiver was present when appropriate. Due to this being a TeleHealth encounter (During [facility] public health emergency), evaluation of the following organ systems was limited: Vitals/Constitutional/EENT/Resp/CV/GI//MS/Neuro/Skin/Heme-Lymph-Imm. Pursuant to the emergency declaration under the [address] [place] [address] authority and the [employer] Act, this Virtual Visit was conducted with patient's (and/or legal guardian's) consent, to reduce the patient's risk of exposure to COVID-19 and provide necessary medical care. The patient (and/or legal guardian) has also been advised to contact this office for worsening conditions or problems, and seek emergency medical treatment and/or call 911 if deemed necessary. Services were provided through a video synchronous discussion virtually to substitute for in-person clinic visit. Patient and provider were located at their individual homes. --Kyree Ward MD on 5/21/2021 at 5:24 PM    An electronic signature was used to authenticate this note.

## 2021-05-21 NOTE — LETTER
[address]  [facility] [address]  Phone: [phone]  Fax: [phone]    Griffin Flores MD        May 21, 2021     Patient: Red Dawson   YOB: 1990   Date of Visit: 5/21/2021       To Whom It May Concern: It is my medical opinion that Linda Joyce was unable to fulfill his work obligations on 5/19 and 5/20 due to ongoing health concerns. Please excuse his absences on those days. If you have any questions or concerns, please don't hesitate to call.     Sincerely,          Griffin Flores MD

## 2021-05-24 ENCOUNTER — TELEPHONE (OUTPATIENT)
Dept: FAMILY MEDICINE CLINIC | Age: 31
End: 2021-05-24

## 2021-05-24 NOTE — TELEPHONE ENCOUNTER
I called [address] and spoke [place]. She states that the Vraylar needs a Prior Authorization. Insurance will cover Risperidone and Seroquel  Do you want to try a PA ?

## 2021-05-24 NOTE — TELEPHONE ENCOUNTER
----- Message from Tino Villa sent at 5/21/2021  5:28 PM EDT -----  Subject: Message to Provider    QUESTIONS  Information for Provider? Pharmacy calling regarding prescription for   Robyn Nava, please advise.  ---------------------------------------------------------------------------  --------------  CALL BACK INFO  What is the best way for the office to contact you? Do not leave any   message, patient will call back for answer, OK to respond with electronic   message via [employer] portal (only for patients who have registered [employer]   account)  Preferred Call Back Phone Number? [phone]  ---------------------------------------------------------------------------  --------------  SCRIPT ANSWERS  Relationship to Patient? Third Party  Representative Name?  Pharmacy- [facility]

## 2021-05-25 NOTE — TELEPHONE ENCOUNTER
Submitted PA for Vraylar 3MG capsules, Key: [id number]. Medication has been APPROVED through 05/25/2022. Please notify patient. Thank you.

## 2021-05-27 ENCOUNTER — TELEPHONE (OUTPATIENT)
Dept: FAMILY MEDICINE CLINIC | Age: 31
End: 2021-05-27

## 2021-05-27 NOTE — LETTER
[address]  [facility] [address]  Phone: [phone]  Fax: [phone]    Kunal Carlos MD        May 27, 2021     Patient: Rick Vicente   YOB: 1990   Date of Visit: 5/27/2021       To Whom It May Concern: It is my medical opinion that Jeana Henriquez should be excused from work today, 5/27/2021, due to health concerns. If you have any questions or concerns, please don't hesitate to call.     Sincerely,        Kunal Carlos MD

## 2021-05-27 NOTE — TELEPHONE ENCOUNTER
Patient called in and states today while at work he had a panic attack. States he starting sweating, his heart started pounding really fast and he had to leave work. He states while on the way home from work he even started crying which is out of character for him. He wants to know if he can get a work note for missing. Doctors note, email too [email or web address]    States he went home and fell asleep and woke up feeling better.  He has not started his increase dosage of the Jeanerette Elsie yet he said he will start that tonight

## 2021-05-28 NOTE — TELEPHONE ENCOUNTER
Spoke to pt and he wanted to inform Dr. aLzaro Kelly that he had another pain attack at 1 am and had all the side effects as yesterday but this time he stated that he had vomiting as well. Pt was also wondering if Dr. Lazaro Kelly would be able to write another letter for his work for today as well to the same email.  Please Advise

## 2021-05-28 NOTE — TELEPHONE ENCOUNTER
Note written. Any more note requests and I need to see him in office to maximize his treatment plan. Please let him know, email letter, and may sure he increased his vraylar dosage. Thank you.

## 2021-06-23 DIAGNOSIS — F41.0 PANIC DISORDER (EPISODIC PAROXYSMAL ANXIETY): ICD-10-CM

## 2021-06-23 DIAGNOSIS — L40.0 PLAQUE PSORIASIS: ICD-10-CM

## 2021-06-23 DIAGNOSIS — F33.2 SEVERE EPISODE OF RECURRENT MAJOR DEPRESSIVE DISORDER, WITHOUT PSYCHOTIC FEATURES (HCC): ICD-10-CM

## 2021-06-23 DIAGNOSIS — F31.4 BIPOLAR 1 DISORDER, DEPRESSED, SEVERE (HCC): ICD-10-CM

## 2021-06-23 NOTE — TELEPHONE ENCOUNTER
Refill Request     Last Seen: Last Seen Department: 5/21/2021  Last Seen by PCP: 5/21/2021    Last Written: 05/21/2021-04/23/2020    Next Appointment:   Future Appointments   Date Time Provider Adonis Louise   6/24/2021  5:00 PM MD GAYATRI Early  [place] [facility]       Future appointment scheduled      Requested Prescriptions     Pending Prescriptions Disp Refills    cloNIDine (CATAPRES) 0.1 MG tablet 30 tablet 0     Sig: Take 1 tablet by mouth 2 times daily as needed (anxiety)    cariprazine hcl (VRAYLAR) 3 MG CAPS capsule 30 capsule 1     Sig: Take 1 capsule by mouth daily    clobetasol propionate 0.05 % LOTN lotion 236 mL 1     Sig: Apply 1 Applicatorful topically 2 times daily for 14 days

## 2021-06-25 RX ORDER — CLOBETASOL PROPIONATE 0.5 MG/ML
1 LOTION TOPICAL 2 TIMES DAILY
Qty: 236 ML | Refills: 1 | Status: SHIPPED | OUTPATIENT
Start: 2021-06-25 | End: 2021-07-09

## 2021-06-25 RX ORDER — CLONIDINE HYDROCHLORIDE 0.1 MG/1
0.1 TABLET ORAL 2 TIMES DAILY PRN
Qty: 30 TABLET | Refills: 0 | Status: SHIPPED | OUTPATIENT
Start: 2021-06-25 | End: 2021-07-12

## 2021-06-25 NOTE — TELEPHONE ENCOUNTER
Pt had to cancel apt yesterday with Dr. Howard Berrios due to a family emergency and was calling to reschedule. Pt is out of his medication, is there a spot next week where pt could be seen?  Please Advise

## 2021-07-02 ENCOUNTER — TELEPHONE (OUTPATIENT)
Dept: FAMILY MEDICINE CLINIC | Age: 31
End: 2021-07-02

## 2021-07-12 ENCOUNTER — VIRTUAL VISIT (OUTPATIENT)
Dept: FAMILY MEDICINE CLINIC | Age: 31
End: 2021-07-12
Payer: COMMERCIAL

## 2021-07-12 DIAGNOSIS — F31.75 BIPOLAR 1 DISORDER, DEPRESSED, PARTIAL REMISSION (HCC): Primary | ICD-10-CM

## 2021-07-12 DIAGNOSIS — F41.0 PANIC DISORDER (EPISODIC PAROXYSMAL ANXIETY): ICD-10-CM

## 2021-07-12 PROCEDURE — 99214 OFFICE O/P EST MOD 30 MIN: CPT | Performed by: FAMILY MEDICINE

## 2021-07-12 RX ORDER — PROPRANOLOL HYDROCHLORIDE 10 MG/1
10-20 TABLET ORAL 2 TIMES DAILY PRN
Qty: 60 TABLET | Refills: 0 | Status: SHIPPED | OUTPATIENT
Start: 2021-07-12 | End: 2022-09-27 | Stop reason: ALTCHOICE

## 2021-07-12 ASSESSMENT — ENCOUNTER SYMPTOMS
SHORTNESS OF BREATH: 0
CHEST TIGHTNESS: 0
BACK PAIN: 0
ABDOMINAL PAIN: 0
VOMITING: 0
COLOR CHANGE: 0
NAUSEA: 0

## 2021-07-12 NOTE — PROGRESS NOTES
Chika Wu (:  1990) is a 26 y.o. male,Established patient, here for evaluation of the following chief complaint(s): No chief complaint on file. ASSESSMENT/PLAN:  1. Bipolar 1 disorder, depressed, partial remission ([facility] [address]  C/w Vraylar at 3 mg, c/w new appmnt with new therapist    2. Panic disorder (episodic paroxysmal anxiety)  -     propranolol (INDERAL) 10 MG tablet; Take 1-2 tablets by mouth 2 times daily as needed (anxiety/panic), Disp-60 tablet, R-0Normal  Monitor BP/HR. Update me via [employer] in 2 weeks  See me in 3-6 mo if stable/improving    He still have not updated his HIPPA, unable to discuss health concerns with his wife. Return in about 3 months (around 10/12/2021). SUBJECTIVE/OBJECTIVE:  HPI    F/u moods/medications  Long standing hx of bipolar disorder, I  Had severe depression  Has tried multiple mood stabilizers in the past    Was started on Vraylar and we increased the dosage at his last visit in   He feels \"like this is the only medication where he is getting any relief\"  Still has manic episodes, but they \"are easier to come down from,\" and feels his depression has improved  Still experiences panic episodes  Has tried Bupar (maximum dosage for 1 year without improvement), Xanax (did not like the experience), Hydroxyzine (did not help), Clonidine (made him too tired). Seeing a therapist, has an appmnt at a place in [place]  In   No SI/HI    Review of Systems   Constitutional: Negative for activity change, appetite change, fatigue and unexpected weight change. Respiratory: Negative for chest tightness and shortness of breath. Cardiovascular: Negative for chest pain and palpitations. Gastrointestinal: Negative for abdominal pain, nausea and vomiting. Musculoskeletal: Negative for arthralgias and back pain. Skin: Negative for color change. Neurological: Negative for dizziness, tremors, seizures, weakness, light-headedness, numbness and headaches. Psychiatric/Behavioral: Negative for agitation, behavioral problems, confusion, decreased concentration, dysphoric mood, hallucinations, self-injury, sleep disturbance and suicidal ideas. The patient is nervous/anxious. The patient is not hyperactive. No flowsheet data found.      Physical Exam    [INSTRUCTIONS:  \"[x]\" Indicates a positive item  \"[]\" Indicates a negative item  -- DELETE ALL ITEMS NOT EXAMINED]    Constitutional: [x] Appears well-developed and well-nourished [x] No apparent distress      [] Abnormal -     Mental status: [x] Alert and awake  [x] Oriented to person/place/time [x] Able to follow commands    [] Abnormal -     Eyes:   EOM    [x]  Normal    [] Abnormal -   Sclera  [x]  Normal    [] Abnormal -          Discharge [x]  None visible   [] Abnormal -     HENT: [x] Normocephalic, atraumatic  [] Abnormal -   [x] Mouth/Throat: Mucous membranes are moist    External Ears [x] Normal  [] Abnormal -    Neck: [x] No visualized mass [] Abnormal -     Pulmonary/Chest: [x] Respiratory effort normal   [x] No visualized signs of difficulty breathing or respiratory distress        [] Abnormal -      Musculoskeletal:   [x] Normal gait with no signs of ataxia         [x] Normal range of motion of neck        [] Abnormal -     Neurological:        [x] No Facial Asymmetry (Cranial nerve 7 motor function) (limited exam due to video visit)          [x] No gaze palsy        [] Abnormal -          Skin:        [x] No significant exanthematous lesions or discoloration noted on facial skin         [] Abnormal -            Psychiatric:       [x] Normal Affect [] Abnormal -        [x] No Hallucinations    Other pertinent observable physical exam findings:-          On this date 7/12/2021 I have spent 30 minutes reviewing previous notes, test results and face to face (virtual) with the patient discussing the diagnosis and importance of compliance with the treatment plan as well as documenting on the day of the visitAshlee Edwards [place], was evaluated through a synchronous (real-time) audio-video encounter. The patient (or guardian if applicable) is aware that this is a billable service. Verbal consent to proceed has been obtained within the past 12 months. The visit was conducted pursuant to the emergency declaration under the [address] [place] [address] and the [employer] Act. Patient identification was verified, and a caregiver was present when appropriate. The patient was located in a state where the provider was credentialed to provide care. An electronic signature was used to authenticate this note.     --Maida Ronquillo MD

## 2021-08-03 ENCOUNTER — TELEPHONE (OUTPATIENT)
Dept: FAMILY MEDICINE CLINIC | Age: 31
End: 2021-08-03

## 2021-08-03 NOTE — TELEPHONE ENCOUNTER
Letter done. Please print for patient or as requested. Any questions, let me know.  Thanks, Chela Shook

## 2021-08-03 NOTE — TELEPHONE ENCOUNTER
----- Message from Alek Dolan MA sent at 8/3/2021  8:37 AM EDT -----  Subject: Appointment Request    Reason for Call: Urgent (Patient Request) ED Follow Up Visit    QUESTIONS  Type of Appointment? Established Patient  Reason for appointment request? Available appointments did not meet   patient need  Additional Information for Provider? Patient states he was prescribed a   new anxiety medication and had adverse reaction poring sweat,   palpitations, upset stomach, weak. Went to [facility] 8/3/21, request   an appointment as soon as possible. Left work early yesterday and today   will need a note.   ---------------------------------------------------------------------------  --------------  CALL BACK INFO  What is the best way for the office to contact you? OK to leave message on   Piggybackr, OK to respond with electronic message via [employer] portal (only   for patients who have registered [employer] account)  Preferred Call Back Phone Number? [phone]  ---------------------------------------------------------------------------  --------------  SCRIPT ANSWERS  Relationship to Patient? Self  (Patient requests to see provider urgently. )? Yes  Do you have any questions for your primary care provider that need to be   answered prior to your appointment? No  Have you been diagnosed with, awaiting test results for, or told that you   are suspected of having COVID-19 (Coronavirus)? (If patient has tested   negative or was tested as a requirement for work, school, or travel and   not based on symptoms, answer no)? No  Do you currently have flu-like symptoms including fever or chills, cough,   shortness of breath, difficulty breathing, or new loss of taste or smell? No  Have you had close contact with someone with COVID-19 in the last 14 days? No  (Service Expert  click yes below to proceed with VISEO As Usual   Scheduling)?  Yes

## 2021-08-03 NOTE — TELEPHONE ENCOUNTER
I called patient and I have him scheduled to see Dr. Ralph Yu on 8/5/2021 at 5 pm. He is asking if he can get a doctors note until he is seen on Thursday. He states that he took the propanolol on Sunday for the first time and felt weird, but took again yesterday. He ended up having a full blown panic attack and Chest pain and that is how he ended up in the ER. Please see lizeth where for ED visit notes.

## 2021-08-04 NOTE — TELEPHONE ENCOUNTER
Left message for patient to call back. Please move his appointment to 11:45 am tomorrow 8/4/2021.  Dr. Kelli Arroyo has a meeting and cannot see him tomorrow at 5 pm.

## 2021-08-05 ENCOUNTER — TELEPHONE (OUTPATIENT)
Dept: FAMILY MEDICINE CLINIC | Age: 31
End: 2021-08-05

## 2021-09-01 DIAGNOSIS — F31.4 BIPOLAR 1 DISORDER, DEPRESSED, SEVERE (HCC): ICD-10-CM

## 2021-09-01 DIAGNOSIS — F33.2 SEVERE EPISODE OF RECURRENT MAJOR DEPRESSIVE DISORDER, WITHOUT PSYCHOTIC FEATURES (HCC): ICD-10-CM

## 2021-09-01 NOTE — TELEPHONE ENCOUNTER
Refill Request     Last Seen: Last Seen Department: 7/12/2021  Last Seen by PCP: 7/12/2021    Last Written: 6/25/2021    Next Appointment:   No future appointments.       Requested Prescriptions      No prescriptions requested or ordered in this encounter

## 2021-09-01 NOTE — TELEPHONE ENCOUNTER
----- Message from Dave Mortensen sent at 9/1/2021 11:52 AM EDT -----  Subject: Refill Request    QUESTIONS  Name of Medication? cariprazine hcl (VRAYLAR) 3 MG CAPS capsule  Patient-reported dosage and instructions? 3 MG   How many days do you have left? 5  Preferred Pharmacy? [facility]  Pharmacy phone number (if available)? [phone]  ---------------------------------------------------------------------------  --------------  CALL BACK INFO  What is the best way for the office to contact you? OK to leave message on   voicemail  Preferred Call Back Phone Number?  [phone]

## 2021-09-07 ENCOUNTER — TELEPHONE (OUTPATIENT)
Dept: FAMILY MEDICINE CLINIC | Age: 31
End: 2021-09-07

## 2021-09-09 NOTE — TELEPHONE ENCOUNTER
Pt calling stating that he doesn't have insurance anymore, would Dr. Ana Linda be able to call in the generic of that medication.

## 2021-09-10 NOTE — TELEPHONE ENCOUNTER
I have submitted a request to [employer] for samples. I have also found a patient assistance program that he may qualify for through [employer]. Called patient regarding medication. Patient stated that he lost his job but has a coupon code that states it will cover his med for $15 and 2 free refills. I informed him to double check the terms and conditions on the coupon as some require insurance in order to be eligible to use. Patient states that he has and believes it should cover him. He just wanted to make sure that he receives this medication as it  has been working for him and he doesn't want to change. I informed him [facility], however he needs it sent to the [facility] in [place]. I have corrected the pharmacy and pended the script to be resent.   I told the patient that if he has any issues to call the office back and we can move forward with the assistance program.     Electronically signed by Pat Haley RN on 9/10/2021 at 10:06 AM

## 2021-09-27 NOTE — TELEPHONE ENCOUNTER
Patient called office regarding Anali Osman. He thought the coupon he had would cover the medication but since he doesn't have insurance at this time it would be $1500 for the medication. He says he has found a new job and he is waiting for the insurance to kick in but he only has 12 days left of his medication. I found a form to request samples. I have printed and given to PCP to sign. Will fax to company. They will provide 8 boxes with 7 capsules per box. Hopefully that will be enough to get patient through till his insurance kicks in from his new job.      Electronically signed by Catracho Fernandez RN on 9/27/2021 at 2:14 PM

## 2021-09-29 NOTE — TELEPHONE ENCOUNTER
Called patient, no answer, left message informing we have submitted for samples and hopefully they will be here before he is out of medication. Stated we will be in touch once samples are available ut if he has any questions or needs anything before then to call the office.     Electronically signed by Balbir Regalado RN on 9/29/2021 at 2:46 PM

## 2021-10-05 ENCOUNTER — TELEPHONE (OUTPATIENT)
Dept: FAMILY MEDICINE CLINIC | Age: 31
End: 2021-10-05

## 2021-10-05 NOTE — TELEPHONE ENCOUNTER
Received email that patient samples have been shipped to our office.  Tracking number is [id number]

## 2021-10-06 ENCOUNTER — TELEPHONE (OUTPATIENT)
Dept: FAMILY MEDICINE CLINIC | Age: 31
End: 2021-10-06

## 2021-10-06 NOTE — TELEPHONE ENCOUNTER
Spoke with patient, informed medication samples are available for . He will come to office tomorrow to p/u.     Electronically signed by Vishal Alston RN on 10/6/2021 at 10:04 AM

## 2021-10-11 ENCOUNTER — TELEPHONE (OUTPATIENT)
Dept: FAMILY MEDICINE CLINIC | Age: 31
End: 2021-10-11

## 2021-10-11 NOTE — TELEPHONE ENCOUNTER
Am I allowed to mail medication samples? ? Or does he need to wait until he is done quarantining to be able to ?

## 2021-10-11 NOTE — TELEPHONE ENCOUNTER
----- Message from VIA [facility]P sent at 10/11/2021  2:27 PM EDT -----  Subject: Medication Problem    QUESTIONS  Name of Medication? cariprazine hcl (VRAYLAR) 3 MG CAPS capsule  Patient-reported dosage and instructions? 3 mg 1 capsule 1x per day  What question or problem do you have with the medication? Patients child   tested positive for covid. Patient asked if the medication can be mailed   to him due to him being in quarantine. Preferred Pharmacy? [facility], [address] [place] [address] [address] [phone]  Pharmacy phone number (if available)? [phone]  Additional Information for Provider?   ---------------------------------------------------------------------------  --------------  [address]  What is the best way for the office to contact you? OK to leave message on   voicemail  Preferred Call Back Phone Number? [phone]  ---------------------------------------------------------------------------  --------------  SCRIPT ANSWERS  Relationship to Patient?  Self

## 2021-10-13 NOTE — TELEPHONE ENCOUNTER
Called patient, informed we can not mail samples. Patient will come to our building and call the office once he arrives, from there I will take the medications out to him. Patient agreeable to plan for pickup and will wear a mask.     Electronically signed by Bo Love RN on 10/13/2021 at 10:38 AM

## 2021-12-03 ENCOUNTER — TELEPHONE (OUTPATIENT)
Dept: FAMILY MEDICINE CLINIC | Age: 31
End: 2021-12-03

## 2021-12-03 NOTE — TELEPHONE ENCOUNTER
----- Message from Maximo Krause sent at 12/3/2021 11:34 AM EST -----  Subject: Message to Provider    QUESTIONS  Information for Provider? pt is wanting to check and see if his pcp has   any samples for brayler. he is waiting for his insurance to kick in with   his new job.   ---------------------------------------------------------------------------  --------------  [address]  What is the best way for the office to contact you? OK to leave message on   voicemail  Preferred Call Back Phone Number? [phone]  ---------------------------------------------------------------------------  --------------  SCRIPT ANSWERS  Relationship to Patient?  Self

## 2021-12-06 NOTE — TELEPHONE ENCOUNTER
Spoke with patient. He states that he was working for [employer] but wasn't making enough so he left and started another new job. Patient says the new job's Insurance starts in 90 days and just needs samples of Vraylar one more time to make it through. Informed patient samples are not something we do regularly and this would be the last time I could get him some. If he is unable to get insurance coverage in the future, we will have to come up with another plan for  Getting him this medication per office policy. I have filled out sample request form. Will give to PCP for sign off and let patient know once samples arrive.

## 2021-12-13 ENCOUNTER — TELEPHONE (OUTPATIENT)
Dept: FAMILY MEDICINE CLINIC | Age: 31
End: 2021-12-13

## 2021-12-14 NOTE — TELEPHONE ENCOUNTER
Called patient, left message informing him request has been faxed. Just waiting for shipment to come in.

## 2021-12-22 NOTE — TELEPHONE ENCOUNTER
Called patient back. No answer. Left message informing him im not sure why the samples have not arrived yet. Only thing I can think of is maybe the holidays causing a back up in shipping. Form was faxed and received confirmation of successful sending. No number to call and check on status only fax number available.

## 2021-12-28 NOTE — TELEPHONE ENCOUNTER
Received medication, patient called and informed this AM. Patient came into office to  samples. Shipping form and pass through med log sheet placed on PCP desk for signature.

## 2021-12-30 ENCOUNTER — APPOINTMENT (OUTPATIENT)
Dept: GENERAL RADIOLOGY | Age: 31
End: 2021-12-30

## 2021-12-30 ENCOUNTER — HOSPITAL ENCOUNTER (EMERGENCY)
Age: 31
Discharge: HOME | End: 2021-12-30
Attending: EMERGENCY MEDICINE

## 2021-12-30 VITALS
TEMPERATURE: 98 F | RESPIRATION RATE: 18 BRPM | OXYGEN SATURATION: 97 % | HEART RATE: 75 BPM | SYSTOLIC BLOOD PRESSURE: 150 MMHG | DIASTOLIC BLOOD PRESSURE: 95 MMHG

## 2021-12-30 DIAGNOSIS — S52.124A CLOSED NONDISPLACED FRACTURE OF HEAD OF RIGHT RADIUS, INITIAL ENCOUNTER: Primary | ICD-10-CM

## 2021-12-30 PROCEDURE — 73080 X-RAY EXAM OF ELBOW: CPT

## 2021-12-30 PROCEDURE — 99282 EMERGENCY DEPT VISIT SF MDM: CPT

## 2021-12-30 PROCEDURE — 29125 APPL SHORT ARM SPLINT STATIC: CPT

## 2021-12-30 PROCEDURE — 6370000000 HC RX 637 (ALT 250 FOR IP): Performed by: EMERGENCY MEDICINE

## 2021-12-30 PROCEDURE — 73110 X-RAY EXAM OF WRIST: CPT

## 2021-12-30 RX ORDER — ACETAMINOPHEN 325 MG/1
650 TABLET ORAL ONCE
Status: COMPLETED | OUTPATIENT
Start: 2021-12-30 | End: 2021-12-30

## 2021-12-30 RX ADMIN — ACETAMINOPHEN 650 MG: 325 TABLET ORAL at 14:14

## 2021-12-30 ASSESSMENT — PAIN SCALES - GENERAL
PAINLEVEL_OUTOF10: 7
PAINLEVEL_OUTOF10: 7

## 2021-12-30 NOTE — ED PROVIDER NOTES
[address] [facility]  ED  EMERGENCY DEPARTMENT ENCOUNTER      Pt Name: Juan Sanders  MRN: [id number]  [place] 1990  Date of evaluation: 12/30/2021  Provider: Buddy Westfall MD    CHIEF COMPLAINT       Chief Complaint   Patient presents with    Fall     pt was playing football a few days ago and fell landing on his right arm  pain in elbow to wrist.          HISTORY OF PRESENT ILLNESS   (Location/Symptom, Timing/Onset, Context/Setting, Quality, Duration, Modifying Factors, Severity)  Note limiting factors. Juan Snaders is a 33 y.o. male with past medical history of no significant illness here today with right elbow pain after fall. Patient states a few days ago he was playing football with his girlfriends son when he fell landed on her right outstretched arm and has been having pain in the right wrist and right elbow since that time. Limited range of motion of the right elbow secondary to pain. Mild associated swelling. Throbbing aching discomfort made worse with any range of motion with no obvious alleviating factors. HPI    Nursing Notes were reviewed. REVIEW OF SYSTEMS    (2-9 systems for level 4, 10 or more for level 5)     Review of Systems    Please see HPI for pertinent positive and negative review of system findings. A full 10 system ROS was performed and otherwise negative. PAST MEDICAL HISTORY     Past Medical History:   Diagnosis Date    Anxiety     Carpal tunnel syndrome     Depression     Headache     Pneumothorax          SURGICAL HISTORY       Past Surgical History:   Procedure Laterality Date    CHEST TUBE INSERTION           CURRENT MEDICATIONS       Discharge Medication List as of 12/30/2021  2:50 PM      CONTINUE these medications which have NOT CHANGED    Details   !! cariprazine hcl (VRAYLAR) 3 MG CAPS capsule Take 1 capsule by mouth daily, Disp-30 capsule, R-2Please provide cheapest, generic version possible.  Thank youNormal      !! cariprazine hcl (VRAYLAR) 3 MG CAPS capsule Take 1 capsule by mouth daily, Disp-30 capsule, R-1Normal      propranolol (INDERAL) 10 MG tablet Take 1-2 tablets by mouth 2 times daily as needed (anxiety/panic), Disp-60 tablet, R-0Normal       !! - Potential duplicate medications found. Please discuss with provider. ALLERGIES     Cyclobenzaprine, Diclofenac, Sulfamethoxazole-trimethoprim, and Sulfa antibiotics    FAMILY HISTORY       Family History   Problem Relation Age of Onset    Diabetes Father     Heart Attack Maternal Grandmother     Heart Disease Maternal Grandmother     Prostate Cancer Maternal Grandfather     Cancer Paternal Grandfather     Prostate Cancer Paternal Grandfather           SOCIAL HISTORY       Social History     Socioeconomic History    Marital status:      Spouse name: None    Number of children: None    Years of education: None    Highest education level: None   Occupational History    None   Tobacco Use    Smoking status: Former Smoker     Packs/day: 1.00     Years: 10.00     Pack years: 10.00     Types: Cigarettes     Quit date: 2013     Years since quittin.1    Smokeless tobacco: Never Used   Substance and Sexual Activity    Alcohol use: No     Comment: occasionally    Drug use: No     Comment: past history of heroin use    Sexual activity: Yes     Partners: Female   Other Topics Concern    None   Social History Narrative    Works as a  at [employer] a 3year old girl    Good relationship with  who is a      Social Determinants of Health     Financial Resource Strain:     Difficulty of Paying Living Expenses: Not on file   Food Insecurity:     Worried About [address] in the Last Year: Not on file    [address] in the Last Year: Not on file   Transportation Needs:     Lack of Transportation (Medical): Not on file    Lack of Transportation (Non-Medical):  Not on file   Physical Activity:     Days of Exercise per Week: Not on file    Minutes of Exercise per Session: Not on file   Stress:     Feeling of Stress : Not on file   Social Connections:     Frequency of Communication with Friends and Family: Not on file    Frequency of Social Gatherings with Friends and Family: Not on file    Attends Buddhist Services: Not on file    Active Member of [address] [place] [address] or Organizations: Not on file    Attends Club or Organization Meetings: Not on file    Marital Status: Not on file   Intimate Partner Violence:     Fear of Current or Ex-Partner: Not on file    Emotionally Abused: Not on file    Physically Abused: Not on file    Sexually Abused: Not on file   Housing Stability:     Unable to Pay for Housing in the Last Year: Not on file    Number of [place] in the Last Year: Not on file    Unstable Housing in the Last Year: Not on file       SCREENINGS               PHYSICAL EXAM    (up to 7 for level 4, 8 or more for level 5)     ED Triage Vitals   BP Temp Temp src Pulse Resp SpO2 Height Weight   12/30/21 1350 12/30/21 1345 -- 12/30/21 1345 12/30/21 1345 12/30/21 1345 -- --   (!) 150/95 98 °F (36.7 °C)  75 18 97 %         Physical Exam      General appearance:  Cooperative. No acute distress. Skin:  Warm. Dry. Ears, nose, mouth and throat:  Oral mucosa moist,  Perfusion:  intact with 2+ right radial pulse  Respiratory: Respirations nonlabored. Neurological:  Alert. Moves all extremities spontaneously. Intact sensation throughout all digits of the right hand and all dermatomes in the right upper extremity  Musculoskeletal:   Mild soft tissue swelling of the right elbow with tenderness to palpation of the proximal forearm. Patient cannot fully extend or supinate the hand. Normal range of motion of the right shoulder.  Normal range of motion of the right wrist with no gross tenderness to palpation of the wrist. Normal range of motion with flexion and extension of all digits of the right hand          Psychiatric:  Normal mood      DIAGNOSTIC RESULTS       Labs Reviewed - No data to display    Interpretation per the Radiologist below, if obtained/available at the time of this note:    XR WRIST RIGHT (MIN 3 VIEWS)   Final Result   No acute osseous abnormality. XR ELBOW RIGHT (MIN 3 VIEWS)   Final Result   Equivocal for nondisplaced fracture of the radial head and joint effusion. RECOMMENDATION:   Repeat series is recommended in 7-10 days. All other labs/imaging were within normal range or not returned as of this dictation. EMERGENCY DEPARTMENT COURSE and DIFFERENTIAL DIAGNOSIS/MDM:   Vitals:    Vitals:    12/30/21 1345 12/30/21 1350   BP:  (!) 150/95   Pulse: 75    Resp: 18    Temp: 98 °F (36.7 °C)    SpO2: 97%        Patient presents emergency department today primarily with right elbow pain and right wrist discomfort. X-rays concerning for right proximal radial head fracture. Placed in a posterior long-arm splint will be given outpatient orthopedic follow-up. No other signs of any other trauma or injury    MDM    CONSULTS     None    Critical Care:   None    REASSESSMENT          PROCEDURE     Unless otherwise noted below, none     Splint Application    Date/Time: 12/31/2021 10:16 PM  Performed by: Ga Zavala MD  Authorized by: Ga Zavala MD     Consent:     Consent obtained:  Verbal    Consent given by:  Patient    Risks discussed:  Discoloration, numbness and pain  Pre-procedure details:     Sensation:  Normal  Procedure details:     Laterality:  Right    Location:  Elbow    Elbow:  R elbow    Splint type:  Long arm    Supplies:  Ortho-Glass  Post-procedure details:     Pain:  Improved    Sensation:  Normal    Patient tolerance of procedure: Tolerated well, no immediate complications          FINAL IMPRESSION      1.  Closed nondisplaced fracture of head of right radius, initial encounter            DISPOSITION/PLAN   DISPOSITION Decision To Discharge 12/30/2021 02:38:02 PM        PATIENT REFERRED TO:  Lynnette Doss MD  [place] [address] [address]  [phone]    Schedule an appointment as soon as possible for a visit   Please call to schedule outpatient orthopedic follow up      DISCHARGE MEDICATIONS:  Discharge Medication List as of 12/30/2021  2:50 PM        Controlled Substances Monitoring:     No flowsheet data found.     (Please note that portions of this note were completed with a voice recognition program.  Efforts were made to edit the dictations but occasionally words are mis-transcribed.)    Safia Rodriguez MD (electronically signed)  Attending Emergency Physician            Catarino Mack MD  12/31/21 [id number]

## 2022-01-14 ENCOUNTER — TELEPHONE (OUTPATIENT)
Dept: FAMILY MEDICINE CLINIC | Age: 32
End: 2022-01-14

## 2022-01-14 NOTE — LETTER
[address] [facility] [address]  [facility] [address]  Phone: [phone]  Fax: [phone]    Karen Bruner MD        January 14, 2022     Patient: Juan Sanders   YOB: 1990   Date of Visit: 1/14/2022       To Whom It May Concern: It is my medical opinion that Priscila Amos may return to full duty immediately with no restrictions. If you have any questions or concerns, please don't hesitate to call.     Sincerely,    Karen Bruner MD

## 2022-01-14 NOTE — TELEPHONE ENCOUNTER
----- Message from Fidelinan Kayser sent at 1/14/2022 11:44 AM EST -----  Subject: Message to Provider    QUESTIONS  Information for Provider? Marleny Hernandez is requesting a doctors note for work, as   he missed a few days of work due to a manic episode. He states he is   bipolar and that his work wants a doctors note to clear him to go back to   work.  ---------------------------------------------------------------------------  --------------  Maggie Burger INFO  What is the best way for the office to contact you? OK to leave message on   voicemail  Preferred Call Back Phone Number? [phone]  ---------------------------------------------------------------------------  --------------  SCRIPT ANSWERS  Relationship to Patient?  Self

## 2022-03-22 ENCOUNTER — APPOINTMENT (OUTPATIENT)
Dept: GENERAL RADIOLOGY | Age: 32
End: 2022-03-22

## 2022-03-22 ENCOUNTER — HOSPITAL ENCOUNTER (EMERGENCY)
Age: 32
Discharge: HOME | End: 2022-03-22

## 2022-03-22 VITALS
WEIGHT: 190 LBS | DIASTOLIC BLOOD PRESSURE: 92 MMHG | SYSTOLIC BLOOD PRESSURE: 142 MMHG | RESPIRATION RATE: 18 BRPM | OXYGEN SATURATION: 98 % | BODY MASS INDEX: 25.77 KG/M2 | HEART RATE: 75 BPM | TEMPERATURE: 99.7 F

## 2022-03-22 DIAGNOSIS — R05.9 COUGH: Primary | ICD-10-CM

## 2022-03-22 LAB
ANION GAP SERPL CALCULATED.3IONS-SCNC: 11 MMOL/L (ref 3–16)
BASOPHILS ABSOLUTE: 0 K/UL (ref 0–0.2)
BASOPHILS RELATIVE PERCENT: 0.7 %
BUN BLDV-MCNC: 14 MG/DL (ref 7–20)
CALCIUM SERPL-MCNC: 8.3 MG/DL (ref 8.3–10.6)
CHLORIDE BLD-SCNC: 101 MMOL/L (ref 99–110)
CO2: 24 MMOL/L (ref 21–32)
CREAT SERPL-MCNC: 0.8 MG/DL (ref 0.9–1.3)
EOSINOPHILS ABSOLUTE: 0.1 K/UL (ref 0–0.6)
EOSINOPHILS RELATIVE PERCENT: 2.6 %
GFR AFRICAN AMERICAN: >60
GFR NON-AFRICAN AMERICAN: >60
GLUCOSE BLD-MCNC: 89 MG/DL (ref 70–99)
HCT VFR BLD CALC: 43.1 % (ref 40.5–52.5)
HEMOGLOBIN: 14.7 G/DL (ref 13.5–17.5)
LYMPHOCYTES ABSOLUTE: 1 K/UL (ref 1–5.1)
LYMPHOCYTES RELATIVE PERCENT: 20.1 %
MCH RBC QN AUTO: 30.3 PG (ref 26–34)
MCHC RBC AUTO-ENTMCNC: 34.2 G/DL (ref 31–36)
MCV RBC AUTO: 88.6 FL (ref 80–100)
MONOCYTES ABSOLUTE: 0.5 K/UL (ref 0–1.3)
MONOCYTES RELATIVE PERCENT: 10.6 %
NEUTROPHILS ABSOLUTE: 3.4 K/UL (ref 1.7–7.7)
NEUTROPHILS RELATIVE PERCENT: 66 %
PDW BLD-RTO: 14.3 % (ref 12.4–15.4)
PLATELET # BLD: 138 K/UL (ref 135–450)
PMV BLD AUTO: 9.1 FL (ref 5–10.5)
POTASSIUM REFLEX MAGNESIUM: 4.5 MMOL/L (ref 3.5–5.1)
RBC # BLD: 4.86 M/UL (ref 4.2–5.9)
SARS-COV-2, NAAT: NOT DETECTED
SODIUM BLD-SCNC: 136 MMOL/L (ref 136–145)
WBC # BLD: 5.2 K/UL (ref 4–11)

## 2022-03-22 PROCEDURE — 87635 SARS-COV-2 COVID-19 AMP PRB: CPT

## 2022-03-22 PROCEDURE — 85025 COMPLETE CBC W/AUTO DIFF WBC: CPT

## 2022-03-22 PROCEDURE — 71046 X-RAY EXAM CHEST 2 VIEWS: CPT

## 2022-03-22 PROCEDURE — 99283 EMERGENCY DEPT VISIT LOW MDM: CPT

## 2022-03-22 PROCEDURE — 80048 BASIC METABOLIC PNL TOTAL CA: CPT

## 2022-03-22 RX ORDER — GUAIFENESIN/DEXTROMETHORPHAN 100-10MG/5
5 SYRUP ORAL 3 TIMES DAILY PRN
Qty: 120 ML | Refills: 0 | Status: SHIPPED | OUTPATIENT
Start: 2022-03-22 | End: 2022-04-01

## 2022-03-22 RX ORDER — AZITHROMYCIN 250 MG/1
250 TABLET, FILM COATED ORAL SEE ADMIN INSTRUCTIONS
Qty: 6 TABLET | Refills: 0 | Status: SHIPPED | OUTPATIENT
Start: 2022-03-22 | End: 2022-03-27

## 2022-03-22 ASSESSMENT — PAIN DESCRIPTION - LOCATION: LOCATION: RIB CAGE

## 2022-03-22 ASSESSMENT — PAIN DESCRIPTION - PAIN TYPE: TYPE: ACUTE PAIN

## 2022-03-22 ASSESSMENT — PAIN SCALES - GENERAL: PAINLEVEL_OUTOF10: 7

## 2022-03-22 ASSESSMENT — PAIN DESCRIPTION - ORIENTATION: ORIENTATION: LEFT

## 2022-03-22 ASSESSMENT — PAIN - FUNCTIONAL ASSESSMENT: PAIN_FUNCTIONAL_ASSESSMENT: 0-10

## 2022-03-22 NOTE — Clinical Note
Qiana Vázquez was seen and treated in our emergency department on 3/22/2022. He may return to work on 03/23/2022. If you have any questions or concerns, please don't hesitate to call.       Ashley Valencia PA-C
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
## 2022-03-22 NOTE — ED NOTES
--Patient provided with discharge instructions and any prescriptions. --Instructions, dosing, and follow-up appointments reviewed with patient/family. No further questions or needs at this time. --Vital signs and patient stable upon discharge. --Patient ambulatory to [facility].        Odell Mei RN  03/22/22 0845

## 2022-03-25 ENCOUNTER — TELEPHONE (OUTPATIENT)
Dept: FAMILY MEDICINE CLINIC | Age: 32
End: 2022-03-25

## 2022-03-25 NOTE — TELEPHONE ENCOUNTER
Spoke with Tommy Valles and informed him we received his patient assistance Vraylar medication at the office. It was delivered to the wrong suite and they brought it over. We received at 3 bottles of 30 qtn, so it will be a six month supply.

## 2022-03-25 NOTE — TELEPHONE ENCOUNTER
----- Message from Nani Han sent at 3/25/2022  1:56 PM EDT -----  Subject: Medication Problem    QUESTIONS  Name of Medication? cariprazine hcl (VRAYLAR) 3 MG CAPS capsule  Patient-reported dosage and instructions? 3 mg capsule  What question or problem do you have with the medication? Patient received   [address] notification that his medication was delivered to the office today   and signed for by someone with the last name Claudene March, He would like to   receive a call back with an update. Preferred Pharmacy? [facility] [address], [address] [phone] - F [phone]  Pharmacy phone number (if available)? [phone]  Additional Information for Provider?   ---------------------------------------------------------------------------  --------------  CALL BACK INFO  What is the best way for the office to contact you? OK to leave message on   voicemail  Preferred Call Back Phone Number? [phone]  ---------------------------------------------------------------------------  --------------  SCRIPT ANSWERS  Relationship to Patient?  Self

## 2022-06-08 NOTE — LETTER
[facility]  ED  [address] [facility] [address]  Phone: [phone]  Fax: [phone]               December 18, 2018    Patient: Zita Birmingham   YOB: 1990   Date of Visit: 12/18/2018       To Whom It May Concern:    Rosita Kilpatrick was seen and treated in our emergency department on 12/18/2018. He may return to work on 12/19/18.       Sincerely,             Signature:__________________________________ Lab Facility: 0 Expected Date Of Service: 06/08/2022 Performing Laboratory: [id number] Billing Type: Third-Party Bill Bill For Surgical Tray: no

## 2022-06-17 ENCOUNTER — TELEPHONE (OUTPATIENT)
Dept: FAMILY MEDICINE CLINIC | Age: 32
End: 2022-06-17

## 2022-06-17 NOTE — TELEPHONE ENCOUNTER
Called patient to inform Vraylar medications are available for him to . No answer, lvm for patient to come in to the office at his earliest convenience to .

## 2022-06-20 ENCOUNTER — TELEPHONE (OUTPATIENT)
Dept: FAMILY MEDICINE CLINIC | Age: 32
End: 2022-06-20

## 2022-06-20 NOTE — TELEPHONE ENCOUNTER
----- Message from Hadley Side sent at 6/20/2022 11:39 AM EDT -----  Subject: Message to Provider    QUESTIONS  Information for Provider? Patient calling in requesting a work slip for   6/20/2022, patient states is [employer] and manic.   ---------------------------------------------------------------------------  --------------  Blas Blocker INFO  What is the best way for the office to contact you? OK to leave message on   voicemail  Preferred Call Back Phone Number? [phone]  ---------------------------------------------------------------------------  --------------  SCRIPT ANSWERS  Relationship to Patient?  Self

## 2022-06-20 NOTE — TELEPHONE ENCOUNTER
Work slip? Need clarification. If it's a work note for missing work, we need to see him, or at least have him complete an evisit or VV.

## 2022-09-16 ENCOUNTER — TELEPHONE (OUTPATIENT)
Dept: FAMILY MEDICINE CLINIC | Age: 32
End: 2022-09-16

## 2022-09-16 NOTE — TELEPHONE ENCOUNTER
Since Dr. Claudine Garcia is part of our practice, he does not need a formal referral to my understanding. Can schedule as able.

## 2022-09-16 NOTE — TELEPHONE ENCOUNTER
Melquiades Siddiqui called the office today to schedule an appointment with Dr. Garner Client. The referral placed for him to establish with her is over a year old. Can you please place a new referral so our office can call Melquiades Siddiqui back and get him scheduled with Dr. Garner Client?

## 2022-09-19 ENCOUNTER — TELEPHONE (OUTPATIENT)
Dept: FAMILY MEDICINE CLINIC | Age: 32
End: 2022-09-19

## 2022-09-19 NOTE — TELEPHONE ENCOUNTER
Left message notifying the patient that he can just call our office to schedule an appt with Dr. Zahida Pina.

## 2022-09-19 NOTE — TELEPHONE ENCOUNTER
Called patient to inform 30 day supply of Mony Dire was received.  Will give to patient at up coming appointment on 9/27

## 2022-09-20 NOTE — TELEPHONE ENCOUNTER
Called patient to inform him that his medication samples are here and available for him to . No answer and unable to leave message. Will try calling back again later.     Electronically signed by Jono Encarnacion RN on 10/6/2021 at 8:24 AM Eucrisa Counseling: Patient may experience a mild burning sensation during topical application. Eucrisa is not approved in children less than 3 months of age.

## 2022-09-27 ENCOUNTER — TELEMEDICINE (OUTPATIENT)
Dept: FAMILY MEDICINE CLINIC | Age: 32
End: 2022-09-27

## 2022-09-27 DIAGNOSIS — M25.522 ARTHRALGIA OF BOTH ELBOWS: ICD-10-CM

## 2022-09-27 DIAGNOSIS — L40.9 PSORIASIS: ICD-10-CM

## 2022-09-27 DIAGNOSIS — F31.4 BIPOLAR 1 DISORDER, DEPRESSED, SEVERE (HCC): Primary | ICD-10-CM

## 2022-09-27 DIAGNOSIS — M25.521 ARTHRALGIA OF BOTH ELBOWS: ICD-10-CM

## 2022-09-27 PROBLEM — R04.2 HEMOPTYSIS: Status: RESOLVED | Noted: 2018-11-16 | Resolved: 2022-09-27

## 2022-09-27 PROBLEM — Z79.899 CHRONIC PRESCRIPTION BENZODIAZEPINE USE: Status: RESOLVED | Noted: 2020-07-10 | Resolved: 2022-09-27

## 2022-09-27 PROCEDURE — 99214 OFFICE O/P EST MOD 30 MIN: CPT | Performed by: FAMILY MEDICINE

## 2022-09-27 SDOH — ECONOMIC STABILITY: FOOD INSECURITY: WITHIN THE PAST 12 MONTHS, THE FOOD YOU BOUGHT JUST DIDN'T LAST AND YOU DIDN'T HAVE MONEY TO GET MORE.: NEVER TRUE

## 2022-09-27 SDOH — ECONOMIC STABILITY: HOUSING INSECURITY
IN THE LAST 12 MONTHS, WAS THERE A TIME WHEN YOU DID NOT HAVE A STEADY PLACE TO SLEEP OR SLEPT IN A SHELTER (INCLUDING NOW)?: NO

## 2022-09-27 SDOH — ECONOMIC STABILITY: FOOD INSECURITY: WITHIN THE PAST 12 MONTHS, YOU WORRIED THAT YOUR FOOD WOULD RUN OUT BEFORE YOU GOT MONEY TO BUY MORE.: NEVER TRUE

## 2022-09-27 SDOH — ECONOMIC STABILITY: INCOME INSECURITY: IN THE LAST 12 MONTHS, WAS THERE A TIME WHEN YOU WERE NOT ABLE TO PAY THE MORTGAGE OR RENT ON TIME?: NO

## 2022-09-27 SDOH — ECONOMIC STABILITY: TRANSPORTATION INSECURITY
IN THE PAST 12 MONTHS, HAS LACK OF TRANSPORTATION KEPT YOU FROM MEETINGS, WORK, OR FROM GETTING THINGS NEEDED FOR DAILY LIVING?: NO

## 2022-09-27 SDOH — ECONOMIC STABILITY: TRANSPORTATION INSECURITY
IN THE PAST 12 MONTHS, HAS THE LACK OF TRANSPORTATION KEPT YOU FROM MEDICAL APPOINTMENTS OR FROM GETTING MEDICATIONS?: NO

## 2022-09-27 SDOH — ECONOMIC STABILITY: HOUSING INSECURITY: IN THE LAST 12 MONTHS, HOW MANY PLACES HAVE YOU LIVED?: 1

## 2022-09-27 ASSESSMENT — ENCOUNTER SYMPTOMS
BACK PAIN: 0
CHEST TIGHTNESS: 0
ABDOMINAL PAIN: 0
COLOR CHANGE: 1
SHORTNESS OF BREATH: 0
VOMITING: 0
NAUSEA: 0

## 2022-09-27 ASSESSMENT — SOCIAL DETERMINANTS OF HEALTH (SDOH): HOW HARD IS IT FOR YOU TO PAY FOR THE VERY BASICS LIKE FOOD, HOUSING, MEDICAL CARE, AND HEATING?: NOT HARD AT ALL

## 2022-09-27 NOTE — PROGRESS NOTES
2022    TELEHEALTH EVALUATION -- Audio/Visual (During RQYMN-59 public health emergency)    HPI:    Lucius Castillo (:  1990) has requested an audio/video evaluation for the following concern(s):    Chief Complaint   Patient presents with    Other     Discuss mental health        Longstanding of Bipolar disorder  Has tried many medications in the past, has seen psychiatry multiple times   Angel Gary is very helpful  Has the medication assistance program, Ashley Giraldo is assisting  Feels his depression is worsening, has little energy  Interested in wellbutrin     Issues with psoriasis, + long standing Hx    Elbows have been hurting more   Wondering if there is a component of psoriatic arthritis  Aches and pains in multiple joints, + stiffiness and occasional swelling     Review of Systems   Constitutional:  Negative for activity change, appetite change, chills, diaphoresis, fatigue, fever and unexpected weight change. Respiratory:  Negative for chest tightness and shortness of breath. Cardiovascular:  Negative for chest pain, palpitations and leg swelling. Gastrointestinal:  Negative for abdominal pain, nausea and vomiting. Genitourinary:  Negative for difficulty urinating. Musculoskeletal:  Positive for arthralgias, joint swelling and myalgias. Negative for back pain, gait problem, neck pain and neck stiffness. Skin:  Positive for color change and rash. Negative for pallor and wound. Neurological:  Negative for dizziness, tremors, seizures, weakness, light-headedness, numbness and headaches. Psychiatric/Behavioral:  Positive for dysphoric mood. Negative for agitation, behavioral problems, confusion, decreased concentration, hallucinations, self-injury, sleep disturbance and suicidal ideas. The patient is not nervous/anxious and is not hyperactive. Prior to Visit Medications    Medication Sig Taking?  Authorizing Provider   cariprazine hcl (VRAYLAR) 6 MG CAPS capsule Take 1 capsule by mouth daily Yes Emilia Zhou MD       Social History     Tobacco Use    Smoking status: Former     Packs/day: 1.00     Years: 10.00     Pack years: 10.00     Types: Cigarettes     Quit date: 2013     Years since quittin.8    Smokeless tobacco: Never   Substance Use Topics    Alcohol use: No     Comment: occasionally    Drug use: No     Comment: past history of heroin use        Allergies   Allergen Reactions    Cyclobenzaprine Other (See Comments)    Diclofenac Shortness Of Breath    Sulfamethoxazole-Trimethoprim Shortness Of Breath    Sulfa Antibiotics    ,   Past Medical History:   Diagnosis Date    Anxiety     Carpal tunnel syndrome     Depression     Headache     Pneumothorax    ,   Past Surgical History:   Procedure Laterality Date    CHEST TUBE INSERTION     ,   Social History     Tobacco Use    Smoking status: Former     Packs/day: 1.00     Years: 10.00     Pack years: 10.00     Types: Cigarettes     Quit date: 2013     Years since quittin.8    Smokeless tobacco: Never   Substance Use Topics    Alcohol use: No     Comment: occasionally    Drug use: No     Comment: past history of heroin use   ,   Family History   Problem Relation Age of Onset    Diabetes Father     Heart Attack Maternal Grandmother     Heart Disease Maternal Grandmother     Prostate Cancer Maternal Grandfather     Cancer Paternal Grandfather     Prostate Cancer Paternal Grandfather    ,   Immunization History   Administered Date(s) Administered    COVID-19, PFIZER PURPLE top, DILUTE for use, (age 15 y+), 30mcg/0.3mL 2021, 10/03/2021    Tdap (Boostrix, Adacel) 2017, 10/16/2018   ,   Health Maintenance   Topic Date Due    Varicella vaccine (1 of 2 - 2-dose childhood series) Never done    Depression Monitoring  Never done    COVID-19 Vaccine (3 - Booster for Pfizer series) 2022    Flu vaccine (1) Never done    DTaP/Tdap/Td vaccine (4 - Td or Tdap) 10/16/2028    HIV screen  Completed    Hepatitis A vaccine Aged Out    Hepatitis B vaccine  Aged Out    Hib vaccine  Aged Out    Meningococcal (ACWY) vaccine  Aged Out    Pneumococcal 0-64 years Vaccine  Aged Out       PHYSICAL EXAMINATION:  [ INSTRUCTIONS:  \"[x]\" Indicates a positive item  \"[]\" Indicates a negative item  -- DELETE ALL ITEMS NOT EXAMINED]  Vital Signs: (As obtained by patient/caregiver or practitioner observation)    Blood pressure-  Heart rate-    Respiratory rate-    Temperature-  Pulse oximetry-     Constitutional: [x] Appears well-developed and well-nourished [x] No apparent distress      [] Abnormal-   Mental status  [x] Alert and awake  [] Oriented to person/place/time [x]Able to follow commands      Eyes:  EOM    [x]  Normal  [] Abnormal-  Sclera  [x]  Normal  [] Abnormal -         Discharge []  None visible  [] Abnormal -    HENT:   [x] Normocephalic, atraumatic. [x] Abnormal   [] Mouth/Throat: Mucous membranes are moist.     External Ears [x] Normal  [] Abnormal-     Neck: [x] No visualized mass     Pulmonary/Chest: [x] Respiratory effort normal.  [x] No visualized signs of difficulty breathing or respiratory distress        [] Abnormal-      Musculoskeletal:   [] Normal gait with no signs of ataxia         [x] Normal range of motion of neck        [] Abnormal-       Neurological:        [x] No Facial Asymmetry (Cranial nerve 7 motor function) (limited exam to video visit)          [] No gaze palsy        [] Abnormal-         Skin:        [x] No significant exanthematous lesions or discoloration noted on facial skin         [] Abnormal-            Psychiatric:       [x] Normal Affect [] No Hallucinations        [] Abnormal-     Other pertinent observable physical exam findings-     ASSESSMENT/PLAN:  1. Bipolar 1 disorder, depressed, severe ([facility] [address]  Inc Vraylar to 6 mg for 2-4 weeks  If not helpful, will add wellbutrin  mg daily   - cariprazine hcl (VRAYLAR) 6 MG CAPS capsule; Take 1 capsule by mouth daily  Dispense: 90 capsule; Refill: 1    2. Psoriasis  - SIRISHA; Future  - C-Reactive Protein; Future  - Sedimentation Rate; Future  - RHEUMATOID FACTOR; Future  3. Arthralgia of both elbows  - SIRISHA; Future  - C-Reactive Protein; Future  - Sedimentation Rate; Future  - RHEUMATOID FACTOR; Future    He is concerned for psoriatic arthritis. Will check labs. If elevated, will refer to rheumatology for a more thorough eval. He is agreeable. If neg/normal, will need in person visit to evaluate, given his trauma/injury history     Return if symptoms worsen or fail to improve. Alcira Javier is a 33 y.o. male being evaluated by a Virtual Visit (video visit) encounter to address concerns as mentioned above. A caregiver was present when appropriate. Due to this being a TeleHealth encounter (During NKJXA-03 public health emergency), evaluation of the following organ systems was limited: Vitals/Constitutional/EENT/Resp/CV/GI//MS/Neuro/Skin/Heme-Lymph-Imm. Pursuant to the emergency declaration under the [address] [place] [address] and the [employer] and [employer] Act, this Virtual Visit was conducted with patient's (and/or legal guardian's) consent, to reduce the patient's risk of exposure to COVID-19 and provide necessary medical care. The patient (and/or legal guardian) has also been advised to contact this office for worsening conditions or problems, and seek emergency medical treatment and/or call 911 if deemed necessary. Services were provided through a video synchronous discussion virtually to substitute for in-person clinic visit. Patient and provider were located at their individual homes. --Mariam Stephens MD on 9/27/2022 at 9:22 AM    An electronic signature was used to authenticate this note.

## 2022-09-27 NOTE — Clinical Note
We increased his Vraylar today to 6 mg. Can you update the script and assist with more medication? Thank you!

## 2022-09-28 ENCOUNTER — TELEPHONE (OUTPATIENT)
Dept: ADMINISTRATIVE | Age: 32
End: 2022-09-28

## 2022-09-28 NOTE — TELEPHONE ENCOUNTER
Submitted PA for Vraylar 6MG capsules, Key: [facility]. Status: Approved, Coverage Starts on: 9/28/2022 12:00:00 AM, Coverage Ends on: 9/28/2023. Please notify patient. Thank you.

## 2022-10-11 ENCOUNTER — TELEPHONE (OUTPATIENT)
Dept: FAMILY MEDICINE CLINIC | Age: 32
End: 2022-10-11

## 2022-10-11 NOTE — TELEPHONE ENCOUNTER
----- Message from Rosalba Goo sent at 10/11/2022  8:42 AM EDT -----  Subject: Message to Provider    QUESTIONS  Information for Provider? pt is needing to reschedule his psych appt,   please give him a call to do so.   ---------------------------------------------------------------------------  --------------  [address] [employer]  [phone]; OK to leave message on voicemail  ---------------------------------------------------------------------------  --------------  SCRIPT ANSWERS  Relationship to Patient?  Self

## 2022-10-31 ENCOUNTER — TELEPHONE (OUTPATIENT)
Dept: FAMILY MEDICINE CLINIC | Age: 32
End: 2022-10-31

## 2022-10-31 NOTE — TELEPHONE ENCOUNTER
----- Message from Maven7 sent at 10/31/2022 12:29 PM EDT -----  Subject: Message to Provider    QUESTIONS  Information for Provider? Patient is not feeling well, and was unable to   go to work, and would like to know if there is any way that Dr. Adelina Taylor   could write a Doctor's note for today, for him to take to work tomorrow. He is requesting a call back. ---------------------------------------------------------------------------  --------------  Ximena Zayas [facility]  [phone]; OK to leave message on voicemail  ---------------------------------------------------------------------------  --------------  SCRIPT ANSWERS  Relationship to Patient?  Self

## 2022-11-01 ENCOUNTER — SCHEDULED TELEPHONE ENCOUNTER (OUTPATIENT)
Dept: PRIMARY CARE CLINIC | Age: 32
End: 2022-11-01

## 2022-11-01 DIAGNOSIS — F30.9 MANIC EPISODE (HCC): ICD-10-CM

## 2022-11-01 DIAGNOSIS — F31.4 BIPOLAR 1 DISORDER, DEPRESSED, SEVERE (HCC): Primary | ICD-10-CM

## 2022-11-01 DIAGNOSIS — B00.1 COLD SORE: ICD-10-CM

## 2022-11-01 PROCEDURE — 99442 PR PHYS/QHP TELEPHONE EVALUATION 11-20 MIN: CPT | Performed by: NURSE PRACTITIONER

## 2022-11-01 RX ORDER — VALACYCLOVIR HYDROCHLORIDE 1 G/1
2000 TABLET, FILM COATED ORAL DAILY
Qty: 60 TABLET | Refills: 0 | Status: SHIPPED | OUTPATIENT
Start: 2022-11-01 | End: 2022-12-01

## 2022-11-01 NOTE — TELEPHONE ENCOUNTER
Called the patient regarding the note, states that he was having a manic day. He is feeling a little better today and will attempt to go to work. He would like the work note for 10/31/22.

## 2022-11-01 NOTE — PROGRESS NOTES
Nathaly Cates is a 27 y.o. male evaluated via telephone on 11/1/2022 for Other (Manic episode yesterday needs a note for work/He cut his medicine to 3 mg tablet or in half/Wanting a refill on his Valtrex for cold sores. Not active at present time)  . Assessment & Plan   1. Bipolar 1 disorder, depressed, severe ([address]  Problem  Continue Vraylar and follow-up with PCP within 2 weeks    2. Manic episode ([address]  Problem  He told me that he is taking half the dose of the Vraylar  He was instructed he should follow-up with his PCP so she is aware of this    3. Cold sore  Problem  -     valACYclovir (VALTREX) 1 g tablet; Take 2 tablets by mouth daily, Disp-60 tablet, R-0Normal  See patient instructions       Subjective   Prior to Visit Medications    Medication Sig Taking? Authorizing Provider   valACYclovir (VALTREX) 1 g tablet Take 2 tablets by mouth daily Yes NORM Villavicencio - CNP   cariprazine hcl (VRAYLAR) 6 MG CAPS capsule Take 1 capsule by mouth daily  Leo Olson MD     This is a 75-year-old male patient of Dr. Fiorella Bradley consenting to a telephone visit  He has a history of bipolar and he states he had a manic episode yesterday and had to miss work. He did admit to cutting his Vraylar in half and is only taking 3 mg tablets. Note was given that he was seen today and that he can return to work today in which he did work today. I asked him to please follow-up with Dr. Fiorella Bradley due to the change in medication within the next few weeks  He also is needing a refill on his Valtrex for cold sores. He denies an active exacerbation at this time just needing a refill. I told him I can do 30 days for him. Review of Systems   Psychiatric/Behavioral:          Manic attack yesterday   All other systems reviewed and are negative.       Objective   Patient-Reported Vitals  No data recorded       Total Time: minutes: 11-20 minutes     Nathaly Cates was evaluated through a synchronous (real-time) audio only encounter. Patient identification was verified at the start of the visit. He (or guardian if applicable) is aware that this is a billable service, which includes applicable co-pays. This visit was conducted with patient's (and/or legal guardian's) verbal consent. He has not had a related appointment within my department in the past 7 days or scheduled within the next 24 hours. The patient was located at Home: [address] [place] [address].   The provider [facility] [address] [place], [place] [address]sally Cardenas, NORM [facility]

## 2022-11-04 ENCOUNTER — TELEPHONE (OUTPATIENT)
Dept: FAMILY MEDICINE CLINIC | Age: 32
End: 2022-11-04

## 2022-11-04 NOTE — TELEPHONE ENCOUNTER
----- Message from [place] [employer] [place] sent at 11/4/2022  1:11 PM EDT -----  Subject: Message to Provider    QUESTIONS  Information for Provider? Pt states he had his pcp write him a work excuse   for his employer and we sent it through [employer]. Pt states he is unable to   access his mychart and wants to know if a copy of the could be emailed to   him at [email or web address], please advise.   ---------------------------------------------------------------------------  --------------  Kenya RAMIREZ  [phone]; OK to leave message on voicemail  ---------------------------------------------------------------------------  --------------  SCRIPT ANSWERS  Relationship to Patient?  Self

## 2022-12-13 ENCOUNTER — TELEPHONE (OUTPATIENT)
Dept: FAMILY MEDICINE CLINIC | Age: 32
End: 2022-12-13

## 2022-12-13 ENCOUNTER — OFFICE VISIT (OUTPATIENT)
Dept: FAMILY MEDICINE CLINIC | Age: 32
End: 2022-12-13
Payer: COMMERCIAL

## 2022-12-13 VITALS
BODY MASS INDEX: 27.01 KG/M2 | DIASTOLIC BLOOD PRESSURE: 84 MMHG | HEART RATE: 88 BPM | WEIGHT: 199.4 LBS | HEIGHT: 72 IN | TEMPERATURE: 98.7 F | SYSTOLIC BLOOD PRESSURE: 132 MMHG | OXYGEN SATURATION: 98 %

## 2022-12-13 DIAGNOSIS — R50.9 FEVER, UNSPECIFIED FEVER CAUSE: ICD-10-CM

## 2022-12-13 DIAGNOSIS — J06.9 VIRAL URI: ICD-10-CM

## 2022-12-13 DIAGNOSIS — H66.001 NON-RECURRENT ACUTE SUPPURATIVE OTITIS MEDIA OF RIGHT EAR WITHOUT SPONTANEOUS RUPTURE OF TYMPANIC MEMBRANE: Primary | ICD-10-CM

## 2022-12-13 LAB
INFLUENZA A ANTIBODY: NEGATIVE
INFLUENZA B ANTIBODY: NEGATIVE
S PYO AG THROAT QL: NORMAL

## 2022-12-13 PROCEDURE — 87880 STREP A ASSAY W/OPTIC: CPT | Performed by: NURSE PRACTITIONER

## 2022-12-13 PROCEDURE — 87804 INFLUENZA ASSAY W/OPTIC: CPT | Performed by: NURSE PRACTITIONER

## 2022-12-13 PROCEDURE — 99213 OFFICE O/P EST LOW 20 MIN: CPT | Performed by: NURSE PRACTITIONER

## 2022-12-13 RX ORDER — AMOXICILLIN 500 MG/1
500 CAPSULE ORAL 3 TIMES DAILY
Qty: 21 CAPSULE | Refills: 0 | Status: SHIPPED | OUTPATIENT
Start: 2022-12-13 | End: 2022-12-20

## 2022-12-13 ASSESSMENT — ENCOUNTER SYMPTOMS
RHINORRHEA: 0
WHEEZING: 0
SINUS PAIN: 1
SORE THROAT: 1
SINUS PRESSURE: 1
COUGH: 0
NAUSEA: 1
VOMITING: 1
DIARRHEA: 1
SHORTNESS OF BREATH: 0

## 2022-12-13 NOTE — PATIENT INSTRUCTIONS
Drink plenty of fluids   Get plenty of rest  Finish course of antibiotics   Take medications as prescribed   Go to nearest ER if develop shortness of breath, chest pain or significant worsening of symptoms   Notify office if symptoms worsen or do not improve   Warm compresses to ear as needed   Saltwater gargles for throat   Tylenol or Ibuprofen as needed

## 2022-12-13 NOTE — LETTER
[address]  [facility] [address]  Phone: [phone]  Fax: [phone]    NROM Graves CNP        December 13, 2022     Patient: Jeremy Bean   YOB: 1990   Date of Visit: 12/13/2022       To Whom It May Concern: It is my medical opinion that Pablo Staff may return to work on 12/15/22    If you have any questions or concerns, please don't hesitate to call.     Sincerely,        NORM Graves CNP

## 2022-12-13 NOTE — PROGRESS NOTES
Indio eHrrera (:  1990) is a 27 y.o. male,Established patient, here for evaluation of the following chief complaint(s):  Pharyngitis (Headache, thinks possible ear infection,no exposure, been going on for two days )         ASSESSMENT/PLAN:  1. Non-recurrent acute suppurative otitis media of right ear without spontaneous rupture of tympanic membrane      Amoxicillin as prescribed   2. Fever, unspecified fever cause  -     POCT Influenza A/B-negative  -     POCT rapid strep -negative   -     amoxicillin (AMOXIL) 500 MG capsule; Take 1 capsule by mouth 3 times daily for 7 days, Disp-21 capsule, R-0Normal  3. Viral URI  Drink plenty of fluids   Get plenty of rest  Finish course of antibiotics   Take medications as prescribed   Go to nearest ER if develop shortness of breath, chest pain or significant worsening of symptoms   Notify office if symptoms worsen or do not improve   Warm compresses to ear as needed   Saltwater gargles for throat   Tylenol or Ibuprofen as needed     Return if symptoms worsen or fail to improve. Subjective   SUBJECTIVE/OBJECTIVE:  Symptoms started yesterday. Fever Tmax of 103. Last fever was today. Has taken Ibuprofen at home for fever which brings it down   Patient with history of multiple ear infections, reports his ear pain feels the same as his past infections. Review of Systems   Constitutional:  Positive for chills, fatigue and fever. HENT:  Positive for ear discharge, ear pain, sinus pressure, sinus pain and sore throat. Negative for congestion, postnasal drip and rhinorrhea. Respiratory:  Negative for cough, shortness of breath and wheezing. Cardiovascular:  Negative for chest pain. Gastrointestinal:  Positive for diarrhea, nausea and vomiting. Musculoskeletal:  Positive for myalgias. Neurological:  Positive for headaches. Objective   Physical Exam  Vitals reviewed. Constitutional:       General: He is not in acute distress.   HENT: Head: Normocephalic. Right Ear: External ear normal. Tenderness present. A middle ear effusion is present. Tympanic membrane is erythematous and bulging. Left Ear: Tympanic membrane, ear canal and external ear normal.      Nose: Rhinorrhea present. Rhinorrhea is clear. Right Turbinates: Not swollen. Left Turbinates: Not swollen. Right Sinus: Frontal sinus tenderness present. No maxillary sinus tenderness. Left Sinus: Frontal sinus tenderness present. No maxillary sinus tenderness. Mouth/Throat:      Lips: Pink. Pharynx: Posterior oropharyngeal erythema present. No pharyngeal swelling or oropharyngeal exudate. Eyes:      Pupils: Pupils are equal, round, and reactive to light. Cardiovascular:      Rate and Rhythm: Normal rate and regular rhythm. Heart sounds: Normal heart sounds. Pulmonary:      Effort: Pulmonary effort is normal.      Breath sounds: Normal breath sounds. No wheezing, rhonchi or rales. Chest:      Chest wall: No tenderness. Lymphadenopathy:      Cervical: No cervical adenopathy. Skin:     General: Skin is warm and dry. Capillary Refill: Capillary refill takes less than 2 seconds. Neurological:      General: No focal deficit present. Mental Status: He is alert. This note was generated completely or in part utilizing Dragon dictation speech recognition software. Occasionally, words are mistranscribed and despite editing, the text may contain inaccuracies due to incorrect word recognition. If further clarification is needed please contact the office at [phone]. An electronic signature was used to authenticate this note.     --Earline Angel, NORM - CNP

## 2022-12-13 NOTE — TELEPHONE ENCOUNTER
Attempted to reach the patient to see if he would be willing to come in earlier per the providers request. Either 3pm or 3:30pm today 12/13/2022 Please send a message to the MA with verification time.

## 2023-01-04 ENCOUNTER — OFFICE VISIT (OUTPATIENT)
Dept: FAMILY MEDICINE CLINIC | Age: 33
End: 2023-01-04
Payer: COMMERCIAL

## 2023-01-04 VITALS
BODY MASS INDEX: 28.24 KG/M2 | SYSTOLIC BLOOD PRESSURE: 114 MMHG | HEART RATE: 86 BPM | OXYGEN SATURATION: 97 % | DIASTOLIC BLOOD PRESSURE: 68 MMHG | WEIGHT: 208.2 LBS

## 2023-01-04 DIAGNOSIS — R20.0 NUMBNESS AND TINGLING IN LEFT HAND: Primary | ICD-10-CM

## 2023-01-04 DIAGNOSIS — R20.2 NUMBNESS AND TINGLING IN LEFT HAND: Primary | ICD-10-CM

## 2023-01-04 PROCEDURE — 99212 OFFICE O/P EST SF 10 MIN: CPT | Performed by: STUDENT IN AN ORGANIZED HEALTH CARE EDUCATION/TRAINING PROGRAM

## 2023-01-04 ASSESSMENT — PATIENT HEALTH QUESTIONNAIRE - PHQ9
10. IF YOU CHECKED OFF ANY PROBLEMS, HOW DIFFICULT HAVE THESE PROBLEMS MADE IT FOR YOU TO DO YOUR WORK, TAKE CARE OF THINGS AT HOME, OR GET ALONG WITH OTHER PEOPLE: 3
9. THOUGHTS THAT YOU WOULD BE BETTER OFF DEAD, OR OF HURTING YOURSELF: 0
1. LITTLE INTEREST OR PLEASURE IN DOING THINGS: 1
SUM OF ALL RESPONSES TO PHQ QUESTIONS 1-9: 8
8. MOVING OR SPEAKING SO SLOWLY THAT OTHER PEOPLE COULD HAVE NOTICED. OR THE OPPOSITE, BEING SO FIGETY OR RESTLESS THAT YOU HAVE BEEN MOVING AROUND A LOT MORE THAN USUAL: 0
6. FEELING BAD ABOUT YOURSELF - OR THAT YOU ARE A FAILURE OR HAVE LET YOURSELF OR YOUR FAMILY DOWN: 2
3. TROUBLE FALLING OR STAYING ASLEEP: 1
5. POOR APPETITE OR OVEREATING: 1
SUM OF ALL RESPONSES TO PHQ QUESTIONS 1-9: 8
SUM OF ALL RESPONSES TO PHQ QUESTIONS 1-9: 8
2. FEELING DOWN, DEPRESSED OR HOPELESS: 1
4. FEELING TIRED OR HAVING LITTLE ENERGY: 1
7. TROUBLE CONCENTRATING ON THINGS, SUCH AS READING THE NEWSPAPER OR WATCHING TELEVISION: 1
SUM OF ALL RESPONSES TO PHQ QUESTIONS 1-9: 8
SUM OF ALL RESPONSES TO PHQ9 QUESTIONS 1 & 2: 2

## 2023-01-04 ASSESSMENT — ANXIETY QUESTIONNAIRES
4. TROUBLE RELAXING: 3
7. FEELING AFRAID AS IF SOMETHING AWFUL MIGHT HAPPEN: 3
5. BEING SO RESTLESS THAT IT IS HARD TO SIT STILL: 2
1. FEELING NERVOUS, ANXIOUS, OR ON EDGE: 3
2. NOT BEING ABLE TO STOP OR CONTROL WORRYING: 3
3. WORRYING TOO MUCH ABOUT DIFFERENT THINGS: 3
GAD7 TOTAL SCORE: 20
IF YOU CHECKED OFF ANY PROBLEMS ON THIS QUESTIONNAIRE, HOW DIFFICULT HAVE THESE PROBLEMS MADE IT FOR YOU TO DO YOUR WORK, TAKE CARE OF THINGS AT HOME, OR GET ALONG WITH OTHER PEOPLE: VERY DIFFICULT
6. BECOMING EASILY ANNOYED OR IRRITABLE: 3

## 2023-01-04 NOTE — PROGRESS NOTES
Patient: Indio Herrera is a 27 y.o. male who presents today with the following Chief Complaint(s):  Chief Complaint   Patient presents with    Carpal Tunnel     Left hand          HPI    Reports numbness and tingling of left hand. History of carpal tunnel. Has also noticed some loss of strength. Current Outpatient Medications   Medication Sig Dispense Refill    cariprazine hcl (VRAYLAR) 3 MG CAPS capsule Take 3 mg by mouth daily      valACYclovir (VALTREX) 1 g tablet Take 2 tablets by mouth daily 60 tablet 0    dicyclomine (BENTYL) 10 MG capsule Take 1 capsule by mouth 4 times daily 30 capsule 1    ondansetron (ZOFRAN) 4 MG tablet Take 1 tablet by mouth every 8 hours as needed for Nausea 20 tablet 0    methocarbamol (ROBAXIN) 500 MG tablet Take 1 tablet by mouth 3 times daily as needed (Musculoskeletal back pain) 30 tablet 0    naproxen (NAPROSYN) 500 MG tablet Take 1 tablet by mouth 2 times daily (with meals) 30 tablet 0     No current facility-administered medications for this visit. Patient's past medical history, surgical history, family history, medications,  andallergies  were all reviewed and updated as appropriate today. Review of Systems  All other systems reviewed and negative    Physical Exam  Vitals:    01/04/23 1012   BP: 114/68   Pulse: 86   SpO2: 97%       Assessment:  Encounter Diagnosis   Name Primary? Numbness and tingling in left hand Yes       Plan:  1. Numbness and tingling in left hand  Patient with numbness and tingling in left hand although involving some of ulnar distributinon. Will check EMG to clarify involved area as wella s refer to ortho for evaluation. - EMG; Future  - External Referral To Orthopedic Surgery      No follow-ups on file.

## 2023-01-10 ENCOUNTER — HOSPITAL ENCOUNTER (EMERGENCY)
Age: 33
Discharge: HOME | End: 2023-01-10
Payer: COMMERCIAL

## 2023-01-10 ENCOUNTER — APPOINTMENT (OUTPATIENT)
Dept: CT IMAGING | Age: 33
End: 2023-01-10
Payer: COMMERCIAL

## 2023-01-10 VITALS
OXYGEN SATURATION: 99 % | WEIGHT: 200 LBS | HEART RATE: 75 BPM | BODY MASS INDEX: 27.09 KG/M2 | SYSTOLIC BLOOD PRESSURE: 131 MMHG | HEIGHT: 72 IN | RESPIRATION RATE: 16 BRPM | DIASTOLIC BLOOD PRESSURE: 87 MMHG | TEMPERATURE: 98.2 F

## 2023-01-10 DIAGNOSIS — R10.9 ABDOMINAL CRAMPING: ICD-10-CM

## 2023-01-10 DIAGNOSIS — B00.1 COLD SORE: ICD-10-CM

## 2023-01-10 DIAGNOSIS — R11.2 NAUSEA AND VOMITING, UNSPECIFIED VOMITING TYPE: ICD-10-CM

## 2023-01-10 DIAGNOSIS — R10.9 RIGHT FLANK PAIN: Primary | ICD-10-CM

## 2023-01-10 DIAGNOSIS — M54.9 MUSCULOSKELETAL BACK PAIN: ICD-10-CM

## 2023-01-10 LAB
A/G RATIO: 1.5 (ref 1.1–2.2)
ALBUMIN SERPL-MCNC: 4.1 G/DL (ref 3.4–5)
ALP BLD-CCNC: 60 U/L (ref 40–129)
ALT SERPL-CCNC: 23 U/L (ref 10–40)
ANION GAP SERPL CALCULATED.3IONS-SCNC: 8 MMOL/L (ref 3–16)
AST SERPL-CCNC: 17 U/L (ref 15–37)
BASOPHILS ABSOLUTE: 0.1 K/UL (ref 0–0.2)
BASOPHILS RELATIVE PERCENT: 1.1 %
BILIRUB SERPL-MCNC: <0.2 MG/DL (ref 0–1)
BILIRUBIN URINE: NEGATIVE
BLOOD, URINE: NEGATIVE
BUN BLDV-MCNC: 19 MG/DL (ref 7–20)
CALCIUM SERPL-MCNC: 8.7 MG/DL (ref 8.3–10.6)
CHLORIDE BLD-SCNC: 105 MMOL/L (ref 99–110)
CLARITY: CLEAR
CO2: 25 MMOL/L (ref 21–32)
COLOR: YELLOW
CREAT SERPL-MCNC: 0.9 MG/DL (ref 0.9–1.3)
EOSINOPHILS ABSOLUTE: 0.3 K/UL (ref 0–0.6)
EOSINOPHILS RELATIVE PERCENT: 3.5 %
GFR SERPL CREATININE-BSD FRML MDRD: >60 ML/MIN/{1.73_M2}
GLUCOSE BLD-MCNC: 78 MG/DL (ref 70–99)
GLUCOSE URINE: NEGATIVE MG/DL
HCT VFR BLD CALC: 44 % (ref 40.5–52.5)
HEMOGLOBIN: 15.2 G/DL (ref 13.5–17.5)
KETONES, URINE: NEGATIVE MG/DL
LEUKOCYTE ESTERASE, URINE: NEGATIVE
LIPASE: 60 U/L (ref 13–60)
LYMPHOCYTES ABSOLUTE: 2.8 K/UL (ref 1–5.1)
LYMPHOCYTES RELATIVE PERCENT: 33.7 %
MCH RBC QN AUTO: 31.6 PG (ref 26–34)
MCHC RBC AUTO-ENTMCNC: 34.6 G/DL (ref 31–36)
MCV RBC AUTO: 91.4 FL (ref 80–100)
MICROSCOPIC EXAMINATION: NORMAL
MONOCYTES ABSOLUTE: 0.6 K/UL (ref 0–1.3)
MONOCYTES RELATIVE PERCENT: 7.3 %
NEUTROPHILS ABSOLUTE: 4.4 K/UL (ref 1.7–7.7)
NEUTROPHILS RELATIVE PERCENT: 54.4 %
NITRITE, URINE: NEGATIVE
PDW BLD-RTO: 13.7 % (ref 12.4–15.4)
PH UA: 6 (ref 5–8)
PLATELET # BLD: 211 K/UL (ref 135–450)
PMV BLD AUTO: 9.3 FL (ref 5–10.5)
POTASSIUM REFLEX MAGNESIUM: 4.2 MMOL/L (ref 3.5–5.1)
PROTEIN UA: NEGATIVE MG/DL
RBC # BLD: 4.81 M/UL (ref 4.2–5.9)
SODIUM BLD-SCNC: 138 MMOL/L (ref 136–145)
SPECIFIC GRAVITY UA: >=1.03 (ref 1–1.03)
TOTAL PROTEIN: 6.8 G/DL (ref 6.4–8.2)
URINE TYPE: NORMAL
UROBILINOGEN, URINE: 0.2 E.U./DL
WBC # BLD: 8.2 K/UL (ref 4–11)

## 2023-01-10 PROCEDURE — 85025 COMPLETE CBC W/AUTO DIFF WBC: CPT

## 2023-01-10 PROCEDURE — 96375 TX/PRO/DX INJ NEW DRUG ADDON: CPT

## 2023-01-10 PROCEDURE — 2580000003 HC RX 258: Performed by: NURSE PRACTITIONER

## 2023-01-10 PROCEDURE — 80053 COMPREHEN METABOLIC PANEL: CPT

## 2023-01-10 PROCEDURE — 96374 THER/PROPH/DIAG INJ IV PUSH: CPT

## 2023-01-10 PROCEDURE — 6360000004 HC RX CONTRAST MEDICATION: Performed by: NURSE PRACTITIONER

## 2023-01-10 PROCEDURE — 99285 EMERGENCY DEPT VISIT HI MDM: CPT

## 2023-01-10 PROCEDURE — 74178 CT ABD&PLV WO CNTR FLWD CNTR: CPT

## 2023-01-10 PROCEDURE — 81003 URINALYSIS AUTO W/O SCOPE: CPT

## 2023-01-10 PROCEDURE — 6360000002 HC RX W HCPCS: Performed by: NURSE PRACTITIONER

## 2023-01-10 PROCEDURE — 83690 ASSAY OF LIPASE: CPT

## 2023-01-10 RX ORDER — ONDANSETRON 4 MG/1
4 TABLET, FILM COATED ORAL EVERY 8 HOURS PRN
Qty: 20 TABLET | Refills: 0 | Status: SHIPPED | OUTPATIENT
Start: 2023-01-10

## 2023-01-10 RX ORDER — MORPHINE SULFATE 4 MG/ML
4 INJECTION, SOLUTION INTRAMUSCULAR; INTRAVENOUS ONCE
Status: COMPLETED | OUTPATIENT
Start: 2023-01-10 | End: 2023-01-10

## 2023-01-10 RX ORDER — METHOCARBAMOL 500 MG/1
500 TABLET, FILM COATED ORAL 3 TIMES DAILY PRN
Qty: 30 TABLET | Refills: 0 | Status: SHIPPED | OUTPATIENT
Start: 2023-01-10 | End: 2023-01-20

## 2023-01-10 RX ORDER — NAPROXEN 500 MG/1
500 TABLET ORAL 2 TIMES DAILY WITH MEALS
Qty: 30 TABLET | Refills: 0 | Status: SHIPPED | OUTPATIENT
Start: 2023-01-10

## 2023-01-10 RX ORDER — 0.9 % SODIUM CHLORIDE 0.9 %
1000 INTRAVENOUS SOLUTION INTRAVENOUS ONCE
Status: COMPLETED | OUTPATIENT
Start: 2023-01-10 | End: 2023-01-10

## 2023-01-10 RX ORDER — ONDANSETRON 2 MG/ML
4 INJECTION INTRAMUSCULAR; INTRAVENOUS ONCE
Status: COMPLETED | OUTPATIENT
Start: 2023-01-10 | End: 2023-01-10

## 2023-01-10 RX ORDER — VALACYCLOVIR HYDROCHLORIDE 1 G/1
2000 TABLET, FILM COATED ORAL DAILY
Qty: 60 TABLET | Refills: 0 | Status: SHIPPED | OUTPATIENT
Start: 2023-01-10 | End: 2023-02-09

## 2023-01-10 RX ORDER — DICYCLOMINE HYDROCHLORIDE 10 MG/1
10 CAPSULE ORAL 4 TIMES DAILY
Qty: 30 CAPSULE | Refills: 1 | Status: SHIPPED | OUTPATIENT
Start: 2023-01-10

## 2023-01-10 RX ORDER — KETOROLAC TROMETHAMINE 30 MG/ML
30 INJECTION, SOLUTION INTRAMUSCULAR; INTRAVENOUS ONCE
Status: COMPLETED | OUTPATIENT
Start: 2023-01-10 | End: 2023-01-10

## 2023-01-10 RX ADMIN — IOPAMIDOL 75 ML: 755 INJECTION, SOLUTION INTRAVENOUS at 17:06

## 2023-01-10 RX ADMIN — SODIUM CHLORIDE 1000 ML: 9 INJECTION, SOLUTION INTRAVENOUS at 16:17

## 2023-01-10 RX ADMIN — ONDANSETRON 4 MG: 2 INJECTION INTRAMUSCULAR; INTRAVENOUS at 16:15

## 2023-01-10 RX ADMIN — MORPHINE SULFATE 4 MG: 4 INJECTION, SOLUTION INTRAMUSCULAR; INTRAVENOUS at 16:15

## 2023-01-10 RX ADMIN — KETOROLAC TROMETHAMINE 30 MG: 30 INJECTION, SOLUTION INTRAMUSCULAR at 18:31

## 2023-01-10 ASSESSMENT — ENCOUNTER SYMPTOMS
ABDOMINAL PAIN: 1
WHEEZING: 0
COLOR CHANGE: 0
DIARRHEA: 0
VOMITING: 1
BACK PAIN: 0
NAUSEA: 1
SHORTNESS OF BREATH: 0
COUGH: 0

## 2023-01-10 ASSESSMENT — PAIN SCALES - GENERAL
PAINLEVEL_OUTOF10: 7
PAINLEVEL_OUTOF10: 8
PAINLEVEL_OUTOF10: 5
PAINLEVEL_OUTOF10: 7

## 2023-01-10 ASSESSMENT — PAIN DESCRIPTION - LOCATION: LOCATION: FLANK

## 2023-01-10 ASSESSMENT — PAIN DESCRIPTION - ORIENTATION: ORIENTATION: RIGHT

## 2023-01-10 ASSESSMENT — PAIN - FUNCTIONAL ASSESSMENT: PAIN_FUNCTIONAL_ASSESSMENT: 0-10

## 2023-01-10 NOTE — ED PROVIDER NOTES
**ADVANCED PRACTICE PROVIDER, I HAVE EVALUATED THIS [facility]  ED  EMERGENCY DEPARTMENT ENCOUNTER      Pt Name: Rob Bill  KCB:[id number]  Osmargfmarylin 1990  Date of evaluation: 1/10/2023  Provider: NORM Rivera CNP  Note Started: 3:54 PM EST 1/10/2023        Chief Complaint:    Chief Complaint   Patient presents with    Flank Pain      Per pt Right side radiates to stomach x3 days hx of stones burns with urination         Nursing Notes, Past Medical Hx, Past Surgical Hx, Social Hx, Allergies, and Family Hx were all reviewed and agreed with or any disagreements were addressed in the HPI.    HPI: (Location, Duration, Timing, Severity, Quality, Assoc Sx, Context, Modifying factors)    History From: Patient  Limitations to history : None    Chief Complaint of right flank pain, right abdominal pain nausea and vomiting history of stones    This is a  27 y.o. male who presents to the emergency department with right flank pain, radiating to the right side the abdomen, patient has a history of stones started with burning in urination today, reports nausea and vomiting last night at work, was sent home because he threw up over 3 times. He denies any cough, congestion, fever or chills, no chest pain pleuritic chest pain or shortness of breath. Rates the pain a 7 out of 10. Has not taken anything for the pain. He denies any headache neck pain or neck stiffness, he denies any additional symptoms or complaints, no additional aggravating relieving factors.   Patient presents awake, alert and in no acute respiratory distress or toxic appearance    PastMedical/Surgical History:      Diagnosis Date    Anxiety     Carpal tunnel syndrome     Depression     Headache     Pneumothorax          Procedure Laterality Date    CHEST TUBE INSERTION         Medications:  Previous Medications    CARIPRAZINE HCL (VRAYLAR) 3 MG CAPS CAPSULE    Take 3 mg by mouth daily    VALACYCLOVIR (VALTREX) 1 G TABLET    Take 2 tablets by mouth daily       Review of Systems:  (1 systems needed)  Review of Systems   Constitutional:  Negative for chills and fever. HENT:  Negative for congestion. Respiratory:  Negative for cough, shortness of breath and wheezing. Cardiovascular:  Negative for chest pain. Gastrointestinal:  Positive for abdominal pain, nausea and vomiting. Negative for diarrhea. Patient complains of right flank pain, radiating to the right side the abdomen, patient has a history of stones started with burning in urination today, reports nausea and vomiting last night at work, was sent home because he threw up over 3 times. He denies any cough, congestion, fever or chills, no chest pain pleuritic chest pain or shortness of breath. Rates the pain a 7 out of 10. Genitourinary:  Positive for difficulty urinating, dysuria and flank pain. Negative for frequency and hematuria. Musculoskeletal:  Negative for back pain. Skin:  Negative for color change. Neurological:  Negative for weakness, numbness and headaches. \"Positives and Pertinent negatives as per HPI\"    Physical Exam:  Physical Exam  Vitals and nursing note reviewed. Constitutional:       Appearance: He is well-developed. He is not diaphoretic. HENT:      Head: Normocephalic. Right Ear: External ear normal.      Left Ear: External ear normal.   Eyes:      General:         Right eye: No discharge. Left eye: No discharge. Cardiovascular:      Rate and Rhythm: Normal rate. Pulmonary:      Effort: Pulmonary effort is normal. No respiratory distress. Breath sounds: Normal breath sounds. Abdominal:      Palpations: Abdomen is soft. Tenderness: There is abdominal tenderness. There is right CVA tenderness and guarding. Comments: Abdomen is generally soft and nondistended.   Bowel sounds are hypoactive, patient has tenderness in the right lower quadrant of the abdomen with guarding on exam. Patient also has right-sided CVA tenderness, no rashes or lesions. He is extremely tender in the right lower quadrant of the abdomen however, no ascites or rigidity. No rebound tenderness at McBurney's point. Musculoskeletal:         General: Normal range of motion. Cervical back: Normal range of motion and neck supple. Skin:     General: Skin is warm. Capillary Refill: Capillary refill takes less than 2 seconds. Coloration: Skin is not pale. Neurological:      General: No focal deficit present. Mental Status: He is alert and oriented to person, place, and time. GCS: GCS eye subscore is 4. GCS verbal subscore is 5. GCS motor subscore is 6. Psychiatric:         Behavior: Behavior normal.       MEDICAL DECISION MAKING    Vitals:    Vitals:    01/10/23 1548 01/10/23 1551 01/10/23 1555   BP:  (!) 132/91    Pulse:  82    Resp:  16    Temp:   98.2 °F (36.8 °C)   TempSrc:   Oral   SpO2:  97%    Weight: 200 lb (90.7 kg)     Height: 6' (1.829 m)         LABS:  Labs Reviewed   URINALYSIS   CBC WITH AUTO DIFFERENTIAL   COMPREHENSIVE METABOLIC PANEL W/ REFLEX TO MG FOR LOW K   LIPASE        Remainder of labs reviewed and were negative at this time or not returned at the time of this note. RADIOLOGY:   Non-plain film images such as CT, Ultrasound and MRI are read by the radiologist. Abida NAVARRO, NORM - CNP have directly visualized the radiologic plain film image(s) with the below findings:      Interpretation per the Radiologist below, if available at the time of this note:    CT ABDOMEN PELVIS W WO CONTRAST Additional Contrast? None   Final Result   1. No acute abnormality.            MEDICAL DECISION MAKING / ED COURSE:    Because of high probability of sudden clinical deterioration of the patient's condition and risk of further deterioration, critical care time required my full attention to the patient's condition; which included chart data review, documentation, medication ordering, reviewing the patient's old records, reevaluation patient's cardiac, pulmonary and neurological status. Reevaluation of vital signs. Consultations with ED attending and admitting physician. Ordering, interpreting reviewing diagnostic testing. Therefore, I personally saw the patient and independently provided 22 minutes of non-concurrent critical care out of the total shared critical care time provided, direct attention to the patient's condition did not include time spent on procedures. PROCEDURES:   Procedures    None    Patient was given:  Medications   morphine sulfate (PF) injection 4 mg (4 mg IntraVENous Given 1/10/23 1615)   ondansetron (ZOFRAN) injection 4 mg (4 mg IntraVENous Given 1/10/23 1615)   0.9 % sodium chloride bolus (0 mLs IntraVENous Stopped 1/10/23 1831)   iopamidol (ISOVUE-370) 76 % injection 75 mL (75 mLs IntraVENous Given 1/10/23 1706)   ketorolac (TORADOL) injection 30 mg (30 mg IntraVENous Given 1/10/23 1831)       CONSULTS: (Who and What was discussed)  None          Chronic Conditions affecting care:    has a past medical history of Anxiety, Carpal tunnel syndrome, Depression, Headache, and Pneumothorax. Patient complains of right flank pain, radiating to the right side the abdomen, patient has a history of stones started with burning in urination today, reports nausea and vomiting last night at work, was sent home because he threw up over 3 times. He denies any cough, congestion, fever or chills, no chest pain pleuritic chest pain or shortness of breath. Rates the pain a 7 out of 10. After evaluation and examination patient IV access, blood work, urinalysis, pain and nausea medication including morphine and Zofran IV, CT scan of the abdomen. Urine shows no acute infection, CBC shows no sepsis or anemia. Metabolic panel shows no electrolyte disturbances or renal insufficiency.   Liver functions and lipase are normal.  CT the abdomen shows no acute abnormality of the abdomen and pelvis, organs are all unremarkable, there is no visible signs of stone or hydronephrosis.  Patient was still having pain upon reexamination, I do believe it is more musculoskeletal in nature.  I did order the patient a dose of Toradol.  However, I completed a structured, evidence-based clinical evaluation to screen for acute non-traumatic spinal emergencies. The patient has a normal detailed neurologic exam and a low red flag score. Patient denies any history of new numbness, weakness, incontinence of bowel or bladder, constipation, saddle anesthesia or paresthesias. I estimate there is LOW risk for ABDOMINAL AORTIC ANEURYSM, CAUDA EQUINA SYNDROME, EPIDURAL MASS LESION, OR CORD COMPRESSION.    I have discussed with the patient my clinical impression and the result of an evidence-based clinical evaluation to screen for spinal epidural abscess and other spinal emergencies, as well as the risk of further testing and hospitalization. The evidence shows that the risk for an acute spinal emergency is less than 1%. Although the risk of an acute spinal emergency has not been completely eliminated, the risks of further testing likely exceed any potential benefit, and the patient agrees with not pursuing further emergent evaluation for causes of back pain at this time.  In addition, no concerns for acute surgical abdomen or sepsis.  Therefore, shared medical decision was made between the patient and myself and we agreed the patient could be discharged home with outpatient follow-up.  Patient was discharged home with prescription for Bentyl, Zofran for his abdominal pain and cramping, he was also discharged home with Robaxin and Naprosyn to take due to his musculoskeletal back pain.  He was given referral back to PCP, follow-up in 2 days for reevaluation. The patient tolerated their visit well.  I evaluated the patient.  The physician was available for consultation as needed.  The patient and / or the family  were informed of the results of any tests, a time was given to answer questions, a plan was proposed and they agreed with plan. Patient verbalized understanding of discharge instructions and the patient was discharged from the department in stable condition    I am the Primary Clinician of Record. CLINICAL IMPRESSION:  1. Right flank pain    2. Musculoskeletal back pain    3. Nausea and vomiting, unspecified vomiting type    4.  Abdominal cramping        DISPOSITION Decision To Discharge 01/10/2023 06:40:15 PM      PATIENT REFERRED TO:  Altagracia Johnston MD  [address]  [phone]      Please call your family doctor first thing in the morning and make an appointment for follow-up to be seen in the next 2 days    [facility]  ED  [facility] [address] [phone]  Go to   If symptoms worsen    DISCHARGE MEDICATIONS:  New Prescriptions    DICYCLOMINE (BENTYL) 10 MG CAPSULE    Take 1 capsule by mouth 4 times daily    METHOCARBAMOL (ROBAXIN) 500 MG TABLET    Take 1 tablet by mouth 3 times daily as needed (Musculoskeletal back pain)    NAPROXEN (NAPROSYN) 500 MG TABLET    Take 1 tablet by mouth 2 times daily (with meals)    ONDANSETRON (ZOFRAN) 4 MG TABLET    Take 1 tablet by mouth every 8 hours as needed for Nausea       DISCONTINUED MEDICATIONS:  Discontinued Medications    No medications on file              (Please note the MDM and HPI sections of this note were completed with a voice recognition program.  Efforts were made to edit the dictations but occasionally words are mis-transcribed.)    Electronically signed, NORM Lovett CNP,           NORM Lovett CNP  01/10/23 7873       NORM Lovett CNP  01/10/23 0598

## 2023-01-10 NOTE — TELEPHONE ENCOUNTER
----- Message from Aparna Silveira sent at 1/10/2023  9:06 AM EST -----  Subject: Refill Request    QUESTIONS  Name of Medication? valACYclovir (VALTREX) 1 g tablet  Patient-reported dosage and instructions? 1g, as needed   How many days do you have left? 0  Preferred Pharmacy? [facility] [id number]  Pharmacy phone number (if available)? [phone]  ---------------------------------------------------------------------------  --------------  Magaly Zhong INFO  What is the best way for the office to contact you? OK to leave message on   voicemail  Preferred Call Back Phone Number? [phone]  ---------------------------------------------------------------------------  --------------  SCRIPT ANSWERS  Relationship to Patient?  Self

## 2023-01-10 NOTE — TELEPHONE ENCOUNTER
.Refill Request     CONFIRM preferrred pharmacy with the patient. If Mail Order Rx - Pend for 90 day refill. Last Seen: Last Seen Department: 1/4/2023  Last Seen by PCP: 9/27/2022    Last Written: 11/1/22 60 with 0     If no future appointment scheduled, route STAFF MESSAGE with patient name to the [facility] for scheduling. Next Appointment:   No future appointments. Message sent to [address] [place] [address] to schedule appt with patient?   YES      Requested Prescriptions     Pending Prescriptions Disp Refills    valACYclovir (VALTREX) 1 g tablet 60 tablet 0     Sig: Take 2 tablets by mouth daily

## 2023-01-11 ENCOUNTER — TELEPHONE (OUTPATIENT)
Dept: FAMILY MEDICINE CLINIC | Age: 33
End: 2023-01-11

## 2023-01-11 NOTE — TELEPHONE ENCOUNTER
Telephone Call regarding Forms    If the patient has had recent visit with the provider AND discussed the forms during this visit then NO appointment is necessary. Please advise patient that forms require an appointment to be scheduled so that patient and provider can review the necessary documentation together. Type of Form:  Work / School excuse  Reason for the form/medical condition:  kidney stones for 3 days      MUST BE COMPLETED FOR WORK EXCUSE, GHISLAINE OR FMLA  First Day out of work -  1/10/2023  Anticipated Return to Work Date -  1/12/2023    Forms to be sent to:  Email: [email or web address]  Contact Name: Patient     Date needed: 1/11/2023  Appointment scheduled:  No NA     No future appointments. Please consult list of providers who do NOT provide forms for SERVICE [employer]IMALS.

## 2023-01-11 NOTE — LETTER
[address]  [address] [address]  Phone: [phone]  Fax: [phone]    John Griffiths MD        January 11, 2023     Patient: Kate Cureil   YOB: 1990   Date of Visit: 1/11/2023       To Whom It May Concern: It is my medical opinion that Marisol Roman should remain off work on 1/10/23 and 1/11/2023. He may return on 1/12/23. If you have any questions or concerns, please don't hesitate to call.     Sincerely,        John Griffiths MD

## 2023-01-11 NOTE — TELEPHONE ENCOUNTER
Attempt to contact patient, unable to leave message, will try again later. Letter emailed to patient.

## 2023-01-11 NOTE — ED NOTES
Pt scripts x4 instructed to follow up with PCP. Assessed per [facility] DAVID.      Patt Sethi, TONO  80/52/89 [phone]

## 2023-01-11 NOTE — LETTER
[address]  [facility] [address] [facility] [address] [address]  Phone: [phone]  Fax: [phone]    Lety Liu MD        January 12, 2023     Patient: Michel Quinn   YOB: 1990   Date of Visit: 1/11/2023       To Whom It May Concern: It is my medical opinion that Chin Meek may remain out of work out from 1/10/23-1/12/23 and  return to work on 1/13/23. If you have any questions or concerns, please don't hesitate to call.     Sincerely,        Lety Liu MD

## 2023-01-12 NOTE — TELEPHONE ENCOUNTER
Spoke with the patient, he asked if he could have a note stating that he was off today for his kidney stone. Explained that I would have to talk to Dr. Bradly Weaver first and call him back. Dr. Bradly Weaver agreed to 1/10/23-1/12/23, but from this point on he will need to be evaluated for a work note. The patient has been notified of this information and all questions answered.

## 2023-01-16 ENCOUNTER — TELEPHONE (OUTPATIENT)
Dept: FAMILY MEDICINE CLINIC | Age: 33
End: 2023-01-16

## 2023-01-16 NOTE — TELEPHONE ENCOUNTER
----- Message from Asuncion Siemens sent at 1/16/2023  2:16 PM EST -----  Subject: Message to Provider    QUESTIONS  Information for Provider? Patient called regarding the work note that was   written it was written for only 10 & 11 and it needed to be for the 11th &   12th. Please re-email to patient. Please return call.   ---------------------------------------------------------------------------  --------------  Keerthi Quintanilla [facility]  [phone]; OK to leave message on voicemail  ---------------------------------------------------------------------------  --------------  SCRIPT ANSWERS  Relationship to Patient?  Self

## 2023-01-16 NOTE — TELEPHONE ENCOUNTER
This letter was sent on 1/12/23, spoke with the patient regarding this letter. I will email it to him again and he will call back if he doesn't have it in the next 10 minutes.

## 2023-01-17 NOTE — TELEPHONE ENCOUNTER
Refill Request     CONFIRM preferrred pharmacy with the patient. If Mail Order Rx - Pend for 90 day refill. Last Seen: Last Seen Department: 1/4/2023  Last Seen by PCP: 9/27/2022    Last Written:     If no future appointment scheduled, route STAFF MESSAGE with patient name to the [facility] for scheduling. Next Appointment:   No future appointments. Message sent to [address] [place] [address] to schedule appt with patient?   NO      Requested Prescriptions     Pending Prescriptions Disp Refills    cariprazine hcl (VRAYLAR) 3 MG CAPS capsule 30 capsule      Sig: Take 1 capsule by mouth daily

## 2023-01-18 ENCOUNTER — TELEPHONE (OUTPATIENT)
Dept: FAMILY MEDICINE CLINIC | Age: 33
End: 2023-01-18

## 2023-01-18 NOTE — TELEPHONE ENCOUNTER
----- Message from Magdalenedianne Greenwood sent at 1/17/2023  3:54 PM EST -----  Subject: Message to Provider    QUESTIONS  Information for Provider? requesting work note for Rocky 10, 11 and 12 be   faxed to [phone]. Att? HR  ---------------------------------------------------------------------------  --------------  Madeline VINCENT  [phone]; OK to leave message on voicemail  ---------------------------------------------------------------------------  --------------  SCRIPT ANSWERS  Relationship to Patient?  Self

## 2023-02-02 ENCOUNTER — TELEPHONE (OUTPATIENT)
Dept: FAMILY MEDICINE CLINIC | Age: 33
End: 2023-02-02

## 2023-02-02 NOTE — TELEPHONE ENCOUNTER
Scheduled patient for a virtual visit on 3/7/23 @ 930 am.   Attempt to contact the patient, unable to leave message.

## 2023-02-02 NOTE — TELEPHONE ENCOUNTER
----- Message from Yasmeen Peguero sent at 2/2/2023  1:45 PM EST -----  Subject: Appointment Request    Reason for Call: Established Patient Appointment needed: Routine Existing   Condition Follow Up    QUESTIONS    Reason for appointment request? No appointments available during search     Additional Information for Provider? Pt would like an appt with Nicole Marisolnawaf for depression. No appts till april.  Pt would like to be seen soon  ---------------------------------------------------------------------------  --------------  Kim Reddy INFO  [phone]; OK to leave message on voicemail  ---------------------------------------------------------------------------  --------------  SCRIPT ANSWERS  COVID Screen: Daryn Bond

## 2023-02-02 NOTE — TELEPHONE ENCOUNTER
Patient states that he and Dr. Ani Marcial discuss the possibility of getting  wellbutrin prescribed to him and a sleeping aid rx. Patient would like to be seen sooner than April. Please let me know if I can scheduled with any of you.  Thank you

## 2023-02-15 ENCOUNTER — ANESTHESIA EVENT (OUTPATIENT)
Dept: OPERATING ROOM | Age: 33
End: 2023-02-15
Payer: COMMERCIAL

## 2023-02-15 NOTE — PROGRESS NOTES
1.  Do not eat or drink anything after 12 midnight prior to surgery. This includes no water, chewing gum or mints. 2.  Take the following pills with a small sip of water on the morning of surgery. 3.  Aspirin, Ibuprofen, Advil, Naproxen, Vitamin E and other Anti-inflammatory products should be stopped for 5 days before surgery or as directed by your physician. 4.  Check with your doctor regarding stopping Plavix, Coumadin, Lovenox, Fragmin or other blood thinners. 5.  Do not smoke and do not drink alcoholic beverages 24 hours prior to surgery. This includes NA Beer. 6.  You may brush your teeth and gargle the morning of surgery. DO NOT SWALLOW WATER.  7.  You MUST make arrangements for a responsible adult to take you home after your surgery. You will not be allowed to leave alone or drive yourself home. It is strongly suggested someone stay with you the first 24 hours. Your surgery will be cancelled if you do not have a ride home. 8.  A parent/legal guardian must accompany a child scheduled for surgery and plan to stay at the hospital until the child is discharged. Please do not bring other children with you. 9.  Please wear simple, loose fitting clothing to the hospital.  Irina Coma not bring valuables ( money, credit cards, checkbooks, etc.)  Do not wear any makeup (including no eye makeup) or nail polish on your fingers or toes. 10.  Do not wear any jewelry or piercing on the day of surgery. All body piercing jewelry must be removed. 11.  If you have dentures, they will be removed before going to the OR; we will provide you a container. If you wear contact lenses or glasses, they will be removed; please bring a case for them. 12.  Please see your family doctor/pediatrician for a history & physical and/or concerning medications. Bring any test results/reports from your physician's office the day of surgery. 15.  Remember to bring Blood Bank Bracelet to the hospital on the day of surgery.   14.  If you have a Living Will and Durable Power of  for Healthcare, please bring in a copy. 13.  Notify your Surgeon if you develop any illness between now and surgery time; cough, cold, fever, sore throat, nausea, vomiting, etc.  Please notify your surgeon if you experience dizziness, shortness of breath or blurred vision between now and the time of your surgery. 16.  DO NOT shave your operative site 96 hours (4 days) prior to surgery. For face and neck surgery, men may use an electric razor 48 hours (2 days) prior to surgery. 17. Shower the night before surgery and the morning of surgery with  an antibacterial soap   or  Chlorhexidine gluconate (for total joint replacement). To provide excellent care, visitors will be limited to two in a room at any given time. Please no children under the age of 15 in the surgical department.

## 2023-02-17 ENCOUNTER — HOSPITAL ENCOUNTER (OUTPATIENT)
Age: 33
Setting detail: OUTPATIENT SURGERY
Discharge: HOME | End: 2023-02-17
Attending: ORTHOPAEDIC SURGERY | Admitting: ORTHOPAEDIC SURGERY
Payer: COMMERCIAL

## 2023-02-17 ENCOUNTER — ANESTHESIA (OUTPATIENT)
Dept: OPERATING ROOM | Age: 33
End: 2023-02-17
Payer: COMMERCIAL

## 2023-02-17 VITALS
OXYGEN SATURATION: 97 % | HEART RATE: 79 BPM | SYSTOLIC BLOOD PRESSURE: 150 MMHG | RESPIRATION RATE: 16 BRPM | BODY MASS INDEX: 27.09 KG/M2 | WEIGHT: 200 LBS | DIASTOLIC BLOOD PRESSURE: 106 MMHG | HEIGHT: 72 IN | TEMPERATURE: 98 F

## 2023-02-17 DIAGNOSIS — G56.02 CARPAL TUNNEL SYNDROME OF LEFT WRIST: Primary | ICD-10-CM

## 2023-02-17 PROCEDURE — 2500000003 HC RX 250 WO HCPCS: Performed by: NURSE ANESTHETIST, CERTIFIED REGISTERED

## 2023-02-17 PROCEDURE — 3600000013 HC SURGERY LEVEL 3 ADDTL 15MIN: Performed by: ORTHOPAEDIC SURGERY

## 2023-02-17 PROCEDURE — C1889 IMPLANT/INSERT DEVICE, NOC: HCPCS | Performed by: ORTHOPAEDIC SURGERY

## 2023-02-17 PROCEDURE — 6360000002 HC RX W HCPCS: Performed by: ANESTHESIOLOGY

## 2023-02-17 PROCEDURE — 6370000000 HC RX 637 (ALT 250 FOR IP): Performed by: ANESTHESIOLOGY

## 2023-02-17 PROCEDURE — 7100000000 HC PACU RECOVERY - FIRST 15 MIN: Performed by: ORTHOPAEDIC SURGERY

## 2023-02-17 PROCEDURE — 3600000003 HC SURGERY LEVEL 3 BASE: Performed by: ORTHOPAEDIC SURGERY

## 2023-02-17 PROCEDURE — 3700000000 HC ANESTHESIA ATTENDED CARE: Performed by: ORTHOPAEDIC SURGERY

## 2023-02-17 PROCEDURE — 7100000010 HC PHASE II RECOVERY - FIRST 15 MIN: Performed by: ORTHOPAEDIC SURGERY

## 2023-02-17 PROCEDURE — 7100000001 HC PACU RECOVERY - ADDTL 15 MIN: Performed by: ORTHOPAEDIC SURGERY

## 2023-02-17 PROCEDURE — 6360000002 HC RX W HCPCS: Performed by: ORTHOPAEDIC SURGERY

## 2023-02-17 PROCEDURE — 2580000003 HC RX 258: Performed by: ORTHOPAEDIC SURGERY

## 2023-02-17 PROCEDURE — 6360000002 HC RX W HCPCS: Performed by: NURSE ANESTHETIST, CERTIFIED REGISTERED

## 2023-02-17 PROCEDURE — 3700000001 HC ADD 15 MINUTES (ANESTHESIA): Performed by: ORTHOPAEDIC SURGERY

## 2023-02-17 PROCEDURE — 2500000003 HC RX 250 WO HCPCS: Performed by: ORTHOPAEDIC SURGERY

## 2023-02-17 PROCEDURE — 7100000011 HC PHASE II RECOVERY - ADDTL 15 MIN: Performed by: ORTHOPAEDIC SURGERY

## 2023-02-17 PROCEDURE — 2709999900 HC NON-CHARGEABLE SUPPLY: Performed by: ORTHOPAEDIC SURGERY

## 2023-02-17 DEVICE — NUSHIELD 2X3CM 6SQ CM: Type: IMPLANTABLE DEVICE | Site: OTHER | Status: FUNCTIONAL

## 2023-02-17 RX ORDER — SODIUM CHLORIDE, SODIUM LACTATE, POTASSIUM CHLORIDE, CALCIUM CHLORIDE 600; 310; 30; 20 MG/100ML; MG/100ML; MG/100ML; MG/100ML
INJECTION, SOLUTION INTRAVENOUS CONTINUOUS
Status: DISCONTINUED | OUTPATIENT
Start: 2023-02-17 | End: 2023-02-17 | Stop reason: HOSPADM

## 2023-02-17 RX ORDER — DIPHENHYDRAMINE HYDROCHLORIDE 50 MG/ML
12.5 INJECTION INTRAMUSCULAR; INTRAVENOUS
Status: DISCONTINUED | OUTPATIENT
Start: 2023-02-17 | End: 2023-02-17 | Stop reason: HOSPADM

## 2023-02-17 RX ORDER — LIDOCAINE HYDROCHLORIDE 20 MG/ML
INJECTION, SOLUTION EPIDURAL; INFILTRATION; INTRACAUDAL; PERINEURAL PRN
Status: DISCONTINUED | OUTPATIENT
Start: 2023-02-17 | End: 2023-02-17 | Stop reason: SDUPTHER

## 2023-02-17 RX ORDER — DEXAMETHASONE SODIUM PHOSPHATE 4 MG/ML
INJECTION, SOLUTION INTRA-ARTICULAR; INTRALESIONAL; INTRAMUSCULAR; INTRAVENOUS; SOFT TISSUE PRN
Status: DISCONTINUED | OUTPATIENT
Start: 2023-02-17 | End: 2023-02-17 | Stop reason: SDUPTHER

## 2023-02-17 RX ORDER — DIPHENHYDRAMINE HCL 12.5MG/5ML
LIQUID (ML) ORAL 4 TIMES DAILY PRN
COMMUNITY

## 2023-02-17 RX ORDER — ONDANSETRON 2 MG/ML
4 INJECTION INTRAMUSCULAR; INTRAVENOUS
Status: DISCONTINUED | OUTPATIENT
Start: 2023-02-17 | End: 2023-02-17 | Stop reason: HOSPADM

## 2023-02-17 RX ORDER — SODIUM CHLORIDE 0.9 % (FLUSH) 0.9 %
5-40 SYRINGE (ML) INJECTION PRN
Status: DISCONTINUED | OUTPATIENT
Start: 2023-02-17 | End: 2023-02-17 | Stop reason: HOSPADM

## 2023-02-17 RX ORDER — SODIUM CHLORIDE 9 MG/ML
INJECTION, SOLUTION INTRAVENOUS PRN
Status: DISCONTINUED | OUTPATIENT
Start: 2023-02-17 | End: 2023-02-17 | Stop reason: HOSPADM

## 2023-02-17 RX ORDER — BUPIVACAINE HYDROCHLORIDE 2.5 MG/ML
INJECTION, SOLUTION EPIDURAL; INFILTRATION; INTRACAUDAL
Status: DISCONTINUED
Start: 2023-02-17 | End: 2023-02-17 | Stop reason: HOSPADM

## 2023-02-17 RX ORDER — BUPIVACAINE HYDROCHLORIDE 2.5 MG/ML
INJECTION, SOLUTION INFILTRATION; PERINEURAL PRN
Status: DISCONTINUED | OUTPATIENT
Start: 2023-02-17 | End: 2023-02-17 | Stop reason: ALTCHOICE

## 2023-02-17 RX ORDER — LIDOCAINE HYDROCHLORIDE 10 MG/ML
0.3 INJECTION, SOLUTION EPIDURAL; INFILTRATION; INTRACAUDAL; PERINEURAL
Status: DISCONTINUED | OUTPATIENT
Start: 2023-02-17 | End: 2023-02-17 | Stop reason: HOSPADM

## 2023-02-17 RX ORDER — OXYCODONE HYDROCHLORIDE AND ACETAMINOPHEN 5; 325 MG/1; MG/1
1 TABLET ORAL EVERY 8 HOURS PRN
Qty: 21 TABLET | Refills: 0 | Status: SHIPPED | OUTPATIENT
Start: 2023-02-17 | End: 2023-02-24

## 2023-02-17 RX ORDER — SODIUM CHLORIDE 0.9 % (FLUSH) 0.9 %
5-40 SYRINGE (ML) INJECTION EVERY 12 HOURS SCHEDULED
Status: DISCONTINUED | OUTPATIENT
Start: 2023-02-17 | End: 2023-02-17 | Stop reason: HOSPADM

## 2023-02-17 RX ORDER — PROPOFOL 10 MG/ML
INJECTION, EMULSION INTRAVENOUS PRN
Status: DISCONTINUED | OUTPATIENT
Start: 2023-02-17 | End: 2023-02-17 | Stop reason: SDUPTHER

## 2023-02-17 RX ORDER — MIDAZOLAM HYDROCHLORIDE 1 MG/ML
INJECTION INTRAMUSCULAR; INTRAVENOUS PRN
Status: DISCONTINUED | OUTPATIENT
Start: 2023-02-17 | End: 2023-02-17 | Stop reason: SDUPTHER

## 2023-02-17 RX ORDER — KETOROLAC TROMETHAMINE 30 MG/ML
30 INJECTION, SOLUTION INTRAMUSCULAR; INTRAVENOUS ONCE
Status: COMPLETED | OUTPATIENT
Start: 2023-02-17 | End: 2023-02-17

## 2023-02-17 RX ORDER — FENTANYL CITRATE 50 UG/ML
INJECTION, SOLUTION INTRAMUSCULAR; INTRAVENOUS PRN
Status: DISCONTINUED | OUTPATIENT
Start: 2023-02-17 | End: 2023-02-17 | Stop reason: SDUPTHER

## 2023-02-17 RX ORDER — MEPERIDINE HYDROCHLORIDE 25 MG/ML
12.5 INJECTION INTRAMUSCULAR; INTRAVENOUS; SUBCUTANEOUS EVERY 5 MIN PRN
Status: DISCONTINUED | OUTPATIENT
Start: 2023-02-17 | End: 2023-02-17 | Stop reason: HOSPADM

## 2023-02-17 RX ORDER — OXYCODONE HYDROCHLORIDE 5 MG/1
5 TABLET ORAL PRN
Status: COMPLETED | OUTPATIENT
Start: 2023-02-17 | End: 2023-02-17

## 2023-02-17 RX ORDER — OXYCODONE HYDROCHLORIDE 5 MG/1
10 TABLET ORAL PRN
Status: COMPLETED | OUTPATIENT
Start: 2023-02-17 | End: 2023-02-17

## 2023-02-17 RX ORDER — ONDANSETRON 2 MG/ML
INJECTION INTRAMUSCULAR; INTRAVENOUS PRN
Status: DISCONTINUED | OUTPATIENT
Start: 2023-02-17 | End: 2023-02-17 | Stop reason: SDUPTHER

## 2023-02-17 RX ORDER — KETOROLAC TROMETHAMINE 30 MG/ML
INJECTION, SOLUTION INTRAMUSCULAR; INTRAVENOUS
Status: DISCONTINUED
Start: 2023-02-17 | End: 2023-02-17 | Stop reason: HOSPADM

## 2023-02-17 RX ORDER — LABETALOL HYDROCHLORIDE 5 MG/ML
5 INJECTION, SOLUTION INTRAVENOUS EVERY 10 MIN PRN
Status: DISCONTINUED | OUTPATIENT
Start: 2023-02-17 | End: 2023-02-17 | Stop reason: HOSPADM

## 2023-02-17 RX ADMIN — HYDROMORPHONE HYDROCHLORIDE 0.5 MG: 1 INJECTION, SOLUTION INTRAMUSCULAR; INTRAVENOUS; SUBCUTANEOUS at 11:07

## 2023-02-17 RX ADMIN — SODIUM CHLORIDE, POTASSIUM CHLORIDE, SODIUM LACTATE AND CALCIUM CHLORIDE: 600; 310; 30; 20 INJECTION, SOLUTION INTRAVENOUS at 10:36

## 2023-02-17 RX ADMIN — SODIUM CHLORIDE, POTASSIUM CHLORIDE, SODIUM LACTATE AND CALCIUM CHLORIDE: 600; 310; 30; 20 INJECTION, SOLUTION INTRAVENOUS at 08:05

## 2023-02-17 RX ADMIN — FENTANYL CITRATE 25 MCG: 50 INJECTION INTRAMUSCULAR; INTRAVENOUS at 10:36

## 2023-02-17 RX ADMIN — LIDOCAINE HYDROCHLORIDE 3 ML: 20 INJECTION, SOLUTION EPIDURAL; INFILTRATION; INTRACAUDAL; PERINEURAL at 09:41

## 2023-02-17 RX ADMIN — KETOROLAC TROMETHAMINE 30 MG: 30 INJECTION, SOLUTION INTRAMUSCULAR; INTRAVENOUS at 11:01

## 2023-02-17 RX ADMIN — ONDANSETRON 4 MG: 2 INJECTION INTRAMUSCULAR; INTRAVENOUS at 10:10

## 2023-02-17 RX ADMIN — OXYCODONE 5 MG: 5 TABLET ORAL at 11:26

## 2023-02-17 RX ADMIN — HYDROMORPHONE HYDROCHLORIDE 0.5 MG: 1 INJECTION, SOLUTION INTRAMUSCULAR; INTRAVENOUS; SUBCUTANEOUS at 11:21

## 2023-02-17 RX ADMIN — MIDAZOLAM 2 MG: 1 INJECTION INTRAMUSCULAR; INTRAVENOUS at 09:36

## 2023-02-17 RX ADMIN — HYDROMORPHONE HYDROCHLORIDE 0.5 MG: 1 INJECTION, SOLUTION INTRAMUSCULAR; INTRAVENOUS; SUBCUTANEOUS at 10:58

## 2023-02-17 RX ADMIN — FENTANYL CITRATE 25 MCG: 50 INJECTION INTRAMUSCULAR; INTRAVENOUS at 09:52

## 2023-02-17 RX ADMIN — DEXAMETHASONE SODIUM PHOSPHATE 8 MG: 4 INJECTION, SOLUTION INTRAMUSCULAR; INTRAVENOUS at 10:10

## 2023-02-17 RX ADMIN — PROPOFOL 200 MG: 10 INJECTION, EMULSION INTRAVENOUS at 09:41

## 2023-02-17 RX ADMIN — CEFAZOLIN 2000 MG: 2 INJECTION, POWDER, FOR SOLUTION INTRAMUSCULAR; INTRAVENOUS at 09:34

## 2023-02-17 RX ADMIN — FENTANYL CITRATE 50 MCG: 50 INJECTION INTRAMUSCULAR; INTRAVENOUS at 09:36

## 2023-02-17 ASSESSMENT — PAIN DESCRIPTION - ORIENTATION
ORIENTATION: LEFT

## 2023-02-17 ASSESSMENT — PAIN - FUNCTIONAL ASSESSMENT
PAIN_FUNCTIONAL_ASSESSMENT: PREVENTS OR INTERFERES WITH MANY ACTIVE NOT PASSIVE ACTIVITIES
PAIN_FUNCTIONAL_ASSESSMENT: 0-10

## 2023-02-17 ASSESSMENT — PAIN SCALES - GENERAL
PAINLEVEL_OUTOF10: 8
PAINLEVEL_OUTOF10: 7
PAINLEVEL_OUTOF10: 8

## 2023-02-17 ASSESSMENT — PAIN DESCRIPTION - DESCRIPTORS: DESCRIPTORS: STABBING

## 2023-02-17 ASSESSMENT — PAIN DESCRIPTION - LOCATION
LOCATION: ELBOW

## 2023-02-17 NOTE — OP NOTE
Autumn Mendez (1990)    Date of Surgery- 2/17/2023    Pre-Op Diagnoses:  1.   left carpal tunnel syndrome  2. left cubital tunnel syndrome         Post-op Diagnoses:   1. Same  2.     same      Procedure(s) Performed:  1.  left carpal tunnel release, open revision  2. left cubital tunnel release, in-situ         Surgeon: Racquel Hayes MD MD    Anesthesia Type:   Gen/local    Blood Loss:   2 cc    Pathology:   None    Complications:   None    Assistant:  Polly WEIR    The left palm and elbow were marked in preop holding by Dr Vita Weinberg after discussing the surgical procedures once again with the patient. All questions and concerns were addressed. Informed consent was on the chart. The patient was brought to the operating and room and placed in the supine position. After the induction of anesthesia a standard time-out was performed. Description of the Procedure: We verified that antibiotics had been given. The left upper extremity was prepped and draped in normal sterile fashion. Final timeout was held. The upper extremity was exsanguinated and the tourniquet was inflated to 250 mmHg. A 2.5 cm incision was made in the mid palm, utilizing the previous incision but slightly extending proximal and distal to gain access to an area more free of scar. This was carried down through the subcutaneous tissues and palmar fascia, bypassing areas of heavy dense scar. Evidence of reconstituted transverse carpal ligament, or area of on release previously, distally. The distal one half of the transverse carpal ligament was incised longitudinally under direct vision using a #15 blade. Careful dissection around the scar tissue down onto the median nerve coming through the carpal tunnel. The nerve itself appeared well aligned. Good coloration.   Careful neurolysis of the median nerve along the course of the carpal tunnel, including the distal extent of the wrist leading up into the forearm. No other abnormalities were noted. Following heavy release of the scar, the nerve was nicely decompressed and healthy-appearing. The wound was copiously irrigated, scar barrier placed along the nerve, to prevent further adhesions. And the skin closed using horizontal mattress #5-0 nylon suture. A separate 5-6 cm curvilinear incision was made just behind the medial epicondyle of the left elbow, through skin only. Meticulous hemostasis. Transversing sensory branches to the medial elbow and medial forearm were identified and protected. Leach's ligament and the cubital tunnel were identified and isolated. Leach's ligament was released completely under direct visualization while protecting the underlying nerve. It was quite tight at Leach's ligament. There was an hourglass shape to the nerve but the nerve appeared to have normal coloring. Nerve was completely released proximally. Release was taken distally to the FCU fascia. The deep and superficial FCU fascial layers were released under direct visualization carefully protecting the small nerve branches. The nerve was nicely decompressed at this time. A small vessel running along the nerve was well filled. The elbow was placed through a range of motion, there was no crepitance, no instability, no subluxation or perching of the nerve. Transposition was not necessary. Wound was copiously irrigated. Due to the heavy adhesions in the palm, a scar barrier was placed along the nerve. No other abnormalities noted. Skin was closed with interrupted nylon suture. Tourniquet was deflated and all fingers were warm and well perfused. Large sterile dressing was placed on the upper extremity. Patient was awoken from sedation, tolerated the case well, was taken to the post anesthesia recovery unit in good condition. No complications.     Findings:  No aberrant motor branch    Intervention:  1% Xylocaine, 0.25% Marcaine, without epinephrine as local injection    Other Notes: Follow-up in 7-10 days for wound inspection and suture removal.  Progressive range of motion of the elbow, wrist, and hand. Progressive activities. Referral to the hand therapist for a carpal tunnel and cubital tunnel program, including scar treatments. Marie Davis MD  Hand & Upper Extremity Surgery            Please note that this transcription was created using voice recognition software. Any errors are unintentional and may be due to voice recognition transcription.

## 2023-02-17 NOTE — PROGRESS NOTES
Patient is able to demonstrate the ability to move from a reclining position to an upright position within the recliner. Pre procedure complete. IV in place.

## 2023-02-17 NOTE — DISCHARGE INSTRUCTIONS
Post op Instructions for Upper Extremity Surgery     * Keep dressing dry and clean. It is ok to shower just keep dressing dry. * Elevate operative arm, attempt to be above heart level. * Ice 20 minutes on 20 minutes off, 3 times per day, first 1-2 days postoperatively. * Follow-up appointment as scheduled by office staff. Check paperwork from office. If no appointment scheduled call the office. * If dressing is too tight, you may loosen Ace bandage   * Light activities, no heavy lifting. * Finger motion encouraged. * If you have any questions, refer to the office number listed below. [phone] ([facility]) or [phone])         [address] Monisha Jordan MD  Hand & Upper Extremity Surgery  [facility]    You are under the influence of drugs- do not drink alcohol, drive a car, operate machinery(such as power tools, kitchen appliances, etc), sign legal documents, or make any important decisions for 24 hours (or while on pain medications). Children should not ride bikes or [place] or play on gym sets  for 24 hours after surgery. A responsible adult should be with you for 24 hours. Rest at home today- increase activity as tolerated. Progress slowly to a regular diet unless your physician has instructed you otherwise. Drink plenty of water. CALL YOUR DOCTOR IF YOU:  Have moderate to severe nausea or vomiting AND are unable to hold down fluids or prescribed medications. Have bright red bloody drainage from your dressing that won't stop oozing.   Do not get relief with your pain medication    NORMAL (POSSIBLE) SIDE EFFECTS FROM ANESTHESIA:     Confusion, temporary memory loss, delayed reaction times in the first 24 hours  Lightheadedness, dizziness, difficulty focusing, blurred vision  Nausea/vomiting can happen  Shivering, feeling cold, sore throat, cough and muscle aches should stop within 24-48 hours  Trouble urinating - call your surgeon if it has been more than 8 hrs  Bruising or soreness at the IV site - call if it remains red, firm or there is drainage             FEMALES OF CHILDBEARING AGE WHO ARE TAKING BIRTH CONTROL PILLS:  You may have received a medication during your procedure that interferes with the   actions of birth control pills (Bridion or Emend). Use some other kind of birth control in addition to your pills, like a condom, for 1 month after your procedure to prevent unwanted pregnancy. The following instructions are to be followed if you have a known history or diagnosis of sleep apnea: For all sleep apnea patients:  ? Sleep on your side or sitting up in a chair whenever possible, especially the first 24 hours after surgery. ? Use only medicines prescribed by your doctor. ? Do not drink alcohol. ? If you have a dental device to assist you while at rest, use it at all times for the first 24 hours. For patients using CPAP machines:  ? Use your CPAP machine during all periods of sleep as usual.  ? Use your CPAP machine during all periods of daytime rest while on pain medicines. ** Follow up with your primary care doctor for continued care. IF YOU DO NOT TAKE ALL OF YOUR NARCOTIC PAIN MEDICATION, please dispose of them responsibly. There are drop off boxes in the Emergency Departments 24/7 at both [address] [place] [address] and [place]. If these locations are not convenient, other options for discarding them can be found at:  [email or web address]    Hospital or office staff may NOT accept any medications to drop off in the cabinet for you.

## 2023-02-17 NOTE — H&P
Department of Orthopedic Surgery  Attending   History and Physical        CHIEF COMPLAINT:  left hand numbness    Reason for Admission: As Above    History Obtained From:  patient    HISTORY OF PRESENT ILLNESS:      The patient is a 27 y.o. male  who presents with left hand numbness despite conservative measures       Past Medical History:        Diagnosis Date    Anxiety     Carpal tunnel syndrome     Depression     Headache     Pneumothorax        Past Surgical History:        Procedure Laterality Date    ARM SURGERY Left     CARPAL TUNNEL RELEASE Bilateral     CHEST TUBE INSERTION         Medications Prior to Admission:   Prior to Admission medications    Medication Sig Start Date End Date Taking? Authorizing Provider   diphenhydrAMINE (BENADRYL) 12.5 MG/5ML elixir Take by mouth 4 times daily as needed for Allergies   Yes Historical Provider, MD   cariprazine hcl (VRAYLAR) 3 MG CAPS capsule Take 1 capsule by mouth daily 1/17/23   Gregory Gimenez DO   dicyclomine (BENTYL) 10 MG capsule Take 1 capsule by mouth 4 times daily 1/10/23   NORM Elena - CNP   ondansetron (ZOFRAN) 4 MG tablet Take 1 tablet by mouth every 8 hours as needed for Nausea 1/10/23   Abida Abarca APRN - CNP   naproxen (NAPROSYN) 500 MG tablet Take 1 tablet by mouth 2 times daily (with meals) 1/10/23   NORM Elena - CNP       Allergies:  Cyclobenzaprine, Diclofenac, Sulfamethoxazole-trimethoprim, and Sulfa antibiotics    Social History:    Tobacco:  reports that he has been smoking cigarettes. He has a 5.00 pack-year smoking history. He has never used smokeless tobacco.   Alcohol:  reports no history of alcohol use.    Illicit Drug: No    Family History:       Problem Relation Age of Onset    Diabetes Father     Heart Attack Maternal Grandmother     Heart Disease Maternal Grandmother     Prostate Cancer Maternal Grandfather     Cancer Paternal Grandfather     Prostate Cancer Paternal Grandfather        REVIEW OF SYSTEMS:  CONSTITUTIONAL:  negative  EYES:  negative  HEENT:  negative  RESPIRATORY:  negative  CARDIOVASCULAR:  negative  GASTROINTESTINAL:  negative  MUSCULOSKELETAL:  positive for  muscle weakness  NEUROLOGICAL:  positive for weakness and numbness    PHYSICAL EXAM:  Admission weight: 200 lb (90.7 kg)  6' (182.9 cm)  VITALS:  BP (!) 142/97   Pulse 73   Temp 98.6 °F (37 °C) (Temporal)   Resp 22   Ht 6' (1.829 m)   Wt 200 lb (90.7 kg)   SpO2 99%   BMI 27.12 kg/m²     awake, alert, cooperative, no apparent distress, and appears stated age  ENT:  Clear, eye movements intact  CHEST:  Clear, regular inspiration  CARDIAC: Reg rate  ABD:  Soft, NT  MUSCULOSKELETAL:  there is no redness, warmth, or swelling of the joints  full range of motion noted  motor strength is 5 out of 5 all extremities bilaterally  tone is normal  with exception of  LEFT ELBOW:  redness absent  warmth absent  swelling absent  tenderness present  NEURO:  +tinel over carpal and cubital tunnels    DATA:  CBC:   Lab Results   Component Value Date/Time    WBC 8.2 01/10/2023 04:00 PM    RBC 4.81 01/10/2023 04:00 PM    HGB 15.2 01/10/2023 04:00 PM    HCT 44.0 01/10/2023 04:00 PM    MCV 91.4 01/10/2023 04:00 PM    MCH 31.6 01/10/2023 04:00 PM    MCHC 34.6 01/10/2023 04:00 PM    RDW 13.7 01/10/2023 04:00 PM     01/10/2023 04:00 PM    MPV 9.3 01/10/2023 04:00 PM     BMP:    Lab Results   Component Value Date/Time     01/10/2023 04:00 PM    K 4.2 01/10/2023 04:00 PM     01/10/2023 04:00 PM    CO2 25 01/10/2023 04:00 PM    BUN 19 01/10/2023 04:00 PM    LABALBU 4.1 01/10/2023 04:00 PM    CREATININE 0.9 01/10/2023 04:00 PM    CALCIUM 8.7 01/10/2023 04:00 PM    GFRAA >60 03/22/2022 10:57 AM    LABGLOM >60 01/10/2023 04:00 PM    GLUCOSE 78 01/10/2023 04:00 PM     PT/INR:    Lab Results   Component Value Date/Time    PROTIME 12.3 04/02/2019 01:13 PM    INR 1.08 04/02/2019 01:13 PM       Radiology:  No orders to display         ASSESSMENT: left carpal and cubital tunnel syndrome    PLAN:  1)  to OR for left cubital and carpal releases  2)  marked, npo, consetned  3)  The risks and benefits were discussed thoroughly again. These included, but were not limited to infection, tendon or nerve injury, scar or thenar sensitivity, need for transfusion, adverse effects of anesthesia, incomplete relief of numbness, pain, and/or weakness. All questions and concerns were addressed today. Patient is in agreement with the plan. Pedro Gonzalez MD  Hand & Upper Extremity Surgery            Please note that this transcription was created using voice recognition software. Any errors are unintentional and may be due to voice recognition transcription.

## 2023-02-17 NOTE — ANESTHESIA POSTPROCEDURE EVALUATION
Department of Anesthesiology  Postprocedure Note    Patient: Isabella Garcia  MRN: [id number]  YOB: 1990  Date of evaluation: 2/17/2023      Procedure Summary     Date: 02/17/23 Room / Location: [facility] OR  / [facility]    Anesthesia Start: 0837 Anesthesia Stop: 1894    Procedure: LEFT CUBITIAL TUNNEL RELEASE WITH SCAR BARRIER, LEFT CARPAL TUNNEL REVISION WITH SCAR BARRIER (Left: Arm Lower) Diagnosis:       Carpal tunnel syndrome on left      Cubital tunnel syndrome on left      (Carpal tunnel syndrome on left [G56.02])      (Cubital tunnel syndrome on left [G56.22])    Surgeons: Attila Larson MD Responsible Provider: Alyssa Mooney MD    Anesthesia Type: general ASA Status: 2          Anesthesia Type: No value filed.     Alvaro Phase I: Alvaro Score: 10    Alvaro Phase II: Alvaro Score: 10      Anesthesia Post Evaluation    Comments: Postoperative Anesthesia Note    Name:    Isabella Garcia  MRN:      [id number]    Patient Vitals in the past 12 hrs:  02/17/23 1122, BP:(!) 150/106, Temp:98 °F (36.7 °C), Temp src:Temporal, Pulse:79, Resp:16, SpO2:97 %  02/17/23 1112, BP:(!) 144/108, Pulse:83, Resp:12, SpO2:97 %  02/17/23 1102, BP:(!) 149/108, Pulse:84, Resp:16, SpO2:96 %  02/17/23 1051, BP:115/82, Pulse:69, Resp:12, SpO2:94 %  02/17/23 1046, BP:104/67, Pulse:72, Resp:14, SpO2:92 %  02/17/23 1040, BP:107/71, Temp:98 °F (36.7 °C), Temp src:Temporal, Pulse:73, Resp:18, SpO2:92 %  02/17/23 0743, BP:(!) 142/97, Temp:98.6 °F (37 °C), Temp src:Temporal, Pulse:73, Resp:22, SpO2:99 %, Height:6' (1.829 m), Weight:200 lb (90.7 kg)     LABS:    CBC  Lab Results       Component                Value               Date/Time                  WBC                      8.2                 01/10/2023 04:00 PM        HGB                      15.2                01/10/2023 04:00 PM        HCT                      44.0                01/10/2023 04:00 PM        PLT                      211 01/10/2023 04:00 PM   RENAL  Lab Results       Component                Value               Date/Time                  NA                       138                 01/10/2023 04:00 PM        K                        4.2                 01/10/2023 04:00 PM        CL                       105                 01/10/2023 04:00 PM        CO2                      25                  01/10/2023 04:00 PM        BUN                      19                  01/10/2023 04:00 PM        CREATININE               0.9                 01/10/2023 04:00 PM        GLUCOSE                  78                  01/10/2023 04:00 PM   COAGS  Lab Results       Component                Value               Date/Time                  PROTIME                  12.3                04/02/2019 01:13 PM        INR                      1.08                04/02/2019 01:13 PM     Intake & Output:  @81DPBW@    Nausea & Vomiting:  No    Level of Consciousness:  Awake    Pain Assessment:  Adequate analgesia    Anesthesia Complications:  No apparent anesthetic complications    SUMMARY      Vital signs stable  OK to discharge from Stage I post anesthesia care.   Care transferred from Anesthesiology department on discharge from perioperative area

## 2023-02-17 NOTE — ANESTHESIA PRE PROCEDURE
Department of Anesthesiology  Preprocedure Note       Name:  Lucius Castillo   Age:  27 y.o.  :  1990                                          MRN:  [id number]         Date:  2023      Surgeon: Poornima Blanchard):  Lashonda Crane MD    Procedure: Procedure(s):  LEFT CUBITIAL TUNNEL RELEASE WITH POSSIBLE TRANSPOSITION, LEFT CARPAL TUNNEL REVISION WITH SCAR BARRIER    Medications prior to admission:   Prior to Admission medications    Medication Sig Start Date End Date Taking? Authorizing Provider   cariprazine hcl (VRAYLAR) 3 MG CAPS capsule Take 1 capsule by mouth daily 23   Gregory Gimenez DO   dicyclomine (BENTYL) 10 MG capsule Take 1 capsule by mouth 4 times daily 1/10/23   NORM Elena CNP   ondansetron (ZOFRAN) 4 MG tablet Take 1 tablet by mouth every 8 hours as needed for Nausea 1/10/23   NORM Elena CNP   naproxen (NAPROSYN) 500 MG tablet Take 1 tablet by mouth 2 times daily (with meals) 1/10/23   NORM Elena CNP       Current medications:    Current Facility-Administered Medications   Medication Dose Route Frequency Provider Last Rate Last Admin    lidocaine PF 1 % injection 0.3 mL  0.3 mL IntraDERmal Once PRN Alyssia Guevara MD        sodium chloride flush 0.9 % injection 5-40 mL  5-40 mL IntraVENous 2 times per day Alyssia Guevara MD        sodium chloride flush 0.9 % injection 5-40 mL  5-40 mL IntraVENous PRN Alyssia Guevara MD        0.9 % sodium chloride infusion   IntraVENous PRN Alyssia Guevara MD        ceFAZolin (ANCEF) 2,000 mg in sodium chloride 0.9 % 50 mL IVPB (mini-bag)  2,000 mg IntraVENous Once Lashonda Crane MD           Allergies:     Allergies   Allergen Reactions    Cyclobenzaprine Other (See Comments)    Diclofenac Shortness Of Breath    Sulfamethoxazole-Trimethoprim Shortness Of Breath    Sulfa Antibiotics        Problem List:    Patient Active Problem List   Diagnosis Code    Cobalamin deficiency E53.8    Generalized anxiety disorder F41.1    History of hepatitis C Z86.19    Insomnia G47.00    History of polydrug abuse (HCC) 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.11    Psoriasis L40.9    History of heroin abuse (HCC) F11.11    Vitamin D deficiency E55.9    Chronic right-sided low back pain with right-sided sciatica M54.41, G89.29    Severe episode of recurrent major depressive disorder, without psychotic features (HCC) F33.2    Carpal tunnel syndrome G56.00    Bipolar 1 disorder, depressed, severe (HCC) F31.4    Panic disorder (episodic paroxysmal anxiety) F41.0    Herpes simplex labialis B00.1    Gastritis K29.70    Elevated BP without diagnosis of hypertension R03.0       Past Medical History:        Diagnosis Date    Anxiety     Carpal tunnel syndrome     Depression     Headache     Pneumothorax        Past Surgical History:        Procedure Laterality Date    CHEST TUBE INSERTION         Social History:    Social History     Tobacco Use    Smoking status: Some Days     Packs/day: 1.00     Years: 10.00     Pack years: 10.00     Types: Cigarettes     Last attempt to quit: 2013     Years since quittin.2    Smokeless tobacco: Never   Substance Use Topics    Alcohol use: No     Comment: occasionally                                Ready to quit: Not Answered  Counseling given: Not Answered      Vital Signs (Current):   Vitals:    23 0743   BP: (!) 142/97   Pulse: 73   Resp: 22   Temp: 98.6 °F (37 °C)   TempSrc: Temporal   SpO2: 99%   Weight: 200 lb (90.7 kg)   Height: 6' (1.829 m)                                              BP Readings from Last 3 Encounters:   23 (!) 142/97   01/10/23 131/87   23 114/68       NPO Status:                                                                                 BMI:   Wt Readings from Last 3 Encounters:   23 200 lb (90.7 kg)   01/10/23 200 lb (90.7 kg)   23 208 lb 3.2 oz (94.4 kg)     Body mass index is 27.12 kg/m².     CBC:   Lab Results   Component Value Date/Time    WBC 8.2 01/10/2023 04:00 PM    RBC 4.81 01/10/2023 04:00 PM    HGB 15.2 01/10/2023 04:00 PM    HCT 44.0 01/10/2023 04:00 PM    MCV 91.4 01/10/2023 04:00 PM    RDW 13.7 01/10/2023 04:00 PM     01/10/2023 04:00 PM       CMP:   Lab Results   Component Value Date/Time     01/10/2023 04:00 PM    K 4.2 01/10/2023 04:00 PM     01/10/2023 04:00 PM    CO2 25 01/10/2023 04:00 PM    BUN 19 01/10/2023 04:00 PM    CREATININE 0.9 01/10/2023 04:00 PM    GFRAA >60 03/22/2022 10:57 AM    AGRATIO 1.5 01/10/2023 04:00 PM    LABGLOM >60 01/10/2023 04:00 PM    GLUCOSE 78 01/10/2023 04:00 PM    PROT 6.8 01/10/2023 04:00 PM    CALCIUM 8.7 01/10/2023 04:00 PM    BILITOT <0.2 01/10/2023 04:00 PM    ALKPHOS 60 01/10/2023 04:00 PM    AST 17 01/10/2023 04:00 PM    ALT 23 01/10/2023 04:00 PM       POC Tests: No results for input(s): POCGLU, POCNA, POCK, POCCL, POCBUN, POCHEMO, POCHCT in the last 72 hours.     Coags:   Lab Results   Component Value Date/Time    PROTIME 12.3 04/02/2019 01:13 PM    INR 1.08 04/02/2019 01:13 PM       HCG (If Applicable): No results found for: PREGTESTUR, PREGSERUM, HCG, HCGQUANT     ABGs: No results found for: PHART, PO2ART, RDB7HNV, SDT0KNJ, BEART, J9ZCVOCM     Type & Screen (If Applicable):  No results found for: LABABO, LABRH    Drug/Infectious Status (If Applicable):  No results found for: HIV, HEPCAB    COVID-19 Screening (If Applicable):   Lab Results   Component Value Date/Time    COVID19 Not Detected 03/22/2022 11:00 AM    COVID19 NOT DETECTED 10/01/2020 01:15 PM           Anesthesia Evaluation  Patient summary reviewed no history of anesthetic complications:   Airway: Mallampati: II  TM distance: >3 FB   Neck ROM: full  Mouth opening: > = 3 FB   Dental: normal exam         Pulmonary:normal exam  breath sounds clear to auscultation      (-) COPD, asthma and sleep apnea                           Cardiovascular:  Exercise tolerance: good (>4 METS),       (-) hypertension, CAD,  angina and  GARZA      Rhythm: regular  Rate: normal                    Neuro/Psych:   (+) neuromuscular disease:, headaches:, psychiatric history:   (-) seizures and TIA           GI/Hepatic/Renal:   (+) hepatitis:, liver disease:,      (-) GERD and no renal disease       Endo/Other:        (-) diabetes mellitus               Abdominal:             Vascular: negative vascular ROS. Other Findings:           Anesthesia Plan      general     ASA 2     (I discussed with the patient the risks and benefits of PIV, anesthesia, IV Narcotics, PACU. All questions were answered the patient agrees with the plan and wishes to proceed)  Induction: intravenous.                             Cee Frausto MD   2/17/2023

## 2023-02-17 NOTE — BRIEF OP NOTE
Brief Postoperative Note      Patient: Leah Cook  YOB: 1990  MRN: [id number]    Date of Procedure: 2/17/2023    Pre-Op Diagnosis: Carpal tunnel syndrome on left [G56.02]  Cubital tunnel syndrome on left [G56.22]    Post-Op Diagnosis: Same       Procedure(s):  LEFT CUBITIAL TUNNEL RELEASE WITH SCAR BARRIER, LEFT CARPAL TUNNEL REVISION WITH SCAR BARRIER  No transposition left elbow    Surgeon(s):  Tomasz Wei MD    Assistant:  Surgical Assistant: Ricarda Leal    Anesthesia: General and local    Estimated Blood Loss (mL): less than 50     Complications: None    Specimens:   * No specimens in log *    Implants:  Implant Name Type Inv.  Item Serial No.  Lot No. LRB No. Used Action   Wynema Mixer 2X3CM 6SQ CM - [id number]  Wynema Mixer 2X3CM 4ZK  [id number] [employer] [id number] Left 1 Implanted   NUSHIELD 2X3CM [place] 7 CM - [id number]  Wynema Mixer 2X3CM 6SQ  [id number] [employer] [id number] Left 1 Implanted         Drains: * No LDAs found *    Findings: see full op note    Electronically signed by Tomasz Wei MD on 2/17/2023 at 10:30 AM

## 2023-02-20 ENCOUNTER — HOSPITAL ENCOUNTER (EMERGENCY)
Age: 33
Discharge: LEFT BEFORE DISCHARGE | End: 2023-02-20
Attending: EMERGENCY MEDICINE
Payer: COMMERCIAL

## 2023-02-20 VITALS
RESPIRATION RATE: 18 BRPM | TEMPERATURE: 98.4 F | HEART RATE: 85 BPM | SYSTOLIC BLOOD PRESSURE: 158 MMHG | OXYGEN SATURATION: 99 % | DIASTOLIC BLOOD PRESSURE: 117 MMHG

## 2023-02-20 DIAGNOSIS — G89.18 POST-OP PAIN: Primary | ICD-10-CM

## 2023-02-20 PROCEDURE — 99281 EMR DPT VST MAYX REQ PHY/QHP: CPT

## 2023-02-20 RX ORDER — KETOROLAC TROMETHAMINE 30 MG/ML
30 INJECTION, SOLUTION INTRAMUSCULAR; INTRAVENOUS ONCE
Status: DISCONTINUED | OUTPATIENT
Start: 2023-02-20 | End: 2023-02-20 | Stop reason: HOSPADM

## 2023-02-20 RX ORDER — OXYCODONE HYDROCHLORIDE AND ACETAMINOPHEN 5; 325 MG/1; MG/1
1 TABLET ORAL ONCE
Status: DISCONTINUED | OUTPATIENT
Start: 2023-02-20 | End: 2023-02-20 | Stop reason: HOSPADM

## 2023-02-20 ASSESSMENT — PAIN SCALES - GENERAL: PAINLEVEL_OUTOF10: 8

## 2023-02-20 ASSESSMENT — PAIN - FUNCTIONAL ASSESSMENT: PAIN_FUNCTIONAL_ASSESSMENT: 0-10

## 2023-02-20 ASSESSMENT — PAIN DESCRIPTION - LOCATION: LOCATION: WRIST

## 2023-02-20 ASSESSMENT — PAIN DESCRIPTION - ORIENTATION: ORIENTATION: LEFT

## 2023-02-20 NOTE — ED NOTES
Attempted to call back to get medication, pt was not found in jack Resendez, FELISHA  02/20/23 1600

## 2023-02-20 NOTE — ED PROVIDER NOTES
[facility]  ED  EMERGENCY DEPARTMENT ENCOUNTER        Pt Name: Oksana aBrnes  MRN: [id number]  [place] 1990  Date of evaluation: 2/20/2023  Provider: Loy Barnes PA-C  PCP: Yoel Godinez MD  Note Started: 3:40 PM EST 2/20/23       I have seen and evaluated this patient with my supervising physician Na Ball MD.      [address] [place] [address]       Chief Complaint   Patient presents with    Post-op Problem     Had surgery 3 days ago, carpal tunnel surgery, on left elbow and wrist, increase pain, has percocet at home and took this morning but it upsets his stomach       HISTORY OF PRESENT ILLNESS: 1 or more Elements     History From: patient  Limitations to history : None    Oksana Barnes is a 27 y.o. male who presents to the emergency department with a chief complaint of left arm pain. 3 days ago had carpal tunnel release and left cubital tunnel release by Dr. Bin Majano. He has been taking Percocet at home and last had this about 7.5 hours before I see him. He states he woke up with worsening pain from his wrist to his elbow this morning with some bloody discharge coming from the wound on his left hand. He is right-hand dominant. Denies nausea, vomiting, fevers, swelling, color change or purulent drainage. Nursing Notes were all reviewed and agreed with or any disagreements were addressed in the HPI. REVIEW OF SYSTEMS :      Review of Systems    Positives and Pertinent negatives as per HPI.      SURGICAL HISTORY     Past Surgical History:   Procedure Laterality Date    ARM SURGERY Left     ARM SURGERY Left 2/17/2023    LEFT CUBITIAL TUNNEL RELEASE WITH SCAR BARRIER, LEFT CARPAL TUNNEL REVISION WITH SCAR BARRIER performed by Concepción Floyd MD at [address] [place] [address] Bilateral     CHEST TUBE INSERTION      OTHER SURGICAL HISTORY Left 02/17/2023    LEFT CUBITIAL TUNNEL RELEASE WITH SCAR BARRIER, LEFT CARPAL TUNNEL REVISION WITH SCAR BARRIER       CURRENTMEDICATIONS       Discharge Medication List as of 2023  4:03 PM        CONTINUE these medications which have NOT CHANGED    Details   diphenhydrAMINE (BENADRYL) 12.5 MG/5ML elixir Take by mouth 4 times daily as needed for AllergiesHistorical Med      oxyCODONE-acetaminophen (PERCOCET) 5-325 MG per tablet Take 1 tablet by mouth every 8 hours as needed for Pain for up to 7 days.  Max Daily Amount: 3 tablets, Disp-21 tablet, R-0Normal      cariprazine hcl (VRAYLAR) 3 MG CAPS capsule Take 1 capsule by mouth daily, Disp-30 capsule, R-0Normal      dicyclomine (BENTYL) 10 MG capsule Take 1 capsule by mouth 4 times daily, Disp-30 capsule, R-1Normal      ondansetron (ZOFRAN) 4 MG tablet Take 1 tablet by mouth every 8 hours as needed for Nausea, Disp-20 tablet, R-0Normal      naproxen (NAPROSYN) 500 MG tablet Take 1 tablet by mouth 2 times daily (with meals), Disp-30 tablet, R-0Normal             ALLERGIES     Cyclobenzaprine, Diclofenac, Sulfamethoxazole-trimethoprim, and Sulfa antibiotics    FAMILYHISTORY       Family History   Problem Relation Age of Onset    Diabetes Father     Heart Attack Maternal Grandmother     Heart Disease Maternal Grandmother     Prostate Cancer Maternal Grandfather     Cancer Paternal Grandfather     Prostate Cancer Paternal Grandfather         SOCIAL HISTORY       Social History     Tobacco Use    Smoking status: Some Days     Packs/day: 0.50     Years: 10.00     Pack years: 5.00     Types: Cigarettes     Last attempt to quit: 2013     Years since quittin.2    Smokeless tobacco: Never   Vaping Use    Vaping Use: Never used   Substance Use Topics    Alcohol use: No     Comment: occasionally    Drug use: No     Comment: past history of heroin use       SCREENINGS                         WA Assessment  BP: (!) 158/117  Heart Rate: 85           PHYSICAL EXAM  1 or more Elements     ED Triage Vitals   BP Temp Temp Source Heart Rate Resp SpO2 Height Weight 02/20/23 1524 02/20/23 1522 02/20/23 1522 02/20/23 1522 02/20/23 1522 02/20/23 1522 -- --   (!) 158/117 98.4 °F (36.9 °C) Oral 85 18 99 %         Physical Exam  Vitals and nursing note reviewed. Constitutional:       Appearance: He is well-developed. He is not diaphoretic. HENT:      Head: Atraumatic. Nose: Nose normal.   Eyes:      General:         Right eye: No discharge. Left eye: No discharge. Cardiovascular:      Pulses: Normal pulses. Comments: 2+ radial pulse in left wrist  Musculoskeletal:         General: Tenderness present. Cervical back: Normal range of motion. Comments: There are some generalized subjective tenderness around the left forearm without pitting edema, warmth, erythema or purulent drainage. Incisions over the left elbow and left hand are clean dry and intact. Capillary refill brisk. Sensation light touch distally intact. Skin:     General: Skin is warm and dry. Findings: No erythema or rash. Neurological:      Mental Status: He is alert and oriented to person, place, and time. Cranial Nerves: No cranial nerve deficit. Psychiatric:         Behavior: Behavior normal.           DIAGNOSTIC RESULTS   LABS:    Labs Reviewed - No data to display    When ordered only abnormal lab results are displayed. All other labs were within normal range or not returned as of this dictation. EKG: When ordered, EKG's are interpreted by the Emergency Department Physician in the absence of a cardiologist.  Please see their note for interpretation of EKG. RADIOLOGY:   Non-plain film images such as CT, Ultrasound and MRI are read by the radiologist. Plain radiographic images are visualized and preliminarily interpreted by the ED Provider with the below findings:        Interpretation per the Radiologist below, if available at the time of this note:    No orders to display     No results found. No results found.     PROCEDURES   Unless otherwise noted below, none     Procedures    CRITICAL CARE TIME (.cctime)       PAST MEDICAL HISTORY      has a past medical history of Anxiety, Carpal tunnel syndrome, Depression, Headache, and Pneumothorax. EMERGENCY DEPARTMENT COURSE and DIFFERENTIAL DIAGNOSIS/MDM:   Vitals:    Vitals:    02/20/23 1522 02/20/23 1524   BP:  (!) 158/117   Pulse: 85    Resp: 18    Temp: 98.4 °F (36.9 °C)    TempSrc: Oral    SpO2: 99%        Patient was given the following medications:  Medications - No data to display            Is this patient to be included in the SEP-1 Core Measure due to severe sepsis or septic shock? No   Exclusion criteria - the patient is NOT to be included for SEP-1 Core Measure due to: Infection is not suspected    Chronic Conditions affecting care:    has a past medical history of Anxiety, Carpal tunnel syndrome, Depression, Headache, and Pneumothorax. CONSULTS: (Who and What was discussed)  None      Social Determinants Significantly Affecting Health : None    Records Reviewed (External and Source)     CC/HPI Summary, DDx, ED Course, and Reassessment: Patient presented with left elbow and wrist pain. Just had surgery performed 3 days ago. Low suspicion for DVT, cellulitis, abscess or postop infection. Was given IM dose of Toradol here along with Percocet as he has not had it for 7.5 hours. Do not believe x-ray imaging or further work-up or testing is warranted this time. Educated on use of NSAIDs at home. Return here for any worsening of symptoms or problems no. Do not believe antibiotics are warranted. Message was sent through [facility] to his surgeon. Disposition Considerations (tests considered but not done, Admit vs D/C, Shared Decision Making, Pt Expectation of Test or Tx.):        I am the Primary Clinician of Record. FINAL IMPRESSION      1.  Post-op pain          DISPOSITION/PLAN     DISPOSITION Discharge - Pending Orders Complete 02/20/2023 03:52:24 PM      PATIENT REFERRED TO:  Faviola Hamlin [address] [place] [address], [address] [place] [address] [address]    Call   As needed    [facility]  ED  [facility]  [address]  [phone]    As needed    DISCHARGE MEDICATIONS:  Discharge Medication List as of 2/20/2023  4:03 PM          DISCONTINUED MEDICATIONS:  Discharge Medication List as of 2/20/2023  4:03 PM                 (Please note that portions of this note were completed with a voice recognition program.  Efforts were made to edit the dictations but occasionally words are mis-transcribed.)    Christine Tubbs PA-C (electronically signed)       Christine Tubbs PA-C  02/20/23 1738

## 2023-02-22 NOTE — TELEPHONE ENCOUNTER
Refill Request     CONFIRM preferrred pharmacy with the patient. If Mail Order Rx - Pend for 90 day refill. Last Seen: Last Seen Department: 1/4/2023  Last Seen by PCP: 9/27/2022    Last Written: 1/17/2023 30 tabs 0 refills    If no future appointment scheduled, route STAFF MESSAGE with patient name to the [facility] for scheduling. Next Appointment:   Future Appointments   Date Time Provider Adonis Browni   3/7/2023  9:30 AM MD GAYATRI Guy  [facility] - DYD       Message sent to [address] [place] [address] to schedule appt with patient?   NO      Requested Prescriptions     Pending Prescriptions Disp Refills    cariprazine hcl (VRAYLAR) 3 MG CAPS capsule 30 capsule 0     Sig: Take 1 capsule by mouth daily

## 2023-02-22 NOTE — TELEPHONE ENCOUNTER
----- Message from Roberts Chapel sent at 2/22/2023  1:02 PM EST -----  Subject: Refill Request    QUESTIONS  Name of Medication? cariprazine hcl (VRAYLAR) 3 MG CAPS capsule  Patient-reported dosage and instructions? 3 MG once daily  How many days do you have left? 2  [facility]erred Pharmacy? [facility] [id number]  Pharmacy phone number (if available)? [phone]  ---------------------------------------------------------------------------  --------------  Zora Bosworth INFO  What is the best way for the office to contact you? OK to leave message on   voicemail  Preferred Call Back Phone Number? [phone]  ---------------------------------------------------------------------------  --------------  SCRIPT ANSWERS  Relationship to Patient?  Self

## 2023-02-25 NOTE — ED PROVIDER NOTES
I independently examined and evaluated Saundra Varela. All diagnostic, treatment, and disposition decisions were made by myself in conjunction with the ANDREW, BLAINE Shabazz. . For all further details of the patient's emergency department visit, please see their documentation. 58-year-old male who is 3 days postop from carpal tunnel release performed by Dr. Didier Umanzor presents stating he woke up with worsening pain from his left wrist to his elbow. He also had some bloody discharge from the wound. Surgery was on his left wrist and he is right-hand dominant. He states he had the prior surgery done on his right hand and did not have these problems. His last Percocet was 7-1/2 hours prior to arrival.  He denies fevers. He denies swelling in the arm or hand. Vitals:    02/20/23 1524   BP: (!) 158/117   Pulse:    Resp:    Temp:    SpO2:    Here is incision is clean, dry and intact. There is small amount of dried blood around the incision but it is not erythematous, warm or swollen. The arm is neurovascularly intact. No swelling of the proximal arm, hand or forearm. No results found for this visit on 02/20/23. No orders to display           I personally saw and performed a substantive portion of the visit including all aspects of the medical decision making. MDM:    Here the patient is well-appearing. I think this would certainly be too soon to develop a DVT and clinically he does not have signs or symptoms of a DVT. He may have had a small postop seroma that ruptured and caused some pain and bleeding. He is not bleeding currently. No evidence of infection based on exam.  We will message Dr. Didier Umanzor to make them aware but do think the patient is appropriate for discharge home. I personally saw the patient and independently provided 0 minutes of nonconcurrent critical care out of the total shared critical care time provided.         Cindy Pires MD  02/25/23 6122

## 2023-03-07 ENCOUNTER — TELEPHONE (OUTPATIENT)
Dept: FAMILY MEDICINE CLINIC | Age: 33
End: 2023-03-07

## 2023-03-07 NOTE — TELEPHONE ENCOUNTER
No shows:  6/2/21Jacinto Garrido NP  8/6/21- Dr. Suzan Garcia  10/11/22 and 11/8/22- Dr. Manuelito Wright  3/7/23- Dr. Suzan Garcia    Late Cancel:  6/24/21 and 8/5/21 - Dr. Suzan Garcia  12/13/22- Maryanne Leer       Per Dr. Suzan Garcia- please dismiss, thanks.

## 2023-03-07 NOTE — LETTER
[address]  Dept: [phone]  Dept Fax: [phone]: [phone]      3/8/2023    Dear Tish Vasquez,    I find it necessary to inform you that I must withdraw my professional commitment to you as a primary care physician due to a breakdown in the doctor/patient relationship. You have missed or late canceled several appointments with the practice. It is essential that you continue to receive medical care for your condition. Therefore, I recommend you make immediate arrangements with another physician to provide the needed care. For emergency medical services, please go to the nearest emergency room for treatment. If you wish, I will continue to treat your urgent medical needs which may develop for the following thirty (30) days from today's date. Enclosed is a form authorizing me to release a copy of your medical records to your new treating physician. I will forward your records promptly upon receipt of this form, signed by you, with completed name and address of the physician to receive your records. If you have any questions regarding the contents of this letter, you may reach me at my office during normal business hours.     Respectfully,        Adelita Stratton MD

## 2023-03-08 NOTE — TELEPHONE ENCOUNTER
Letters printed for PCP to sign. Will be sent reg and registered mail.     Pt dismissed from practice

## 2023-03-27 DIAGNOSIS — L40.0 PLAQUE PSORIASIS: ICD-10-CM

## 2023-03-27 RX ORDER — CLOBETASOL PROPIONATE 0.5 MG/ML
1 LOTION TOPICAL 2 TIMES DAILY
Qty: 236 ML | Refills: 1 | Status: SHIPPED | OUTPATIENT
Start: 2023-03-27 | End: 2023-04-10

## 2023-03-27 NOTE — TELEPHONE ENCOUNTER
Refill Request     CONFIRM preferred pharmacy with the patient. If Mail Order Rx - Pend for 90 day refill. Last Seen: Last Seen Department: 1/4/2023  Last Seen by PCP: 9/27/2022    Last Written: 2/22/23 30 tabs 0 refills    If no future appointment scheduled, route STAFF MESSAGE with patient name to the [facility] for scheduling. Next Appointment:   No future appointments. Message sent to [address] [place] [address] to schedule appt with patient?   NO      Requested Prescriptions     Pending Prescriptions Disp Refills    cariprazine hcl (VRAYLAR) 3 MG CAPS capsule 30 capsule 0     Sig: Take 1 capsule by mouth daily

## 2023-03-27 NOTE — TELEPHONE ENCOUNTER
----- Message from Magbrody Mckoy sent at 3/27/2023  3:08 PM EDT -----  Subject: Refill Request    QUESTIONS  Name of Medication? cariprazine hcl (VRAYLAR) 3 MG CAPS capsule  Patient-reported dosage and instructions? 1 time a day 3 MG  How many days do you have left? 3  [id number]  Pharmacy phone number (if available)? [phone]  ---------------------------------------------------------------------------  --------------,  Name of Medication? Other - Clovatesol  Patient-reported dosage and instructions? 4 times daily  How many days do you have left? 0  Preferred Pharmacy? [place] [facility] [id number]  Pharmacy phone number (if available)? [phone]  ---------------------------------------------------------------------------  --------------  Sandro SWIFT  What is the best way for the office to contact you? OK to leave message on   voicemail  Preferred Call Back Phone Number? [phone]  ---------------------------------------------------------------------------  --------------  SCRIPT ANSWERS  Relationship to Patient?  Self

## 2023-07-31 ENCOUNTER — HOSPITAL ENCOUNTER (EMERGENCY)
Age: 33
Discharge: HOME | End: 2023-07-31

## 2023-09-06 ENCOUNTER — HOSPITAL ENCOUNTER (EMERGENCY)
Age: 33
Discharge: HOME | End: 2023-09-06
Payer: COMMERCIAL

## 2023-09-06 VITALS
HEIGHT: 72 IN | BODY MASS INDEX: 28.38 KG/M2 | TEMPERATURE: 97.9 F | OXYGEN SATURATION: 99 % | DIASTOLIC BLOOD PRESSURE: 89 MMHG | RESPIRATION RATE: 16 BRPM | HEART RATE: 68 BPM | SYSTOLIC BLOOD PRESSURE: 139 MMHG | WEIGHT: 209.5 LBS

## 2023-09-06 DIAGNOSIS — M79.604 RIGHT LEG PAIN: Primary | ICD-10-CM

## 2023-09-06 PROCEDURE — 99284 EMERGENCY DEPT VISIT MOD MDM: CPT

## 2023-09-06 PROCEDURE — 93971 EXTREMITY STUDY: CPT

## 2023-09-06 RX ORDER — NAPROXEN 500 MG/1
500 TABLET ORAL 2 TIMES DAILY WITH MEALS
Qty: 30 TABLET | Refills: 0 | Status: SHIPPED | OUTPATIENT
Start: 2023-09-06

## 2023-09-06 ASSESSMENT — LIFESTYLE VARIABLES
HOW OFTEN DO YOU HAVE A DRINK CONTAINING ALCOHOL: NEVER
HOW MANY STANDARD DRINKS CONTAINING ALCOHOL DO YOU HAVE ON A TYPICAL DAY: PATIENT DOES NOT DRINK

## 2023-09-06 ASSESSMENT — PAIN DESCRIPTION - DESCRIPTORS: DESCRIPTORS: STABBING

## 2023-09-06 ASSESSMENT — PAIN DESCRIPTION - PAIN TYPE: TYPE: ACUTE PAIN

## 2023-09-06 ASSESSMENT — PAIN DESCRIPTION - LOCATION: LOCATION: LEG

## 2023-09-06 ASSESSMENT — PAIN DESCRIPTION - FREQUENCY: FREQUENCY: CONTINUOUS

## 2023-09-06 ASSESSMENT — PAIN - FUNCTIONAL ASSESSMENT: PAIN_FUNCTIONAL_ASSESSMENT: 0-10

## 2023-09-06 ASSESSMENT — PAIN SCALES - GENERAL: PAINLEVEL_OUTOF10: 8

## 2023-09-06 NOTE — ED PROVIDER NOTES
[address] [address] ED  EMERGENCY DEPARTMENT ENCOUNTER        Pt Name: Mendel Pod  MRN: [id number]  [address] [facility] 1990  Date of evaluation: 9/6/2023  Provider: Brianne Zavala PA-C  PCP: No primary care provider on file. Note Started: 9:37 AM EDT 9/6/23      ANDREW. I have evaluated this patient. CHIEF COMPLAINT       Chief Complaint   Patient presents with    Leg Pain     Pt states pain in right calf x 1 week. Pt states pain gets worse at night. Denies any long trips or injury to leg, states soot began to swell some last night. HISTORY OF PRESENT ILLNESS: 1 or more Elements     History From: patient    Mendel Pod is a 27 y.o. male who presents complaining of right calf pain for 1 week. Patient reports pain in the posterior mid calf for 1 week, worse with walking, relieved by rest, worse at night and when he wakes up, states it swells at night and then is better by the morning. Denies trauma, prior injury, wounds, redness, back pain, prior DVT/PE. Patient's entire right lower leg is covered in a sleeve tattoo, denies any recent tattoo work. Patient concerned about DVT, denies prior history. Nursing Notes were all reviewed and agreed with or any disagreements were addressed in the HPI. REVIEW OF SYSTEMS :      Review of Systems   All other systems reviewed and are negative. Positives and Pertinent negatives as per HPI. PAST MEDICAL HISTORY    has a past medical history of Anxiety, Carpal tunnel syndrome, Depression, Headache, and Pneumothorax.      SURGICAL HISTORY     Past Surgical History:   Procedure Laterality Date    ARM SURGERY Left     ARM SURGERY Left 2/17/2023    LEFT CUBITIAL TUNNEL RELEASE WITH SCAR BARRIER, LEFT CARPAL TUNNEL REVISION WITH SCAR BARRIER performed by Akhil Fontanez MD at [address] [place] [address] Bilateral     CHEST TUBE INSERTION      OTHER SURGICAL HISTORY Left 02/17/2023    LEFT CUBITIAL TUNNEL RELEASE

## 2023-09-06 NOTE — ED NOTES
6498- [facility] called and said it will be an hour and a half before they can come down      [facility]  09/06/23 7808

## 2023-09-17 NOTE — TELEPHONE ENCOUNTER
Please call and let pt or SO know that the medication he was prescribed last visit with me was on the \"ok to use\" list of his genesite testing. Just wanted to let them know. Thanks. Received pt AAO and in NAD.  Pt breathing E/U on room air. Pt and family advised of plan of care to include all test and procedures. Patient stable at this time; will continue with plan of care.

## 2023-11-16 ENCOUNTER — HOSPITAL ENCOUNTER (EMERGENCY)
Age: 33
Discharge: HOME | End: 2023-11-16
Attending: EMERGENCY MEDICINE
Payer: COMMERCIAL

## 2023-11-16 VITALS
TEMPERATURE: 98.2 F | SYSTOLIC BLOOD PRESSURE: 165 MMHG | DIASTOLIC BLOOD PRESSURE: 99 MMHG | OXYGEN SATURATION: 99 % | HEART RATE: 65 BPM | RESPIRATION RATE: 16 BRPM | HEIGHT: 72 IN | BODY MASS INDEX: 28.31 KG/M2 | WEIGHT: 209 LBS

## 2023-11-16 DIAGNOSIS — F31.9 BIPOLAR 1 DISORDER (HCC): Primary | ICD-10-CM

## 2023-11-16 LAB
ALBUMIN SERPL-MCNC: 5 G/DL (ref 3.4–5)
ALBUMIN/GLOB SERPL: 1.6 {RATIO} (ref 1.1–2.2)
ALP SERPL-CCNC: 56 U/L (ref 40–129)
ALT SERPL-CCNC: 27 U/L (ref 10–40)
AMPHETAMINES UR QL SCN>1000 NG/ML: ABNORMAL
ANION GAP SERPL CALCULATED.3IONS-SCNC: 10 MMOL/L (ref 3–16)
APAP SERPL-MCNC: <5 UG/ML (ref 10–30)
AST SERPL-CCNC: 23 U/L (ref 15–37)
BARBITURATES UR QL SCN>200 NG/ML: ABNORMAL
BASOPHILS # BLD: 0 K/UL (ref 0–0.2)
BASOPHILS NFR BLD: 0.6 %
BENZODIAZ UR QL SCN>200 NG/ML: ABNORMAL
BILIRUB SERPL-MCNC: 0.7 MG/DL (ref 0–1)
BUN SERPL-MCNC: 17 MG/DL (ref 7–20)
CALCIUM SERPL-MCNC: 9.3 MG/DL (ref 8.3–10.6)
CANNABINOIDS UR QL SCN>50 NG/ML: POSITIVE
CHLORIDE SERPL-SCNC: 103 MMOL/L (ref 99–110)
CO2 SERPL-SCNC: 27 MMOL/L (ref 21–32)
COCAINE UR QL SCN: ABNORMAL
CREAT SERPL-MCNC: 1 MG/DL (ref 0.9–1.3)
DEPRECATED RDW RBC AUTO: 13.8 % (ref 12.4–15.4)
DRUG SCREEN COMMENT UR-IMP: ABNORMAL
EOSINOPHIL # BLD: 0.2 K/UL (ref 0–0.6)
EOSINOPHIL NFR BLD: 2.4 %
ETHANOLAMINE SERPL-MCNC: NORMAL MG/DL (ref 0–0.08)
FENTANYL SCREEN, URINE: ABNORMAL
GFR SERPLBLD CREATININE-BSD FMLA CKD-EPI: >60 ML/MIN/{1.73_M2}
GLUCOSE SERPL-MCNC: 89 MG/DL (ref 70–99)
HCT VFR BLD AUTO: 50.7 % (ref 40.5–52.5)
HGB BLD-MCNC: 17 G/DL (ref 13.5–17.5)
LYMPHOCYTES # BLD: 1.6 K/UL (ref 1–5.1)
LYMPHOCYTES NFR BLD: 22.1 %
MCH RBC QN AUTO: 31.1 PG (ref 26–34)
MCHC RBC AUTO-ENTMCNC: 33.4 G/DL (ref 31–36)
MCV RBC AUTO: 93 FL (ref 80–100)
METHADONE UR QL SCN>300 NG/ML: ABNORMAL
MONOCYTES # BLD: 0.6 K/UL (ref 0–1.3)
MONOCYTES NFR BLD: 8.5 %
NEUTROPHILS # BLD: 4.8 K/UL (ref 1.7–7.7)
NEUTROPHILS NFR BLD: 66.4 %
OPIATES UR QL SCN>300 NG/ML: ABNORMAL
OXYCODONE UR QL SCN: ABNORMAL
PCP UR QL SCN>25 NG/ML: ABNORMAL
PH UR STRIP: 5 [PH]
PLATELET # BLD AUTO: 205 K/UL (ref 135–450)
PMV BLD AUTO: 9.1 FL (ref 5–10.5)
POTASSIUM SERPL-SCNC: 3.8 MMOL/L (ref 3.5–5.1)
PROT SERPL-MCNC: 8.2 G/DL (ref 6.4–8.2)
RBC # BLD AUTO: 5.45 M/UL (ref 4.2–5.9)
SALICYLATES SERPL-MCNC: <0.3 MG/DL (ref 15–30)
SODIUM SERPL-SCNC: 140 MMOL/L (ref 136–145)
WBC # BLD AUTO: 7.3 K/UL (ref 4–11)

## 2023-11-16 PROCEDURE — 82077 ASSAY SPEC XCP UR&BREATH IA: CPT

## 2023-11-16 PROCEDURE — 80143 DRUG ASSAY ACETAMINOPHEN: CPT

## 2023-11-16 PROCEDURE — 36415 COLL VENOUS BLD VENIPUNCTURE: CPT

## 2023-11-16 PROCEDURE — 6370000000 HC RX 637 (ALT 250 FOR IP): Performed by: EMERGENCY MEDICINE

## 2023-11-16 PROCEDURE — 85025 COMPLETE CBC W/AUTO DIFF WBC: CPT

## 2023-11-16 PROCEDURE — 80307 DRUG TEST PRSMV CHEM ANLYZR: CPT

## 2023-11-16 PROCEDURE — 80053 COMPREHEN METABOLIC PANEL: CPT

## 2023-11-16 PROCEDURE — 80179 DRUG ASSAY SALICYLATE: CPT

## 2023-11-16 PROCEDURE — 99283 EMERGENCY DEPT VISIT LOW MDM: CPT

## 2023-11-16 RX ORDER — LORAZEPAM 1 MG/1
1 TABLET ORAL ONCE
Status: COMPLETED | OUTPATIENT
Start: 2023-11-16 | End: 2023-11-16

## 2023-11-16 RX ORDER — ARIPIPRAZOLE 5 MG/1
5 TABLET ORAL DAILY
COMMUNITY
Start: 2023-09-28

## 2023-11-16 RX ORDER — LORAZEPAM 2 MG/1
2 TABLET ORAL ONCE
Status: COMPLETED | OUTPATIENT
Start: 2023-11-16 | End: 2023-11-16

## 2023-11-16 RX ORDER — BUPROPION HYDROCHLORIDE 150 MG/1
150 TABLET ORAL DAILY
COMMUNITY
Start: 2023-10-02

## 2023-11-16 RX ADMIN — LORAZEPAM 1 MG: 1 TABLET ORAL at 16:55

## 2023-11-16 RX ADMIN — LORAZEPAM 2 MG: 2 TABLET ORAL at 15:46

## 2023-11-16 ASSESSMENT — ENCOUNTER SYMPTOMS
TROUBLE SWALLOWING: 0
SHORTNESS OF BREATH: 0
WHEEZING: 0
VOICE CHANGE: 0

## 2023-11-16 ASSESSMENT — PAIN - FUNCTIONAL ASSESSMENT: PAIN_FUNCTIONAL_ASSESSMENT: NONE - DENIES PAIN

## 2023-11-16 NOTE — ED PROVIDER NOTES
[address] [address] ED  eMERGENCY dEPARTMENT eNCOUnter      Pt Name: Jeremi Schultz  MRN: [id number]  [address] [facility] 1990  Date of evaluation: 11/16/2023  Provider: Vishnu Davies MD    CHIEF COMPLAINT       Chief Complaint   Patient presents with    Psychiatric Evaluation     Pt to ED with complaint of being manic. Pt states he does have hx of bipolar and has not been able to sleep or eat in 3 days. Pt is currently calm and cooperative in triage. Answering all questions. Denies any SI or HI. Pt thinks he is dehydrated. Pt states he has been taking all of his psych meds as prescribed. HISTORY OF PRESENT ILLNESS   (Location/Symptom, Timing/Onset, Context/Setting, Quality, Duration, Modifying Factors, Severity)  Note limiting factors. History obtained from: The patient    Jeremi Schultz is a 26 y.o. male with hx of bipolar disorder who reports he is fully compliant with his medications including Abilify, Wellbutrin, and Alen Redhead who presents because for the past 3 days he reports extreme difficulty sleeping and some constant racing thoughts. Patient denies any hallucinations SI HI or risky behaviors but simply reports the inability to sleep is bothering him and believes this could be the beginning of a manic phase. Patient reports that he does not want to be admitted and does not think he is a threat to himself or others but would like psychiatric evaluation. Patient also reports that he feels dehydrated but denies any vomiting or inability to tolerate food and drink. HPI    Nursing Notes were reviewed. REVIEW OFSYSTEMS    (2-9 systems for level 4, 10 or more for level 5)     Review of Systems   Constitutional:  Negative for fever. HENT:  Negative for drooling, trouble swallowing and voice change. Eyes:  Negative for visual disturbance. Respiratory:  Negative for shortness of breath and wheezing. Cardiovascular:  Negative for chest pain and palpitations.    Neurological:

## 2023-11-16 NOTE — ED NOTES
Level of Care Disposition: Discharge        Patient was seen by ED provider and SW/Nursing staff. The case was presented to psychiatric provider on-call Dr. Lizzie Costa.   Based on the ED evaluation and information presented to the provider by [facility] , it is the recommendation of the on call psychiatric provider that the patient be discharged from a psychiatric standpoint with the following referrals: community mental health resources        Hattie Cardenas, [address] [facility]  11/16/23 3741

## 2023-11-16 NOTE — ED NOTES
Presenting Problem:Patient presents to the ED on voluntarily by personal transport. Pt reports being diagnosed with bipolar 1 and feels that he is in a manic episode. Pt reports that when he is manic, he does not sleep or eat well. Pt reports that he hasn't slept in three days and is hoping for medication to help sleep. Pt denies SI/HI. Appearance/Hygiene:  well-appearing, hospital attire, seated in bed, good grooming, and good hygiene   Motor Behavior: WNL   Attitude: cooperative  Affect: normal affect   Speech: normal pitch and normal volume  Mood: within normal limits   Thought Processes: Goal directed  Perceptions: Absent   Thought content:  future oriented   Orientation: A&Ox4   Memory: intact  Concentration: Good    Insight/ judgement: normal insight and judgment    Psychosocial and contextual factors: Pt is a 26year old male who presents at the ED with insomnia and reported he is in a manic episode. Pt reports that he experiences kathie about 2x/year. Pt reports during the episode, he cannot sleep or eat well. Pt works as a  full-time. He currently lives with his girlfriend, daughter, girlfriend's son and girlfriend's sister. Pt reports feeling supported. C-SSRS flowsheet is  Complete. Psychiatric History (including current outpatient provider and past inpatient admissions): Pt reports diagnosis of bipolar 1. He is currently taking vraylar, abilify, and wellbutrin as prescribed by his PCP ([facility]). Last appointment was 10/23/23 per chart review. Pt reports that he came to the ED to get sleep medication because he wasn't able to get in with his primary care and he needs to sleep. Pt reports that he experiences kathie about 2x/year. Pt reports during the episode, he cannot sleep or eat well. Pt reports that he has been inpatient at [facility] about 5 years ago for suicidal ideation but feels he was \"tricked into it because he was joking with the nurse, but they took it seriously\".  Pt

## 2023-11-29 ENCOUNTER — APPOINTMENT (OUTPATIENT)
Dept: CT IMAGING | Age: 33
End: 2023-11-29
Payer: COMMERCIAL

## 2023-11-29 ENCOUNTER — HOSPITAL ENCOUNTER (EMERGENCY)
Age: 33
Discharge: HOME | End: 2023-11-29
Attending: EMERGENCY MEDICINE
Payer: COMMERCIAL

## 2023-11-29 VITALS
WEIGHT: 202 LBS | BODY MASS INDEX: 27.36 KG/M2 | HEART RATE: 77 BPM | DIASTOLIC BLOOD PRESSURE: 63 MMHG | SYSTOLIC BLOOD PRESSURE: 102 MMHG | TEMPERATURE: 97.6 F | OXYGEN SATURATION: 95 % | RESPIRATION RATE: 16 BRPM | HEIGHT: 72 IN

## 2023-11-29 DIAGNOSIS — K92.0 HEMATEMESIS WITH NAUSEA: ICD-10-CM

## 2023-11-29 DIAGNOSIS — R10.13 EPIGASTRIC PAIN: Primary | ICD-10-CM

## 2023-11-29 LAB
ALBUMIN SERPL-MCNC: 4.5 G/DL (ref 3.4–5)
ALBUMIN/GLOB SERPL: 1.4 {RATIO} (ref 1.1–2.2)
ALP SERPL-CCNC: 50 U/L (ref 40–129)
ALT SERPL-CCNC: 28 U/L (ref 10–40)
ANION GAP SERPL CALCULATED.3IONS-SCNC: 14 MMOL/L (ref 3–16)
AST SERPL-CCNC: 45 U/L (ref 15–37)
BASOPHILS # BLD: 0.1 K/UL (ref 0–0.2)
BASOPHILS NFR BLD: 0.7 %
BILIRUB SERPL-MCNC: 0.4 MG/DL (ref 0–1)
BILIRUB UR QL STRIP.AUTO: ABNORMAL
BUN SERPL-MCNC: 18 MG/DL (ref 7–20)
CALCIUM SERPL-MCNC: 8.7 MG/DL (ref 8.3–10.6)
CHLORIDE SERPL-SCNC: 103 MMOL/L (ref 99–110)
CLARITY UR: CLEAR
CO2 SERPL-SCNC: 21 MMOL/L (ref 21–32)
COLOR UR: YELLOW
CREAT SERPL-MCNC: 1 MG/DL (ref 0.9–1.3)
DEPRECATED RDW RBC AUTO: 13.8 % (ref 12.4–15.4)
EOSINOPHIL # BLD: 0.3 K/UL (ref 0–0.6)
EOSINOPHIL NFR BLD: 3.1 %
GFR SERPLBLD CREATININE-BSD FMLA CKD-EPI: >60 ML/MIN/{1.73_M2}
GLUCOSE SERPL-MCNC: 85 MG/DL (ref 70–99)
GLUCOSE UR STRIP.AUTO-MCNC: NEGATIVE MG/DL
HCT VFR BLD AUTO: 51.7 % (ref 40.5–52.5)
HGB BLD-MCNC: 17.6 G/DL (ref 13.5–17.5)
HGB UR QL STRIP.AUTO: NEGATIVE
INR PPP: 0.9 (ref 0.84–1.16)
KETONES UR STRIP.AUTO-MCNC: NEGATIVE MG/DL
LACTATE BLDV-SCNC: 0.8 MMOL/L (ref 0.4–2)
LEUKOCYTE ESTERASE UR QL STRIP.AUTO: NEGATIVE
LIPASE SERPL-CCNC: 79 U/L (ref 13–60)
LYMPHOCYTES # BLD: 2.5 K/UL (ref 1–5.1)
LYMPHOCYTES NFR BLD: 30.3 %
MCH RBC QN AUTO: 31.3 PG (ref 26–34)
MCHC RBC AUTO-ENTMCNC: 34.1 G/DL (ref 31–36)
MCV RBC AUTO: 91.6 FL (ref 80–100)
MONOCYTES # BLD: 0.6 K/UL (ref 0–1.3)
MONOCYTES NFR BLD: 7.2 %
NEUTROPHILS # BLD: 4.8 K/UL (ref 1.7–7.7)
NEUTROPHILS NFR BLD: 58.7 %
NITRITE UR QL STRIP.AUTO: NEGATIVE
PH UR STRIP.AUTO: 6 [PH] (ref 5–8)
PLATELET # BLD AUTO: 193 K/UL (ref 135–450)
PMV BLD AUTO: 10.1 FL (ref 5–10.5)
POTASSIUM SERPL-SCNC: 5.2 MMOL/L (ref 3.5–5.1)
PROT SERPL-MCNC: 7.8 G/DL (ref 6.4–8.2)
PROT UR STRIP.AUTO-MCNC: NEGATIVE MG/DL
PROTHROMBIN TIME: 12.2 SEC (ref 11.5–14.8)
RBC # BLD AUTO: 5.64 M/UL (ref 4.2–5.9)
SODIUM SERPL-SCNC: 138 MMOL/L (ref 136–145)
SP GR UR STRIP.AUTO: >=1.03 (ref 1–1.03)
UA COMPLETE W REFLEX CULTURE PNL UR: ABNORMAL
UA DIPSTICK W REFLEX MICRO PNL UR: ABNORMAL
URN SPEC COLLECT METH UR: ABNORMAL
UROBILINOGEN UR STRIP-ACNC: 0.2 E.U./DL
WBC # BLD AUTO: 8.1 K/UL (ref 4–11)

## 2023-11-29 PROCEDURE — 96374 THER/PROPH/DIAG INJ IV PUSH: CPT

## 2023-11-29 PROCEDURE — 36415 COLL VENOUS BLD VENIPUNCTURE: CPT

## 2023-11-29 PROCEDURE — 85610 PROTHROMBIN TIME: CPT

## 2023-11-29 PROCEDURE — C9113 INJ PANTOPRAZOLE SODIUM, VIA: HCPCS | Performed by: EMERGENCY MEDICINE

## 2023-11-29 PROCEDURE — 81003 URINALYSIS AUTO W/O SCOPE: CPT

## 2023-11-29 PROCEDURE — 96375 TX/PRO/DX INJ NEW DRUG ADDON: CPT

## 2023-11-29 PROCEDURE — 2580000003 HC RX 258: Performed by: EMERGENCY MEDICINE

## 2023-11-29 PROCEDURE — 83605 ASSAY OF LACTIC ACID: CPT

## 2023-11-29 PROCEDURE — 80053 COMPREHEN METABOLIC PANEL: CPT

## 2023-11-29 PROCEDURE — 6360000002 HC RX W HCPCS: Performed by: EMERGENCY MEDICINE

## 2023-11-29 PROCEDURE — 99284 EMERGENCY DEPT VISIT MOD MDM: CPT

## 2023-11-29 PROCEDURE — 96372 THER/PROPH/DIAG INJ SC/IM: CPT

## 2023-11-29 PROCEDURE — 85025 COMPLETE CBC W/AUTO DIFF WBC: CPT

## 2023-11-29 PROCEDURE — 74176 CT ABD & PELVIS W/O CONTRAST: CPT

## 2023-11-29 PROCEDURE — 83690 ASSAY OF LIPASE: CPT

## 2023-11-29 RX ORDER — HALOPERIDOL 5 MG/ML
5 INJECTION INTRAMUSCULAR ONCE
Status: COMPLETED | OUTPATIENT
Start: 2023-11-29 | End: 2023-11-29

## 2023-11-29 RX ORDER — PANTOPRAZOLE SODIUM 40 MG/10ML
40 INJECTION, POWDER, LYOPHILIZED, FOR SOLUTION INTRAVENOUS ONCE
Status: COMPLETED | OUTPATIENT
Start: 2023-11-29 | End: 2023-11-29

## 2023-11-29 RX ORDER — ONDANSETRON 4 MG/1
4 TABLET, FILM COATED ORAL 3 TIMES DAILY PRN
Qty: 15 TABLET | Refills: 0 | Status: SHIPPED | OUTPATIENT
Start: 2023-11-29

## 2023-11-29 RX ORDER — DROPERIDOL 2.5 MG/ML
0.62 INJECTION, SOLUTION INTRAMUSCULAR; INTRAVENOUS EVERY 6 HOURS PRN
Status: DISCONTINUED | OUTPATIENT
Start: 2023-11-29 | End: 2023-11-29 | Stop reason: HOSPADM

## 2023-11-29 RX ORDER — PANTOPRAZOLE SODIUM 20 MG/1
20 TABLET, DELAYED RELEASE ORAL 2 TIMES DAILY
Qty: 30 TABLET | Refills: 3 | Status: SHIPPED | OUTPATIENT
Start: 2023-11-29

## 2023-11-29 RX ORDER — 0.9 % SODIUM CHLORIDE 0.9 %
1000 INTRAVENOUS SOLUTION INTRAVENOUS ONCE
Status: COMPLETED | OUTPATIENT
Start: 2023-11-29 | End: 2023-11-29

## 2023-11-29 RX ADMIN — PANTOPRAZOLE SODIUM 40 MG: 40 INJECTION, POWDER, FOR SOLUTION INTRAVENOUS at 04:49

## 2023-11-29 RX ADMIN — HALOPERIDOL LACTATE 5 MG: 5 INJECTION, SOLUTION INTRAMUSCULAR at 05:45

## 2023-11-29 RX ADMIN — SODIUM CHLORIDE 1000 ML: 9 INJECTION, SOLUTION INTRAVENOUS at 04:45

## 2023-11-29 RX ADMIN — DROPERIDOL 0.62 MG: 2.5 INJECTION, SOLUTION INTRAMUSCULAR; INTRAVENOUS at 04:52

## 2023-11-29 ASSESSMENT — ENCOUNTER SYMPTOMS
ABDOMINAL PAIN: 1
EYES NEGATIVE: 1
BLOOD IN STOOL: 0
ALLERGIC/IMMUNOLOGIC NEGATIVE: 1
RESPIRATORY NEGATIVE: 1
VOMITING: 1
NAUSEA: 1

## 2023-11-29 ASSESSMENT — LIFESTYLE VARIABLES
HOW OFTEN DO YOU HAVE A DRINK CONTAINING ALCOHOL: MONTHLY OR LESS
HOW MANY STANDARD DRINKS CONTAINING ALCOHOL DO YOU HAVE ON A TYPICAL DAY: 3 OR 4

## 2023-11-29 ASSESSMENT — PAIN DESCRIPTION - LOCATION: LOCATION: ABDOMEN

## 2023-11-29 ASSESSMENT — PAIN - FUNCTIONAL ASSESSMENT: PAIN_FUNCTIONAL_ASSESSMENT: NONE - DENIES PAIN

## 2023-11-29 ASSESSMENT — PAIN DESCRIPTION - DESCRIPTORS: DESCRIPTORS: ACHING

## 2023-11-29 ASSESSMENT — PAIN SCALES - GENERAL: PAINLEVEL_OUTOF10: 5

## 2023-11-29 ASSESSMENT — PAIN DESCRIPTION - PAIN TYPE: TYPE: ACUTE PAIN

## 2023-11-29 NOTE — ED NOTES
Report given to [facility], [address] [place] [address], FELISHA  11/29/23 9539

## 2023-11-29 NOTE — ED PROVIDER NOTES
[address] [address] ED  EMERGENCY DEPARTMENT ENCOUNTER      Pt Name: Zackery Campos  MRN: [id number]  [address] [facility] 1990  Date of evaluation: 11/29/2023  Provider: Joaquim Hong MD    CHIEF COMPLAINT       Chief Complaint   Patient presents with    Hematemesis     Patient came in for vomiting  for 2 weeks  and yesterday he started to throw up blood. HISTORY OF PRESENT ILLNESS   (Location/Symptom, Timing/Onset, Context/Setting, Quality, Duration, Modifying Factors, Severity)  Note limiting factors. Zackery Campos is a 26 y.o. male who presents to the emergency department with 2 weeks of nausea, vomiting, abdominal pain and then yesterday started throwing up blood. He describes the blood as bright red and states that at 1 AM it abruptly worsened to approximately a handful of blood. No clots. He uses cannabis daily. He saw his primary care physician earlier in the day who referred him for gastroenterology but he has not followed up yet. They wanted him to take Prilosec, but he is vomiting the Prilosec. No exacerbating or relieving factors. No fevers or cough. No diarrhea. No rectal bleeding. He admits to occasional NSAID use. No chest pain or shortness of breath. Report epigastric pain        Nursing Notes were reviewed. REVIEW OF SYSTEMS    (2-9 systems for level 4, 10 or more for level 5)     Review of Systems   Constitutional: Negative. HENT: Negative. Eyes: Negative. Respiratory: Negative. Cardiovascular: Negative. Gastrointestinal:  Positive for abdominal pain, nausea and vomiting. Negative for blood in stool. Endocrine: Negative. Genitourinary: Negative. Musculoskeletal: Negative. Skin: Negative. Allergic/Immunologic: Negative. Neurological: Negative. Hematological: Negative. Psychiatric/Behavioral: Negative. Except as noted above the remainder of the review of systems was reviewed and negative.        PAST MEDICAL HISTORY

## 2024-02-02 ENCOUNTER — HOSPITAL ENCOUNTER (EMERGENCY)
Age: 34
Discharge: HOME | End: 2024-02-02
Attending: EMERGENCY MEDICINE
Payer: COMMERCIAL

## 2024-02-02 VITALS
WEIGHT: 208 LBS | SYSTOLIC BLOOD PRESSURE: 157 MMHG | HEART RATE: 73 BPM | OXYGEN SATURATION: 100 % | DIASTOLIC BLOOD PRESSURE: 97 MMHG | RESPIRATION RATE: 18 BRPM | BODY MASS INDEX: 28.17 KG/M2 | HEIGHT: 72 IN | TEMPERATURE: 97.3 F

## 2024-02-02 DIAGNOSIS — R50.9 FEVER, UNSPECIFIED FEVER CAUSE: Primary | ICD-10-CM

## 2024-02-02 DIAGNOSIS — B34.9 VIRAL SYNDROME: ICD-10-CM

## 2024-02-02 LAB
FLUAV RNA RESP QL NAA+PROBE: NOT DETECTED
FLUBV RNA RESP QL NAA+PROBE: NOT DETECTED
SARS-COV-2 RNA RESP QL NAA+PROBE: NOT DETECTED

## 2024-02-02 PROCEDURE — 99283 EMERGENCY DEPT VISIT LOW MDM: CPT

## 2024-02-02 PROCEDURE — 87636 SARSCOV2 & INF A&B AMP PRB: CPT

## 2024-02-02 ASSESSMENT — LIFESTYLE VARIABLES
HOW MANY STANDARD DRINKS CONTAINING ALCOHOL DO YOU HAVE ON A TYPICAL DAY: PATIENT DOES NOT DRINK
HOW OFTEN DO YOU HAVE A DRINK CONTAINING ALCOHOL: NEVER

## 2024-02-02 NOTE — ED PROVIDER NOTES
[facility] ED      CHIEF COMPLAINT  Fever (Per patient been running fevers at home 101-103, started 3 days ago, per patient it started 3 days ago. Has an appt on Monday with pcp )       HISTORY OF PRESENT ILLNESS  Noé Wells is a 33 y.o. male  who presents to the ED complaining of concern for fever x 3 days now.  Patient states that he feels chills and bodyaches and overall fatigue.  He has had some vomiting, most recently yesterday but has been able to tolerate p.o. since then and has been drinking Gatorade to stay hydrated.  He has had some slight diarrhea.  No abdominal pain.  No cough or congestion.  No sore throat.  He states that his son attends school so often will come home sick with what ever seems to be going around but otherwise no known other sick contacts.  No recent travel.  He did take a home COVID test yesterday that was negative.    No other complaints, modifying factors or associated symptoms.     I have reviewed the following from the nursing documentation.    Past Medical History:   Diagnosis Date    Anxiety     Bipolar 1 disorder (HCC)     Carpal tunnel syndrome     Depression     Headache     Pneumothorax      Past Surgical History:   Procedure Laterality Date    ARM SURGERY Left     ARM SURGERY Left 2/17/2023    LEFT CUBITIAL TUNNEL RELEASE WITH SCAR BARRIER, LEFT CARPAL TUNNEL REVISION WITH SCAR BARRIER performed by Trevor Porras MD at [facility] OR    CARPAL TUNNEL RELEASE Bilateral     CHEST TUBE INSERTION      OTHER SURGICAL HISTORY Left 02/17/2023    LEFT CUBITIAL TUNNEL RELEASE WITH SCAR BARRIER, LEFT CARPAL TUNNEL REVISION WITH SCAR BARRIER     Family History   Problem Relation Age of Onset    Diabetes Father     Heart Attack Maternal Grandmother     Heart Disease Maternal Grandmother     Prostate Cancer Maternal Grandfather     Cancer Paternal Grandfather     Prostate Cancer Paternal Grandfather      Social History     Socioeconomic History    Marital  %.    DISPOSITION  Noé Wells was discharged to home in stable condition.      Patient was given scripts for the following medications. I counseled patient how to take these medications.   Discharge Medication List as of 2/2/2024 11:12 AM          Follow-up with:  [facility]  [phone]  Schedule an appointment as soon as possible for a visit in 2 days  For recheck, To establish care with a primary care physician    [address] [address]  [phone]    If symptoms worsen      DISCLAIMER: This chart was created using Dragon dictation software.  Efforts were made by me to ensure accuracy, however some errors may be present due to limitations of this technology and occasionally words are not transcribed correctly.        Lorri Sosa MD  02/02/24 9818

## 2024-02-26 ENCOUNTER — HOSPITAL ENCOUNTER (EMERGENCY)
Age: 34
Discharge: HOME | End: 2024-02-26

## 2024-05-30 ENCOUNTER — HOSPITAL ENCOUNTER (EMERGENCY)
Age: 34
Discharge: HOME | End: 2024-05-30
Attending: EMERGENCY MEDICINE
Payer: COMMERCIAL

## 2024-05-30 VITALS
RESPIRATION RATE: 14 BRPM | HEART RATE: 83 BPM | TEMPERATURE: 99 F | HEIGHT: 72 IN | DIASTOLIC BLOOD PRESSURE: 88 MMHG | OXYGEN SATURATION: 100 % | SYSTOLIC BLOOD PRESSURE: 146 MMHG | BODY MASS INDEX: 28.44 KG/M2 | WEIGHT: 210 LBS

## 2024-05-30 DIAGNOSIS — F39 MOOD DISORDER (HCC): Primary | ICD-10-CM

## 2024-05-30 PROCEDURE — 99283 EMERGENCY DEPT VISIT LOW MDM: CPT

## 2024-05-30 PROCEDURE — 6370000000 HC RX 637 (ALT 250 FOR IP): Performed by: EMERGENCY MEDICINE

## 2024-05-30 RX ORDER — LORAZEPAM 1 MG/1
1 TABLET ORAL ONCE
Status: COMPLETED | OUTPATIENT
Start: 2024-05-30 | End: 2024-05-30

## 2024-05-30 RX ORDER — HYDROXYZINE HYDROCHLORIDE 25 MG/1
25 TABLET, FILM COATED ORAL ONCE
Status: COMPLETED | OUTPATIENT
Start: 2024-05-30 | End: 2024-05-30

## 2024-05-30 RX ADMIN — LORAZEPAM 1 MG: 1 TABLET ORAL at 07:42

## 2024-05-30 RX ADMIN — HYDROXYZINE HYDROCHLORIDE 25 MG: 25 TABLET ORAL at 07:42

## 2024-05-30 ASSESSMENT — PAIN - FUNCTIONAL ASSESSMENT: PAIN_FUNCTIONAL_ASSESSMENT: NONE - DENIES PAIN

## 2024-05-30 NOTE — ED PROVIDER NOTES
Systems  10 systems reviewed, pertinent positives per HPI otherwise noted to be negative.      SCREENINGS        [place] Coma Scale  Eye Opening: Spontaneous  Best Verbal Response: Oriented  Best Motor Response: Obeys commands  [place] Coma Scale Score: 15                CIWA Assessment  BP: (!) 155/104  Pulse: 87           PHYSICAL EXAM  1 or more Elements     ED Triage Vitals 05/30/24 0723   BP Temp Temp Source Pulse Respirations SpO2 Height Weight - Scale   (!) 155/104 99 °F (37.2 °C) Oral 87 16 100 % 1.829 m (6') 95.3 kg (210 lb)       GENERAL APPEARANCE: Awake and alert.  HENT: Normocephalic. Atraumatic. EOMI. No facial droop.  HEART/CHEST: RRR.  LUNGS: Respirations unlabored. Speaking comfortably in full sentences.  ABDOMEN: Soft, non-distended abdomen. Non tender to palpation. No guarding. No rebound.  EXTREMITIES: No gross deformities. Moving all extremities.  SKIN: Warm and dry. No acute rashes.  NEUROLOGICAL: Alert and oriented. No gross facial drooping. Answering questions appropriately. Moving all extremities.    DIAGNOSTIC RESULTS   LABS:  Labs Reviewed - No data to display    When ordered only abnormal lab results are displayed. All other labs were within normal range or not returned as of this dictation.    RADIOLOGY:     Non-plain film images such as CT, Ultrasound and MRI are read by the radiologist. Plain radiographic images personally reviewed.     Interpretation per the Radiologist below, if available at the time of this note:  No orders to display     No results found.      Bedside Ultrasound, as interpreted by me, if performed:    No results found.    PROCEDURES     Unless otherwise noted below, none     Procedures    CRITICAL CARE TIME     I personally spent a total of 0 minutes of critical care time in obtaining history, performing a physical exam, bedside monitoring of interventions, collecting and interpreting tests and discussion with consultants but excluding time spent performing

## 2024-05-30 NOTE — DISCHARGE INSTRUCTIONS
Please meet with your psychiatrist today as scheduled whether via telemedicine or in person.  If persistent or worsening symptoms, or if you have any concerns, return to ED immediately.

## 2024-05-30 NOTE — ED NOTES
Discharge instructions reviewed with Mr. Wells. He verbalized understanding. Copy of discharge instructions given.     He was advised not to drive, drink ETOH, operate machinery, or supervise small children after receiving medications here in the ED. He verbalized understanding.     Mr. Wells was discharged to [facility], awaiting  by his mother.  He exited the ED alert, oriented and without difficulty. Aruna Roe RN

## 2024-05-30 NOTE — ED NOTES
Pt alert and verbal drove self to ER this am for feelings of anxiety. Pt denies SI or HI. Pt is calm and cooperative c care and able to answer all triage questions, pt is familiar with all scheduled home medications and is aware of medications he took this am. Pt states he is feeling a bit more anxious today and that he has a [facility] telepsych visit today in regards to adjusting his medications. Pt is requesting a little extra medication this morning for his increased anxiety, pt is on phone with his mother who states she is willing and able to pick him up from our ER and help facilitate psych appointment.

## (undated) DEVICE — Z CONVERTED USE 2273164 BANDAGE COMPR W4INXL4 1/2YD E EC SGL LAYERED CLP CLSR ECONO

## (undated) DEVICE — STERILE LATEX POWDER-FREE SURGICAL GLOVESWITH NITRILE COATING: Brand: PROTEXIS

## (undated) DEVICE — COTTON UNDERCAST PADDING,REGULAR FINISH: Brand: WEBRIL

## (undated) DEVICE — SPLINT ORTH W4XL15IN LAYERED FBRGLS FOAM PD BRTH BK MOLD

## (undated) DEVICE — COTTON UNDERCAST PADDING,CRIMPED FINISH: Brand: WEBRIL

## (undated) DEVICE — Device

## (undated) DEVICE — CURITY ABDOMINAL PAD: Brand: CURITY

## (undated) DEVICE — SUTURE ETHLN SZ 4-0 L18IN NONABSORBABLE BLK L19MM PS-2 3/8 1667H

## (undated) DEVICE — SUTURE VCRL + SZ 3-0 L27IN ABSRB UD L26MM SH 1/2 CIR VCP416H

## (undated) DEVICE — GLOVE SURG SZ 7 L12IN FNGR THK94MIL STD WHT ISOLEX LTX FREE

## (undated) DEVICE — BANDAGE COMPR W2INXL5.5YD BGE POLY COT BLEND YARN HNY STRP

## (undated) DEVICE — ROYAL SILK SURGICAL GOWN, XL: Brand: CONVERTORS

## (undated) DEVICE — DRAPE 33X23IN INCISE ANTIMICROB IOBAN 2

## (undated) DEVICE — SOLUTION,SALINE,IRRGATION,500ML,STRL: Brand: MEDLINE

## (undated) DEVICE — 3M™ COBAN™ NL STERILE NON-LATEX SELF-ADHERENT WRAP, 2084S, 4 IN X 5 YD (10 CM X 4,5 M), 18 ROLLS/CASE: Brand: 3M™ COBAN™

## (undated) DEVICE — 3M™ IOBAN™ 2 ANTIMICROBIAL INCISE DRAPE 6640EZ: Brand: IOBAN™ 2